# Patient Record
Sex: MALE | Race: WHITE | NOT HISPANIC OR LATINO | Employment: OTHER | URBAN - METROPOLITAN AREA
[De-identification: names, ages, dates, MRNs, and addresses within clinical notes are randomized per-mention and may not be internally consistent; named-entity substitution may affect disease eponyms.]

---

## 2017-03-24 ENCOUNTER — OFFICE VISIT (OUTPATIENT)
Dept: CARDIOLOGY | Facility: CLINIC | Age: 67
End: 2017-03-24

## 2017-03-24 VITALS
SYSTOLIC BLOOD PRESSURE: 120 MMHG | DIASTOLIC BLOOD PRESSURE: 64 MMHG | HEIGHT: 68 IN | HEART RATE: 63 BPM | WEIGHT: 160 LBS | BODY MASS INDEX: 24.25 KG/M2

## 2017-03-24 DIAGNOSIS — I48.0 PAROXYSMAL ATRIAL FIBRILLATION (HCC): Primary | ICD-10-CM

## 2017-03-24 PROCEDURE — 93000 ELECTROCARDIOGRAM COMPLETE: CPT | Performed by: NURSE PRACTITIONER

## 2017-03-24 PROCEDURE — 99213 OFFICE O/P EST LOW 20 MIN: CPT | Performed by: NURSE PRACTITIONER

## 2017-03-24 RX ORDER — SILDENAFIL 25 MG/1
25 TABLET, FILM COATED ORAL DAILY PRN
COMMUNITY
Start: 2022-09-08

## 2017-03-24 NOTE — PROGRESS NOTES
Date of Office Visit: 2017  Encounter Provider: KAITY Gonsalez  Place of Service: [facility] CARDIOLOGY  Patient Name: Hieu Mann  :1950    Chief Complaint   Patient presents with   • Atrial Fibrillation     Follow-up   :     HPI: Hieu Mann is a 66 y.o. male comes in today for follow-up for atrial fibrillation.  In 2015, the patient had atrial fibrillation in the setting of a severe infection.  During his hospitalization for infection, he had an echo card gram performed showing an EF of 45%.  This was repeated 6 months later showing an EF of 62% with mild MR and mild TR.  His aortic valve has moderate to severe thickening with mild AR.  No stenosis.    Today, he follows up and has been feeling well in the past year.  He's had no episodes of atrial fibrillation.  He denies palpitations or tachycardia, shortness of breath, edema, lightheadedness, chest pain, fatigue, orthopnea or PND.  He exercises on a regular basis and watches his nutrition.    Past Medical History:   Diagnosis Date   • Atrial fibrillation    • Hyperlipidemia    • [place] spotted fever        Past Surgical History:   Procedure Laterality Date   • REPLACEMENT TOTAL KNEE BILATERAL     • TOTAL HIP ARTHROPLASTY Bilateral            Review of Systems   Constitution: Negative for fever and malaise/fatigue.   HENT: Negative for ear pain, hearing loss, nosebleeds and sore throat.    Eyes: Negative for double vision, pain, vision loss in left eye, vision loss in right eye and visual disturbance.   Cardiovascular: Negative for claudication and leg swelling.   Respiratory: Negative for cough, snoring and wheezing.    Endocrine: Negative for cold intolerance, heat intolerance and polyuria.   Skin: Negative for color change, itching and rash.   Musculoskeletal: Negative for joint pain, joint swelling and muscle cramps.   Gastrointestinal: Negative for abdominal pain, diarrhea,  "melena, nausea and vomiting.   Genitourinary: Negative for bladder incontinence and hematuria.   Neurological: Negative for excessive daytime sleepiness, dizziness, light-headedness, paresthesias and seizures.   Psychiatric/Behavioral: Negative for depression. The patient is not nervous/anxious.    All other systems reviewed and are negative.    All other systems reviewed and are negative    No Known Allergies    All aspects of family and social history reviewed.          Objective:     Vitals:    03/24/17 0902   BP: 120/64   BP Location: Left arm   Pulse: 63   Weight: 160 lb (72.6 kg)   Height: 68\" (172.7 cm)     Body mass index is 24.33 kg/(m^2).    PHYSICAL EXAM:  Physical Exam   Constitutional: He is oriented to person, place, and time. He appears well-developed and well-nourished.   HENT:   Head: Normocephalic and atraumatic.   Neck: Neck supple. No JVD present.   Cardiovascular: Normal rate, regular rhythm and intact distal pulses.    Murmur heard.   Holosystolic murmur is present with a grade of 1/6  at the upper left sternal border  Pulses:       Carotid pulses are 2+ on the right side, and 2+ on the left side.       Radial pulses are 2+ on the right side, and 2+ on the left side.        Dorsalis pedis pulses are 2+ on the right side, and 2+ on the left side.   Pulmonary/Chest: Effort normal and breath sounds normal. No accessory muscle usage. No respiratory distress. He has no rales.   Abdominal: Soft. Normal appearance and bowel sounds are normal. There is no tenderness.   Musculoskeletal: Normal range of motion. He exhibits no edema.   Neurological: He is alert and oriented to person, place, and time.   Skin: Skin is warm, dry and intact. He is not diaphoretic.   Psychiatric: He has a normal mood and affect. His speech is normal and behavior is normal. Judgment and thought content normal. Cognition and memory are normal.         ECG 12 Lead  Date/Time: 3/24/2017 9:28 AM  Performed by: FARIHA " ISRA  Authorized by: ISRA FRIED   Comparison: compared with previous ECG from 2/29/2016  Rhythm: sinus rhythm  Rate: normal  BPM: 63  Conduction: conduction normal  T Waves: T waves normal  QRS axis: normal  Other: no other findings  Clinical impression: normal ECG  Comments: Indication atrial fibrillation                Assessment:       Diagnosis Plan   1. Paroxysmal atrial fibrillation          Orders Placed This Encounter   Procedures   • ECG 12 Lead     This order was created via procedure documentation       Current Outpatient Prescriptions   Medication Sig Dispense Refill   • aspirin 81 MG tablet Take 81 mg by mouth daily.     • atorvastatin (LIPITOR) 20 MG tablet Take 20 mg by mouth daily.     • calcium-vitamin D (OSCAL-500) 500-200 MG-UNIT per tablet Take 1 tablet by mouth daily.     • Multiple Vitamins-Minerals (CENTRUM SILVER ADULT 50+ PO) Take  by mouth.     • sildenafil (VIAGRA) 25 MG tablet Take 25 mg by mouth Daily As Needed for erectile dysfunction.       No current facility-administered medications for this visit.             Plan:       History of paroxysmal atrial fibrillation in the setting of hospitalization for [place] spotted fever.  He's had no return of his atrial fibrillation since his hospitalization.  He has not taking metoprolol.  He does have a history of hyperlipidemia and is managed by his PCP.  No changes at this time.  I recommend follow-up with Dr. Armenta in 1 year.        Follow up in office in one year.     As always, it has been a pleasure to participate in this patient's care.      Sincerely,      KAITY Gonsalez

## 2018-02-15 ENCOUNTER — HOSPITAL ENCOUNTER (EMERGENCY)
Facility: HOSPITAL | Age: 68
Discharge: HOME | End: 2018-02-16
Attending: EMERGENCY MEDICINE | Admitting: EMERGENCY MEDICINE

## 2018-02-15 DIAGNOSIS — K43.9 VENTRAL HERNIA WITHOUT OBSTRUCTION OR GANGRENE: Primary | ICD-10-CM

## 2018-02-15 LAB
ALBUMIN SERPL-MCNC: 4.4 G/DL (ref 3.5–5.2)
ALBUMIN/GLOB SERPL: 1.3 G/DL
ALP SERPL-CCNC: 105 U/L (ref 39–117)
ALT SERPL W P-5'-P-CCNC: 19 U/L (ref 1–41)
ANION GAP SERPL CALCULATED.3IONS-SCNC: 13.1 MMOL/L
AST SERPL-CCNC: 25 U/L (ref 1–40)
BACTERIA UR QL AUTO: ABNORMAL /HPF
BASOPHILS # BLD AUTO: 0.01 10*3/MM3 (ref 0–0.2)
BASOPHILS NFR BLD AUTO: 0.1 % (ref 0–1.5)
BILIRUB SERPL-MCNC: 0.4 MG/DL (ref 0.1–1.2)
BILIRUB UR QL STRIP: NEGATIVE
BUN BLD-MCNC: 13 MG/DL (ref 8–23)
BUN/CREAT SERPL: 16.9 (ref 7–25)
CALCIUM SPEC-SCNC: 9.5 MG/DL (ref 8.6–10.5)
CHLORIDE SERPL-SCNC: 95 MMOL/L (ref 98–107)
CLARITY UR: ABNORMAL
CO2 SERPL-SCNC: 25.9 MMOL/L (ref 22–29)
COLOR UR: YELLOW
CREAT BLD-MCNC: 0.77 MG/DL (ref 0.76–1.27)
DEPRECATED RDW RBC AUTO: 45.1 FL (ref 37–54)
EOSINOPHIL # BLD AUTO: 0.05 10*3/MM3 (ref 0–0.7)
EOSINOPHIL NFR BLD AUTO: 0.4 % (ref 0.3–6.2)
ERYTHROCYTE [DISTWIDTH] IN BLOOD BY AUTOMATED COUNT: 14.5 % (ref 11.5–14.5)
GFR SERPL CREATININE-BSD FRML MDRD: 100 ML/MIN/1.73
GLOBULIN UR ELPH-MCNC: 3.4 GM/DL
GLUCOSE BLD-MCNC: 141 MG/DL (ref 65–99)
GLUCOSE UR STRIP-MCNC: NEGATIVE MG/DL
HCT VFR BLD AUTO: 42.6 % (ref 40.4–52.2)
HGB BLD-MCNC: 14.1 G/DL (ref 13.7–17.6)
HGB UR QL STRIP.AUTO: NEGATIVE
HOLD SPECIMEN: NORMAL
HOLD SPECIMEN: NORMAL
HYALINE CASTS UR QL AUTO: ABNORMAL /LPF
IMM GRANULOCYTES # BLD: 0.02 10*3/MM3 (ref 0–0.03)
IMM GRANULOCYTES NFR BLD: 0.2 % (ref 0–0.5)
KETONES UR QL STRIP: NEGATIVE
LEUKOCYTE ESTERASE UR QL STRIP.AUTO: NEGATIVE
LIPASE SERPL-CCNC: 29 U/L (ref 13–60)
LYMPHOCYTES # BLD AUTO: 0.81 10*3/MM3 (ref 0.9–4.8)
LYMPHOCYTES NFR BLD AUTO: 7.1 % (ref 19.6–45.3)
MCH RBC QN AUTO: 28 PG (ref 27–32.7)
MCHC RBC AUTO-ENTMCNC: 33.1 G/DL (ref 32.6–36.4)
MCV RBC AUTO: 84.5 FL (ref 79.8–96.2)
MONOCYTES # BLD AUTO: 0.54 10*3/MM3 (ref 0.2–1.2)
MONOCYTES NFR BLD AUTO: 4.7 % (ref 5–12)
NEUTROPHILS # BLD AUTO: 10.02 10*3/MM3 (ref 1.9–8.1)
NEUTROPHILS NFR BLD AUTO: 87.5 % (ref 42.7–76)
NITRITE UR QL STRIP: NEGATIVE
PH UR STRIP.AUTO: 8 [PH] (ref 5–8)
PLATELET # BLD AUTO: 204 10*3/MM3 (ref 140–500)
PMV BLD AUTO: 11.2 FL (ref 6–12)
POTASSIUM BLD-SCNC: 4 MMOL/L (ref 3.5–5.2)
PROT SERPL-MCNC: 7.8 G/DL (ref 6–8.5)
PROT UR QL STRIP: ABNORMAL
RBC # BLD AUTO: 5.04 10*6/MM3 (ref 4.6–6)
RBC # UR: ABNORMAL /HPF
REF LAB TEST METHOD: ABNORMAL
SODIUM BLD-SCNC: 134 MMOL/L (ref 136–145)
SP GR UR STRIP: 1.02 (ref 1–1.03)
SQUAMOUS #/AREA URNS HPF: ABNORMAL /HPF
UROBILINOGEN UR QL STRIP: ABNORMAL
WBC NRBC COR # BLD: 11.45 10*3/MM3 (ref 4.5–10.7)
WBC UR QL AUTO: ABNORMAL /HPF
WHOLE BLOOD HOLD SPECIMEN: NORMAL
WHOLE BLOOD HOLD SPECIMEN: NORMAL

## 2018-02-15 PROCEDURE — 36415 COLL VENOUS BLD VENIPUNCTURE: CPT | Performed by: EMERGENCY MEDICINE

## 2018-02-15 PROCEDURE — 85025 COMPLETE CBC W/AUTO DIFF WBC: CPT | Performed by: EMERGENCY MEDICINE

## 2018-02-15 PROCEDURE — 81001 URINALYSIS AUTO W/SCOPE: CPT | Performed by: EMERGENCY MEDICINE

## 2018-02-15 PROCEDURE — 83690 ASSAY OF LIPASE: CPT | Performed by: EMERGENCY MEDICINE

## 2018-02-15 PROCEDURE — 80053 COMPREHEN METABOLIC PANEL: CPT | Performed by: EMERGENCY MEDICINE

## 2018-02-15 PROCEDURE — 99284 EMERGENCY DEPT VISIT MOD MDM: CPT

## 2018-02-15 RX ORDER — SODIUM CHLORIDE 0.9 % (FLUSH) 0.9 %
10 SYRINGE (ML) INJECTION AS NEEDED
Status: DISCONTINUED | OUTPATIENT
Start: 2018-02-15 | End: 2018-02-16 | Stop reason: HOSPADM

## 2018-02-16 VITALS
OXYGEN SATURATION: 95 % | BODY MASS INDEX: 25.01 KG/M2 | HEART RATE: 71 BPM | RESPIRATION RATE: 15 BRPM | SYSTOLIC BLOOD PRESSURE: 136 MMHG | DIASTOLIC BLOOD PRESSURE: 71 MMHG | WEIGHT: 165 LBS | HEIGHT: 68 IN | TEMPERATURE: 99.4 F

## 2018-02-16 NOTE — DISCHARGE INSTRUCTIONS
Return to the ED if you develop any intractable pain, vomiting or redness around your abdomen. Do not lift anything greater than 10-15 pounds.

## 2018-02-16 NOTE — ED NOTES
Pt resting comfortably on stretcher, reports that around 5pm tonight started having severe periumbilical pain rated 10/10 sudden onset, pain lasting approx 4 hours. Pain has now resolved. Pt's abd soft and nontender except around umbilicus, where patient states he has a hernia.     Kathryn Ponce RN  02/15/18 1460

## 2018-02-16 NOTE — ED PROVIDER NOTES
EMERGENCY DEPARTMENT ENCOUNTER    CHIEF COMPLAINT  Chief Complaint: abd pain  History given by: patient, son  History limited by: nothing  Room Number: 39/39  PMD: Clara Ann Pallares, MD    HPI:  Pt is a 67 y.o. male who presents complaining of intermittent, sharp, severe periumbilical abd pain that began around 1700 today. Pt states that the pain resolved after 4-5 hours. Pt states that he has a known umbilical hernia and that his hernia was bulging and hard to the touch while he was in pain. Pt states that belching and passing gas improved the pain but sitting down made his pain worse. Pt also complains of nausea while he had the pain but denies fever, chills, diarrhea, vomiting. Pt denies having similar symptoms previously.    Duration:  4-5 hours  Onset: gradual  Timing: constant  Location: periumbilical  Radiation: none  Quality: sharp  Intensity/Severity: severe  Progression: resolved  Associated Symptoms: nausea  Aggravating Factors: sitting down  Alleviating Factors: belching, passing gas  Previous Episodes: Pt denies having similar symptoms previously.  Treatment before arrival: none    PAST MEDICAL HISTORY  Active Ambulatory Problems     Diagnosis Date Noted   • No Active Ambulatory Problems     Resolved Ambulatory Problems     Diagnosis Date Noted   • Atrial fibrillation      Past Medical History:   Diagnosis Date   • Atrial fibrillation    • Hyperlipidemia    • [place] spotted fever 2015       PAST SURGICAL HISTORY  Past Surgical History:   Procedure Laterality Date   • INGUINAL HERNIA REPAIR     • REPLACEMENT TOTAL KNEE BILATERAL     • TOTAL HIP ARTHROPLASTY Bilateral        FAMILY HISTORY  History reviewed. No pertinent family history.    SOCIAL HISTORY  Social History     Social History   • Marital status:      Spouse name: N/A   • Number of children: N/A   • Years of education: N/A     Occupational History   • Not on file.     Social History Main Topics   • Smoking status: Never Smoker    • Smokeless tobacco: Not on file   • Alcohol use No   • Drug use: No   • Sexual activity: Not on file     Other Topics Concern   • Not on file     Social History Narrative   • No narrative on file       ALLERGIES  Review of patient's allergies indicates no known allergies.    REVIEW OF SYSTEMS  Review of Systems   Constitutional: Negative for activity change, appetite change and fever.   HENT: Negative for congestion and sore throat.    Eyes: Negative.    Respiratory: Negative for cough and shortness of breath.    Cardiovascular: Negative for chest pain and leg swelling.   Gastrointestinal: Positive for abdominal pain (periumbilical) and nausea. Negative for diarrhea and vomiting.   Endocrine: Negative.    Genitourinary: Negative for decreased urine volume and dysuria.   Musculoskeletal: Negative for neck pain.   Skin: Negative for rash and wound.   Allergic/Immunologic: Negative.    Neurological: Negative for weakness, numbness and headaches.   Hematological: Negative.    Psychiatric/Behavioral: Negative.    All other systems reviewed and are negative.      PHYSICAL EXAM  ED Triage Vitals   Temp Heart Rate Resp BP SpO2   02/15/18 2100 02/15/18 2100 02/15/18 2126 02/15/18 2126 02/15/18 2100   99.4 °F (37.4 °C) 80 15 127/81 97 %      Temp src Heart Rate Source Patient Position BP Location FiO2 (%)   02/15/18 2100 02/15/18 2100 -- -- --   Tympanic Monitor          Physical Exam   Constitutional: He is oriented to person, place, and time. He appears distressed (mild).   HENT:   Head: Normocephalic and atraumatic.   Eyes: EOM are normal. Pupils are equal, round, and reactive to light.   Neck: Normal range of motion. Neck supple.   Cardiovascular: Normal rate and regular rhythm.    Murmur heard.   Systolic (ejection murmur best heard at the left sternal border) murmur is present with a grade of 3/6   Pulmonary/Chest: Effort normal and breath sounds normal. No respiratory distress.   Abdominal: Soft. Bowel sounds are  normal. There is tenderness (superior to umbilicus). There is no rebound and no guarding. A hernia (ventral, reducible) is present.   Musculoskeletal: Normal range of motion. He exhibits no edema.   Neurological: He is alert and oriented to person, place, and time. He has normal sensation and normal strength.   Skin: Skin is warm and dry.   Psychiatric: Mood and affect normal.   Nursing note and vitals reviewed.      LAB RESULTS  Lab Results (last 24 hours)     Procedure Component Value Units Date/Time    CBC & Differential [[id number]] Collected:  02/15/18 2134    Specimen:  Blood Updated:  02/15/18 2143    Narrative:       The following orders were created for panel order CBC & Differential.  Procedure                               Abnormality         Status                     ---------                               -----------         ------                     CBC Auto Differential[[id number]]        Abnormal            Final result                 Please view results for these tests on the individual orders.    Comprehensive Metabolic Panel [[id number]]  (Abnormal) Collected:  02/15/18 2134    Specimen:  Blood Updated:  02/15/18 2204     Glucose 141 (H) mg/dL      BUN 13 mg/dL      Creatinine 0.77 mg/dL      Sodium 134 (L) mmol/L      Potassium 4.0 mmol/L      Chloride 95 (L) mmol/L      CO2 25.9 mmol/L      Calcium 9.5 mg/dL      Total Protein 7.8 g/dL      Albumin 4.40 g/dL      ALT (SGPT) 19 U/L      AST (SGOT) 25 U/L      Alkaline Phosphatase 105 U/L      Total Bilirubin 0.4 mg/dL      eGFR Non African Amer 100 mL/min/1.73      Globulin 3.4 gm/dL      A/G Ratio 1.3 g/dL      BUN/Creatinine Ratio 16.9     Anion Gap 13.1 mmol/L     Lipase [[id number]]  (Normal) Collected:  02/15/18 2134    Specimen:  Blood Updated:  02/15/18 2204     Lipase 29 U/L     CBC Auto Differential [[id number]]  (Abnormal) Collected:  02/15/18 2134    Specimen:  Blood Updated:  02/15/18 2143     WBC 11.45 (H) 10*3/mm3      RBC 5.04  10*6/mm3      Hemoglobin 14.1 g/dL      Hematocrit 42.6 %      MCV 84.5 fL      MCH 28.0 pg      MCHC 33.1 g/dL      RDW 14.5 %      RDW-SD 45.1 fl      MPV 11.2 fL      Platelets 204 10*3/mm3      Neutrophil % 87.5 (H) %      Lymphocyte % 7.1 (L) %      Monocyte % 4.7 (L) %      Eosinophil % 0.4 %      Basophil % 0.1 %      Immature Grans % 0.2 %      Neutrophils, Absolute 10.02 (H) 10*3/mm3      Lymphocytes, Absolute 0.81 (L) 10*3/mm3      Monocytes, Absolute 0.54 10*3/mm3      Eosinophils, Absolute 0.05 10*3/mm3      Basophils, Absolute 0.01 10*3/mm3      Immature Grans, Absolute 0.02 10*3/mm3     Urinalysis With / Culture If Indicated - Urine, Clean Catch [[id number]]  (Abnormal) Collected:  02/15/18 2135    Specimen:  Urine from Urine, Clean Catch Updated:  02/15/18 2151     Color, UA Yellow     Appearance, UA Cloudy (A)     pH, UA 8.0     Specific Gravity, UA 1.021     Glucose, UA Negative     Ketones, UA Negative     Bilirubin, UA Negative     Blood, UA Negative     Protein, UA 30 mg/dL (1+) (A)     Leuk Esterase, UA Negative     Nitrite, UA Negative     Urobilinogen, UA 1.0 E.U./dL    Urinalysis, Microscopic Only - Urine, Clean Catch [[id number]]  (Abnormal) Collected:  02/15/18 2135    Specimen:  Urine from Urine, Clean Catch Updated:  02/15/18 2151     RBC, UA 3-5 (A) /HPF      WBC, UA 0-2 /HPF      Bacteria, UA None Seen /HPF      Squamous Epithelial Cells, UA 0-2 /HPF      Hyaline Casts, UA 0-2 /LPF      Methodology Automated Microscopy          I ordered the above labs and reviewed the results    PROCEDURES  Procedures      PROGRESS AND CONSULTS  ED Course     2319- Notified pt and family of the pt's lab results, including his WBC count. D/w pt and family that the pt's pain is likely due to his known hernia. D/w pt and family that since his pain is resolved and his hernia is reducible, there is no indication for a CT Abd/Pelvis at this time. Discussed the plan to discharge the pt home with instructions to  follow up with general surgery. Pt and family request that I refer them to Dr. Finn (general surgery). Pt understands and agrees with the plan, all questions answered.    MEDICAL DECISION MAKING  Results were reviewed/discussed with the patient and they were also made aware of online access. Pt also made aware that some labs, such as cultures, will not be resulted during ER visit and follow up with PMD is necessary.     MDM  Number of Diagnoses or Management Options  Ventral hernia without obstruction or gangrene:      Amount and/or Complexity of Data Reviewed  Clinical lab tests: ordered and reviewed (WBC=11.45)  Obtain history from someone other than the patient: yes (son)    Patient Progress  Patient progress: stable         DIAGNOSIS  Final diagnoses:   Ventral hernia without obstruction or gangrene       DISPOSITION  DISCHARGE    Patient discharged in stable condition.    Reviewed implications of results, diagnosis, meds, responsibility to follow up, warning signs and symptoms of possible worsening, potential complications and reasons to return to ER, including fever, worsening pain or any concerns.    Patient/Family voiced understanding of above instructions.    Discussed plan for discharge, as there is no emergent indication for admission.  Pt/family is agreeable and understands need for follow up and repeat testing.  Pt is aware that discharge does not mean that nothing is wrong but it indicates no emergency is present that requires admission and they must continue care with follow-up as given below or physician of their choice.     FOLLOW-UP  David Finn MD  [facility] [address] [address] [phone]    Schedule an appointment as soon as possible for a visit           Medication List      Notice     No changes were made to your prescriptions during this visit.            Latest Documented Vital Signs:  As of 2:38 AM  BP- 136/71 HR- 71 Temp- 99.4 °F (37.4 °C) (Tympanic) O2 sat-  95%    --  Documentation assistance provided by dang Batista for Dr. Wilkes.  Information recorded by the dang was done at my direction and has been verified and validated by me.     Kathy Batista  02/15/18 0499       Dariel Wilkes MD  02/16/18 2672

## 2018-03-01 ENCOUNTER — OFFICE VISIT (OUTPATIENT)
Dept: SURGERY | Facility: CLINIC | Age: 68
End: 2018-03-01

## 2018-03-01 VITALS — HEIGHT: 68 IN | HEART RATE: 78 BPM | BODY MASS INDEX: 25.46 KG/M2 | OXYGEN SATURATION: 99 % | WEIGHT: 168 LBS

## 2018-03-01 DIAGNOSIS — K43.9 HERNIA, EPIGASTRIC: Primary | ICD-10-CM

## 2018-03-01 PROCEDURE — 99203 OFFICE O/P NEW LOW 30 MIN: CPT | Performed by: SURGERY

## 2018-03-01 RX ORDER — CEFAZOLIN SODIUM 2 G/100ML
2 INJECTION, SOLUTION INTRAVENOUS ONCE
Status: CANCELLED | OUTPATIENT
Start: 2018-03-09 | End: 2018-03-09

## 2018-03-01 NOTE — PROGRESS NOTES
SUMMARY (A/P):    67-year-old gentleman with symptomatic epigastric hernia, wishes to proceed with repair.  I described to him the nature of the procedure, the rationale for the procedure, and the risks including but not limited to bleeding, infection, use of mesh, and recurrence.      CC:  Hernia    HPI:  67-year-old gentleman who has had what he believes to be a hernia in the supraumbilical region for approximately 2 years.  He had an episode of severe pain associated with increased size of the bulge approximately 2 weeks ago leading to an emergency room visit.    PHYSICAL EXAM:   Constitutional: Well-developed well-nourished, no acute distress  Vital signs: Heart rate 78, weight 160 pounds, height 68 inches, BMI 25.6  Eyes: Conjunctiva normal, sclera nonicteric  ENMT: Hearing grossly normal, oral mucosa moist  Neck: Supple, no palpable mass, normal thyroid, trachea midline  Respiratory: Clear to auscultation, normal inspiratory effort  Cardiovascular: Regular rate, no murmur, no carotid bruit, no peripheral edema, no jugular venous distention  Gastrointestinal: Soft, nontender, no palpable mass, no hepatosplenomegaly, 3 cm incarcerated epigastric hernia immediately superior to the umbilicus, minimally tender, bowel sounds normal  Lymphatics (palpable nodes):  cervical-negative, axillary-negative  Skin:  Warm, dry, no rash on visualized skin surfaces  Musculoskeletal: Symmetric strength, normal gait  Psychiatric: Alert and oriented ×3, normal affect     ALLERGIES: reviewed, in Epic    MEDICATIONS: reviewed, in Epic    PMH:    Atrial fibrillation (resolved, sees Dr. Montenegro up periodically)  Hyperlipidemia  [place] spotted fever 2015    PSH:    Right total hip arthroplasty 2009  Left total knee arthroplasty 2012  Left total hip arthroplasty 2011  Right total knee arthroplasty 2003    FAMILY HISTORY:    Negative for colorectal cancer    SOCIAL HISTORY:   Denies tobacco use  Denies alcohol use    ROS:  No  chest pain or shortness of air.  Negative for constipation or dysuria.  All other systems reviewed and negative other than presenting complaints.    ANA SMITH M.D.

## 2018-03-06 ENCOUNTER — APPOINTMENT (OUTPATIENT)
Dept: PREADMISSION TESTING | Facility: HOSPITAL | Age: 68
End: 2018-03-06

## 2018-03-06 VITALS
HEART RATE: 70 BPM | RESPIRATION RATE: 16 BRPM | DIASTOLIC BLOOD PRESSURE: 63 MMHG | WEIGHT: 173.44 LBS | TEMPERATURE: 98.3 F | HEIGHT: 68 IN | SYSTOLIC BLOOD PRESSURE: 117 MMHG | OXYGEN SATURATION: 98 % | BODY MASS INDEX: 26.29 KG/M2

## 2018-03-06 LAB
ANION GAP SERPL CALCULATED.3IONS-SCNC: 11.6 MMOL/L
BUN BLD-MCNC: 18 MG/DL (ref 8–23)
BUN/CREAT SERPL: 26.9 (ref 7–25)
CALCIUM SPEC-SCNC: 9.1 MG/DL (ref 8.6–10.5)
CHLORIDE SERPL-SCNC: 105 MMOL/L (ref 98–107)
CO2 SERPL-SCNC: 23.4 MMOL/L (ref 22–29)
CREAT BLD-MCNC: 0.67 MG/DL (ref 0.76–1.27)
DEPRECATED RDW RBC AUTO: 46.1 FL (ref 37–54)
ERYTHROCYTE [DISTWIDTH] IN BLOOD BY AUTOMATED COUNT: 14.6 % (ref 11.5–14.5)
GFR SERPL CREATININE-BSD FRML MDRD: 118 ML/MIN/1.73
GLUCOSE BLD-MCNC: 159 MG/DL (ref 65–99)
HCT VFR BLD AUTO: 41.6 % (ref 40.4–52.2)
HGB BLD-MCNC: 13 G/DL (ref 13.7–17.6)
MCH RBC QN AUTO: 27 PG (ref 27–32.7)
MCHC RBC AUTO-ENTMCNC: 31.3 G/DL (ref 32.6–36.4)
MCV RBC AUTO: 86.5 FL (ref 79.8–96.2)
PLATELET # BLD AUTO: 215 10*3/MM3 (ref 140–500)
PMV BLD AUTO: 10.6 FL (ref 6–12)
POTASSIUM BLD-SCNC: 4.1 MMOL/L (ref 3.5–5.2)
RBC # BLD AUTO: 4.81 10*6/MM3 (ref 4.6–6)
SODIUM BLD-SCNC: 140 MMOL/L (ref 136–145)
WBC NRBC COR # BLD: 5.85 10*3/MM3 (ref 4.5–10.7)

## 2018-03-06 PROCEDURE — 85027 COMPLETE CBC AUTOMATED: CPT | Performed by: SURGERY

## 2018-03-06 PROCEDURE — 80048 BASIC METABOLIC PNL TOTAL CA: CPT | Performed by: SURGERY

## 2018-03-06 PROCEDURE — 93005 ELECTROCARDIOGRAM TRACING: CPT

## 2018-03-06 PROCEDURE — 36415 COLL VENOUS BLD VENIPUNCTURE: CPT

## 2018-03-06 PROCEDURE — 93010 ELECTROCARDIOGRAM REPORT: CPT | Performed by: INTERNAL MEDICINE

## 2018-03-06 NOTE — DISCHARGE INSTRUCTIONS
Take the following medications the morning of surgery with a small sip of water:    NONE    General Instructions:  • Do not eat solid food after midnight the night before surgery.  • You may drink clear liquids day of surgery but must stop at least one hour before your hospital arrival time.  ( 0700 AM )  • It is beneficial for you to have a clear drink that contains carbohydrates the day of surgery.  We suggest a 12 to 20 ounce bottle of Gatorade or Powerade for non-diabetic patients     Clear liquids are liquids you can see through.  Nothing red in color.     Plain water                               Sports drinks  Sodas                                   Gelatin (Jell-O)  Fruit juices without pulp such as white grape juice and apple juice  Popsicles that contain no fruit or yogurt  Tea or coffee (no cream or milk added)  Gatorade / Powerade  G2 / Powerade Zero     • Bring any papers given to you in the doctor’s office.  • Wear clean comfortable clothes and socks.  • Do not wear contact lenses or make-up.  Bring a case for your glasses.   • Remove all piercings.  Leave jewelry and any other valuables at home.  • The Pre-Admission Testing nurse will instruct you to bring medications if unable to obtain an accurate list in Pre-Admission Testing.  • REPORT TO OUTPATIENT SURGERY ON 3-9-2018 AT 0800 AM            Preventing a Surgical Site Infection:  • For 2 to 3 days before surgery, avoid shaving with a razor because the razor can irritate skin and make it easier to develop an infection.  • The night prior to surgery sleep in a clean bed with clean clothing.  Do not allow pets to sleep with you.  • Shower on the morning of surgery using a fresh bar of anti-bacterial soap (such as Dial) and clean washcloth.  Dry with a clean towel and dress in clean clothing.  • Ask your surgeon if you will be receiving antibiotics prior to surgery.  • Make sure you, your family, and all healthcare providers clean their hands with soap  and water or an alcohol based hand  before caring for you or your wound.    Day of surgery:  Upon arrival, a Pre-op nurse and Anesthesiologist will review your health history, obtain vital signs, and answer questions you may have.  The only belongings needed at this time will be your home medications and if applicable your C-PAP/BI-PAP machine.  If you are staying overnight your family can leave the rest of your belongings in the car and bring them to your room later.  A Pre-op nurse will start an IV and you may receive medication in preparation for surgery, including something to help you relax.  Your family will be able to see you in the Pre-op area.  While you are in surgery your family should notify the waiting room  if they leave the waiting room area and provide a contact phone number.    Please be aware that surgery does come with discomfort.  We want to make every effort to control your discomfort so please discuss any uncontrolled symptoms with your nurse.   Your doctor will most likely have prescribed pain medications.      If you are going home after surgery you will receive individualized written care instructions before being discharged.  A responsible adult must drive you to and from the hospital on the day of your surgery and stay with you for 24 hours.    If you have any questions please call Pre-Admission Testing at [phone].  Deductibles and co-payments are collected on the day of service. Please be prepared to pay the required co-pay, deductible or deposit on the day of service as defined by your plan.

## 2018-03-09 ENCOUNTER — HOSPITAL ENCOUNTER (OUTPATIENT)
Facility: HOSPITAL | Age: 68
Setting detail: HOSPITAL OUTPATIENT SURGERY
Discharge: HOME | End: 2018-03-09
Attending: SURGERY | Admitting: SURGERY

## 2018-03-09 ENCOUNTER — ANESTHESIA (OUTPATIENT)
Dept: PERIOP | Facility: HOSPITAL | Age: 68
End: 2018-03-09

## 2018-03-09 ENCOUNTER — ANESTHESIA EVENT (OUTPATIENT)
Dept: PERIOP | Facility: HOSPITAL | Age: 68
End: 2018-03-09

## 2018-03-09 VITALS
RESPIRATION RATE: 18 BRPM | HEART RATE: 60 BPM | SYSTOLIC BLOOD PRESSURE: 110 MMHG | OXYGEN SATURATION: 100 % | DIASTOLIC BLOOD PRESSURE: 65 MMHG | TEMPERATURE: 97.7 F

## 2018-03-09 DIAGNOSIS — K43.9 HERNIA, EPIGASTRIC: ICD-10-CM

## 2018-03-09 PROCEDURE — 25010000002 MIDAZOLAM PER 1 MG: Performed by: NURSE ANESTHETIST, CERTIFIED REGISTERED

## 2018-03-09 PROCEDURE — 88302 TISSUE EXAM BY PATHOLOGIST: CPT | Performed by: SURGERY

## 2018-03-09 PROCEDURE — 49572 PR REPAIR EPIGASTRIC HERNIA,STRANG: CPT | Performed by: PHYSICIAN ASSISTANT

## 2018-03-09 PROCEDURE — 25010000002 ONDANSETRON PER 1 MG: Performed by: NURSE ANESTHETIST, CERTIFIED REGISTERED

## 2018-03-09 PROCEDURE — 25010000002 MIDAZOLAM PER 1 MG: Performed by: ANESTHESIOLOGY

## 2018-03-09 PROCEDURE — 25010000002 FENTANYL CITRATE (PF) 100 MCG/2ML SOLUTION: Performed by: NURSE ANESTHETIST, CERTIFIED REGISTERED

## 2018-03-09 PROCEDURE — 49572 PR REPAIR EPIGASTRIC HERNIA,STRANG: CPT | Performed by: SURGERY

## 2018-03-09 PROCEDURE — 25010000003 CEFAZOLIN IN DEXTROSE 2-4 GM/100ML-% SOLUTION: Performed by: SURGERY

## 2018-03-09 PROCEDURE — 25010000002 PROPOFOL 10 MG/ML EMULSION: Performed by: NURSE ANESTHETIST, CERTIFIED REGISTERED

## 2018-03-09 RX ORDER — MIDAZOLAM HYDROCHLORIDE 1 MG/ML
2 INJECTION INTRAMUSCULAR; INTRAVENOUS
Status: DISCONTINUED | OUTPATIENT
Start: 2018-03-09 | End: 2018-03-09 | Stop reason: HOSPADM

## 2018-03-09 RX ORDER — ONDANSETRON 2 MG/ML
4 INJECTION INTRAMUSCULAR; INTRAVENOUS ONCE AS NEEDED
Status: DISCONTINUED | OUTPATIENT
Start: 2018-03-09 | End: 2018-03-09 | Stop reason: HOSPADM

## 2018-03-09 RX ORDER — EPHEDRINE SULFATE 50 MG/ML
5 INJECTION, SOLUTION INTRAVENOUS ONCE AS NEEDED
Status: DISCONTINUED | OUTPATIENT
Start: 2018-03-09 | End: 2018-03-09 | Stop reason: HOSPADM

## 2018-03-09 RX ORDER — SODIUM CHLORIDE, SODIUM LACTATE, POTASSIUM CHLORIDE, CALCIUM CHLORIDE 600; 310; 30; 20 MG/100ML; MG/100ML; MG/100ML; MG/100ML
9 INJECTION, SOLUTION INTRAVENOUS CONTINUOUS
Status: DISCONTINUED | OUTPATIENT
Start: 2018-03-09 | End: 2018-03-09 | Stop reason: HOSPADM

## 2018-03-09 RX ORDER — PROMETHAZINE HYDROCHLORIDE 25 MG/1
12.5 TABLET ORAL ONCE AS NEEDED
Status: DISCONTINUED | OUTPATIENT
Start: 2018-03-09 | End: 2018-03-09 | Stop reason: HOSPADM

## 2018-03-09 RX ORDER — CEFAZOLIN SODIUM 2 G/100ML
2 INJECTION, SOLUTION INTRAVENOUS ONCE
Status: COMPLETED | OUTPATIENT
Start: 2018-03-09 | End: 2018-03-09

## 2018-03-09 RX ORDER — FENTANYL CITRATE 50 UG/ML
50 INJECTION, SOLUTION INTRAMUSCULAR; INTRAVENOUS
Status: DISCONTINUED | OUTPATIENT
Start: 2018-03-09 | End: 2018-03-09 | Stop reason: HOSPADM

## 2018-03-09 RX ORDER — SODIUM CHLORIDE 0.9 % (FLUSH) 0.9 %
1-10 SYRINGE (ML) INJECTION AS NEEDED
Status: DISCONTINUED | OUTPATIENT
Start: 2018-03-09 | End: 2018-03-09 | Stop reason: HOSPADM

## 2018-03-09 RX ORDER — LIDOCAINE HYDROCHLORIDE 10 MG/ML
0.5 INJECTION, SOLUTION EPIDURAL; INFILTRATION; INTRACAUDAL; PERINEURAL ONCE AS NEEDED
Status: DISCONTINUED | OUTPATIENT
Start: 2018-03-09 | End: 2018-03-09 | Stop reason: HOSPADM

## 2018-03-09 RX ORDER — MIDAZOLAM HYDROCHLORIDE 1 MG/ML
INJECTION INTRAMUSCULAR; INTRAVENOUS AS NEEDED
Status: DISCONTINUED | OUTPATIENT
Start: 2018-03-09 | End: 2018-03-09 | Stop reason: SURG

## 2018-03-09 RX ORDER — ONDANSETRON 2 MG/ML
INJECTION INTRAMUSCULAR; INTRAVENOUS AS NEEDED
Status: DISCONTINUED | OUTPATIENT
Start: 2018-03-09 | End: 2018-03-09 | Stop reason: SURG

## 2018-03-09 RX ORDER — ONDANSETRON 4 MG/1
4 TABLET, FILM COATED ORAL EVERY 6 HOURS PRN
Qty: 10 TABLET | Refills: 1 | Status: SHIPPED | OUTPATIENT
Start: 2018-03-09 | End: 2018-03-15

## 2018-03-09 RX ORDER — NALOXONE HCL 0.4 MG/ML
0.2 VIAL (ML) INJECTION AS NEEDED
Status: DISCONTINUED | OUTPATIENT
Start: 2018-03-09 | End: 2018-03-09 | Stop reason: HOSPADM

## 2018-03-09 RX ORDER — MAGNESIUM HYDROXIDE 1200 MG/15ML
LIQUID ORAL AS NEEDED
Status: DISCONTINUED | OUTPATIENT
Start: 2018-03-09 | End: 2018-03-09 | Stop reason: HOSPADM

## 2018-03-09 RX ORDER — PROMETHAZINE HYDROCHLORIDE 25 MG/1
25 SUPPOSITORY RECTAL ONCE AS NEEDED
Status: DISCONTINUED | OUTPATIENT
Start: 2018-03-09 | End: 2018-03-09 | Stop reason: HOSPADM

## 2018-03-09 RX ORDER — FAMOTIDINE 10 MG/ML
20 INJECTION, SOLUTION INTRAVENOUS ONCE
Status: COMPLETED | OUTPATIENT
Start: 2018-03-09 | End: 2018-03-09

## 2018-03-09 RX ORDER — BUPIVACAINE HYDROCHLORIDE AND EPINEPHRINE 5; 5 MG/ML; UG/ML
INJECTION, SOLUTION PERINEURAL AS NEEDED
Status: DISCONTINUED | OUTPATIENT
Start: 2018-03-09 | End: 2018-03-09 | Stop reason: HOSPADM

## 2018-03-09 RX ORDER — PROMETHAZINE HYDROCHLORIDE 25 MG/ML
12.5 INJECTION, SOLUTION INTRAMUSCULAR; INTRAVENOUS ONCE AS NEEDED
Status: DISCONTINUED | OUTPATIENT
Start: 2018-03-09 | End: 2018-03-09 | Stop reason: HOSPADM

## 2018-03-09 RX ORDER — FENTANYL CITRATE 50 UG/ML
INJECTION, SOLUTION INTRAMUSCULAR; INTRAVENOUS AS NEEDED
Status: DISCONTINUED | OUTPATIENT
Start: 2018-03-09 | End: 2018-03-09 | Stop reason: SURG

## 2018-03-09 RX ORDER — PROPOFOL 10 MG/ML
VIAL (ML) INTRAVENOUS CONTINUOUS PRN
Status: DISCONTINUED | OUTPATIENT
Start: 2018-03-09 | End: 2018-03-09 | Stop reason: SURG

## 2018-03-09 RX ORDER — FLUMAZENIL 0.1 MG/ML
0.2 INJECTION INTRAVENOUS AS NEEDED
Status: DISCONTINUED | OUTPATIENT
Start: 2018-03-09 | End: 2018-03-09 | Stop reason: HOSPADM

## 2018-03-09 RX ORDER — LABETALOL HYDROCHLORIDE 5 MG/ML
5 INJECTION, SOLUTION INTRAVENOUS
Status: DISCONTINUED | OUTPATIENT
Start: 2018-03-09 | End: 2018-03-09 | Stop reason: HOSPADM

## 2018-03-09 RX ORDER — HYDROCODONE BITARTRATE AND ACETAMINOPHEN 5; 325 MG/1; MG/1
TABLET ORAL
Qty: 30 TABLET | Refills: 0 | Status: SHIPPED | OUTPATIENT
Start: 2018-03-09 | End: 2018-03-15

## 2018-03-09 RX ORDER — HYDROCODONE BITARTRATE AND ACETAMINOPHEN 7.5; 325 MG/1; MG/1
1 TABLET ORAL ONCE AS NEEDED
Status: DISCONTINUED | OUTPATIENT
Start: 2018-03-09 | End: 2018-03-09 | Stop reason: HOSPADM

## 2018-03-09 RX ORDER — MIDAZOLAM HYDROCHLORIDE 1 MG/ML
1 INJECTION INTRAMUSCULAR; INTRAVENOUS
Status: DISCONTINUED | OUTPATIENT
Start: 2018-03-09 | End: 2018-03-09 | Stop reason: HOSPADM

## 2018-03-09 RX ORDER — HYDRALAZINE HYDROCHLORIDE 20 MG/ML
5 INJECTION INTRAMUSCULAR; INTRAVENOUS
Status: DISCONTINUED | OUTPATIENT
Start: 2018-03-09 | End: 2018-03-09 | Stop reason: HOSPADM

## 2018-03-09 RX ORDER — PROMETHAZINE HYDROCHLORIDE 25 MG/1
25 TABLET ORAL ONCE AS NEEDED
Status: DISCONTINUED | OUTPATIENT
Start: 2018-03-09 | End: 2018-03-09 | Stop reason: HOSPADM

## 2018-03-09 RX ORDER — OXYCODONE AND ACETAMINOPHEN 7.5; 325 MG/1; MG/1
1 TABLET ORAL ONCE AS NEEDED
Status: DISCONTINUED | OUTPATIENT
Start: 2018-03-09 | End: 2018-03-09 | Stop reason: HOSPADM

## 2018-03-09 RX ORDER — DIPHENHYDRAMINE HYDROCHLORIDE 50 MG/ML
12.5 INJECTION INTRAMUSCULAR; INTRAVENOUS
Status: DISCONTINUED | OUTPATIENT
Start: 2018-03-09 | End: 2018-03-09 | Stop reason: HOSPADM

## 2018-03-09 RX ADMIN — SODIUM CHLORIDE, POTASSIUM CHLORIDE, SODIUM LACTATE AND CALCIUM CHLORIDE 9 ML/HR: 600; 310; 30; 20 INJECTION, SOLUTION INTRAVENOUS at 08:26

## 2018-03-09 RX ADMIN — FENTANYL CITRATE 100 MCG: 50 INJECTION INTRAMUSCULAR; INTRAVENOUS at 10:30

## 2018-03-09 RX ADMIN — PROPOFOL 100 MCG/KG/MIN: 10 INJECTION, EMULSION INTRAVENOUS at 10:30

## 2018-03-09 RX ADMIN — MIDAZOLAM 1 MG: 1 INJECTION INTRAMUSCULAR; INTRAVENOUS at 08:35

## 2018-03-09 RX ADMIN — CEFAZOLIN SODIUM 2 G: 2 INJECTION, SOLUTION INTRAVENOUS at 10:23

## 2018-03-09 RX ADMIN — MIDAZOLAM 1 MG: 1 INJECTION INTRAMUSCULAR; INTRAVENOUS at 10:31

## 2018-03-09 RX ADMIN — FAMOTIDINE 20 MG: 10 INJECTION INTRAVENOUS at 08:33

## 2018-03-09 RX ADMIN — MIDAZOLAM 1 MG: 1 INJECTION INTRAMUSCULAR; INTRAVENOUS at 10:27

## 2018-03-09 RX ADMIN — ONDANSETRON 4 MG: 2 INJECTION INTRAMUSCULAR; INTRAVENOUS at 10:34

## 2018-03-09 NOTE — ANESTHESIA POSTPROCEDURE EVALUATION
Patient: Hieu Mann    Procedure Summary     Date Anesthesia Start Anesthesia Stop Room / Location    03/09/18 1023 1053  [facility]  /  [facility]       Procedure Diagnosis Surgeon Provider    EPIGASTRIC HERNIA REPAIR (N/A Abdomen) Hernia, epigastric  (Hernia, epigastric [K43.9]) MD Kwame Casey MD          Anesthesia Type: MAC  Last vitals  BP   98/57 (03/09/18 1055)   Temp   36.5 °C (97.7 °F) (03/09/18 1050)   Pulse   58 (03/09/18 1055)   Resp   16 (03/09/18 1055)     SpO2   96 % (03/09/18 1055)     Post Anesthesia Care and Evaluation    Patient location during evaluation: bedside  Patient participation: complete - patient participated  Level of consciousness: awake  Pain score: 2  Pain management: adequate  Airway patency: patent  Anesthetic complications: No anesthetic complications  PONV Status: none  Cardiovascular status: acceptable  Respiratory status: acceptable  Hydration status: acceptable    Comments: BP 98/57  Pulse 58  Temp 36.5 °C (97.7 °F) (Oral)   Resp 16  SpO2 96%

## 2018-03-09 NOTE — ANESTHESIA PREPROCEDURE EVALUATION
Anesthesia Evaluation     Patient summary reviewed and Nursing notes reviewed   no history of anesthetic complications:  NPO Solid Status: > 8 hours  NPO Liquid Status: > 2 hours           Airway   Mallampati: II  TM distance: >3 FB  Neck ROM: full  Dental - normal exam     Pulmonary - negative pulmonary ROS and normal exam   Cardiovascular - normal exam    (+) valvular problems/murmurs, dysrhythmias Atrial Fib, hyperlipidemia,       Neuro/Psych- negative ROS  GI/Hepatic/Renal/Endo - negative ROS     Musculoskeletal     Abdominal    Substance History      OB/GYN          Other   (+) arthritis                     Anesthesia Plan    ASA 3     MAC     Anesthetic plan and risks discussed with patient.

## 2018-03-09 NOTE — DISCHARGE INSTRUCTIONS
Dr. David Finn  [address] [facility] [address]  [place] [id number] [phone]    Discharge Instructions for Hernia Surgery      1. Go home, rest and take it easy today; however, you should get up and move about several times today to reduce the risk of developing a clot in your legs.      2. You may experience some dizziness or memory loss from the anesthesia.  This may last for the next 24 hours.  Someone should plan on staying with you for the first 24 hours for your safety.    3. Do not make any important legal decisions or sign any legal papers for the next 24 hours.      4. Eat and drink lightly today.  Start off with liquids, jello, soup, crackers or other bland foods at first. You may advance your diet tomorrow as tolerated as long as you do not experience any nausea or vomiting.     5. If skin glue (Dermabond) was used, your incisions are protected and covered.  The invisible glue will dissolve on its own as your incision heals. If dressings were used, you may remove your outer dressings in 3 days.  The white tapes called steri-strips should stay in place.  They will fall off on their own in 1-2 weeks.  Do not worry if they come off sooner.      6. If dressings were used, you may notice some bleeding/drainage on your outer dressings. A little bloody drainage is normal. If the bleeding/drainage is such that the bandage cannot absorb it, remove the dressing, apply clean gauze and apply firm pressure for a full 15 minutes.  If the bleeding continues, please call me.    7. You may shower tomorrow allowing water to run over the incisions; however, do not scrub the incisions.  No tub baths until your incisions are completely healed.      8. No lifting > 20 lbs. until you are seen at your follow-up visit.         9. You have received a prescription for a narcotic pain medicine, as you will have some pain following surgery.   You will not be totally pain free, but your pain medicine should make the pain  tolerable.  Please take your pain medicine as prescribed and always take your pills with food to prevent nausea. If you are having severe pain that cannot be controlled by the pain medicine, please contact me.      10. You have also received a prescription for an anti-nausea medicine.  Please take this as prescribed for any nausea or vomiting.  Nausea could be a result of the anesthesia or a result of the narcotic pain medicine.  If you experience severe nausea and vomiting that cannot be controlled by the nausea medicine, please call me.      11. If you had a laparoscopic surgery, it is not unusual to experience pain/discomfort in your shoulders or under your ribs after surgery.  It is from the gas used during the laparoscopic procedure and usually lasts 1-3 days.  The prescription pain medicine is used to treat the surgical pain and does not typically alleviate this “gassy” pain.     12. No driving for 24 hours and for as long as you are taking your prescription pain medicine.              13. You will need to call the office at [phone] to schedule a follow-up appointment in 6-10 days.     14. Remember to contact me for any of the following:    • Fever > 101 degrees  • Severe pain that cannot be controlled by taking your pain pills  • Severe nausea or vomiting that cannot be controlled by taking your nausea pills  • Significant bleeding of your incisions  • Drainage that has a bad smell or is yellow or green in appearance  • Any other questions or concerns        Additional Instruction for Inguinal Hernia Patients Only    1. If you did not urinate at the hospital after your surgery or if you feel the need to urinate and cannot, this will necessitate a return to the Emergency Room for placement of a urinary catheter.  You should also notify me as well.  As a rule, you should be able to empty your bladder within 4-6 hours after discharge from the hospital.      2. You may notice some scrotal bruising and/or  swelling. A scrotal support or briefs as well as ice packs may be used to alleviate discomfort.

## 2018-03-09 NOTE — OP NOTE
PREOPERATIVE DIAGNOSIS:  Epigastric hernia    POSTOPERATIVE DIAGNOSIS (FINDINGS):  Incarcerated epigastric hernia    PROCEDURE:  Open incarcerated epigastric hernia repair    SURGEON:  David Finn MD    ASSISTANT:  Brett Reynolds    ANESTHESIA:  Monitored sedation    EBL:  Minimal    SPECIMEN(S):  Incarcerated hernia contents    DESCRIPTION:  In supine position under sedation prepped and draped usual sterile manner.  Vertical incision made above the umbilicus and carried down to a relatively large incarcerated epigastric hernia sac.  The hernia consisted of herniated preperitoneal fat which could not be reduced.  I amputated at the level of the fascia with clamp and 0 Ethibond suture ligature.  The defect measured 1.5 cm and was closed with interrupted 0 Ethibond sutures.  Good hemostasis was noted and skin was closed with 3-0 Vicryl deep dermal, 5-0 Vicryl subcuticular, and Dermabond.    David Finn M.D.

## 2018-03-10 LAB
CYTO UR: NORMAL
LAB AP CASE REPORT: NORMAL
Lab: NORMAL
PATH REPORT.FINAL DX SPEC: NORMAL
PATH REPORT.GROSS SPEC: NORMAL

## 2018-03-15 ENCOUNTER — OFFICE VISIT (OUTPATIENT)
Dept: SURGERY | Facility: CLINIC | Age: 68
End: 2018-03-15

## 2018-03-15 DIAGNOSIS — Z09 FOLLOW UP: Primary | ICD-10-CM

## 2018-03-15 PROCEDURE — 99024 POSTOP FOLLOW-UP VISIT: CPT | Performed by: SURGERY

## 2018-03-23 ENCOUNTER — OFFICE VISIT (OUTPATIENT)
Dept: CARDIOLOGY | Facility: CLINIC | Age: 68
End: 2018-03-23

## 2018-03-23 VITALS
SYSTOLIC BLOOD PRESSURE: 130 MMHG | HEIGHT: 68 IN | WEIGHT: 167 LBS | DIASTOLIC BLOOD PRESSURE: 78 MMHG | HEART RATE: 68 BPM | BODY MASS INDEX: 25.31 KG/M2

## 2018-03-23 DIAGNOSIS — Z86.79 HISTORY OF ATRIAL FIBRILLATION: Primary | ICD-10-CM

## 2018-03-23 DIAGNOSIS — I35.8 AORTIC VALVE SCLEROSIS: ICD-10-CM

## 2018-03-23 PROCEDURE — 99213 OFFICE O/P EST LOW 20 MIN: CPT | Performed by: INTERNAL MEDICINE

## 2018-03-23 PROCEDURE — 93000 ELECTROCARDIOGRAM COMPLETE: CPT | Performed by: INTERNAL MEDICINE

## 2018-03-23 NOTE — PROGRESS NOTES
Date of Office Visit: 2018  Encounter Provider: Petr Armenta MD  Place of Service: [facility] CARDIOLOG[facility]  Patient Name: Hieu Mann  : 1950    Subjective:     Encounter Date:2018      Patient ID: Hieu Mann is a 67 y.o. male who has a cc of AF once during an infection w [place] Spotted Fever. He was sick as heck.   No AF since.     The patient had a good year.   No anginal chest pain,   No sig ivan,   No soa,   No fainting,  No orthostasis.   No edema.   Exercise tolerance: walk, works part-time delivering flowering.     There have been no hospital admission since the last visit.     There have been no bleeding events.       Past Medical History:   Diagnosis Date   • Arthritis    • Atrial fibrillation     resolved, periodic follup with Dr. Armenta   • Epigastric hernia    • Heart murmur     NO TREATMENT   • Hyperlipidemia    • [place] spotted fever        Social History     Social History   • Marital status:      Spouse name: N/A   • Number of children: N/A   • Years of education: N/A     Occupational History   • Not on file.     Social History Main Topics   • Smoking status: Never Smoker   • Smokeless tobacco: Never Used   • Alcohol use No   • Drug use: No   • Sexual activity: Defer     Other Topics Concern   • Not on file     Social History Narrative   • No narrative on file       Review of Systems   Constitution: Negative for fever and night sweats.   HENT: Negative for ear pain and stridor.    Eyes: Negative for discharge and visual halos.   Cardiovascular: Negative for cyanosis.   Respiratory: Negative for hemoptysis and sputum production.    Hematologic/Lymphatic: Negative for adenopathy.   Skin: Negative for nail changes and unusual hair distribution.   Musculoskeletal: Negative for gout and joint swelling.   Gastrointestinal: Negative for bowel incontinence and flatus.   Genitourinary: Negative for dysuria and flank pain.  "  Neurological: Negative for seizures and tremors.   Psychiatric/Behavioral: Negative for altered mental status. The patient is not nervous/anxious.             Objective:     Vitals:    03/23/18 0905   BP: 130/78   Pulse: 68   Weight: 75.8 kg (167 lb)   Height: 172.7 cm (68\")         Physical Exam   Constitutional: He is oriented to person, place, and time.   HENT:   Head: Normocephalic and atraumatic.   Eyes: Right eye exhibits no discharge. Left eye exhibits no discharge.   Neck: No JVD present. No thyromegaly present.   Cardiovascular: Normal rate and regular rhythm.  Exam reveals no gallop and no friction rub.    Murmur heard.   Systolic murmur is present with a grade of 1/6   Pulmonary/Chest: Effort normal and breath sounds normal. He has no rales.   Abdominal: Soft. Bowel sounds are normal. There is no tenderness.   Musculoskeletal: Normal range of motion. He exhibits no edema or deformity.   Neurological: He is alert and oriented to person, place, and time. He exhibits normal muscle tone.   Skin: Skin is warm and dry. No erythema.   Psychiatric: He has a normal mood and affect. His behavior is normal. Thought content normal.         ECG 12 Lead  Date/Time: 3/23/2018 9:37 AM  Performed by: BARRON ANDREW  Authorized by: BARRON ANDREW   Comparison: compared with previous ECG   Similar to previous ECG  Rhythm: sinus rhythm  Rate: normal  Conduction: conduction normal  ST Segments: ST segments normal  T Waves: T waves normal  QRS axis: normal  Clinical impression: normal ECG            Lab Review:       Assessment:          Diagnosis Plan   1. History of atrial fibrillation     2. Aortic valve sclerosis            Plan:         I don't think I would give him the diagnosis of AF -- this was clearly secondary to the severe infection.  No recurrences No RF   Normal Exam and ecg  He has some calcium on his valve but nary a murmur and no symptoms -- I would simply rec fu w a query of symptoms and basic exam.       "

## 2019-03-27 ENCOUNTER — OFFICE VISIT (OUTPATIENT)
Dept: CARDIOLOGY | Facility: CLINIC | Age: 69
End: 2019-03-27

## 2019-03-27 VITALS
BODY MASS INDEX: 25.46 KG/M2 | WEIGHT: 168 LBS | DIASTOLIC BLOOD PRESSURE: 66 MMHG | SYSTOLIC BLOOD PRESSURE: 112 MMHG | HEIGHT: 68 IN | HEART RATE: 68 BPM

## 2019-03-27 DIAGNOSIS — Z86.79 HISTORY OF ATRIAL FIBRILLATION: ICD-10-CM

## 2019-03-27 DIAGNOSIS — I35.8 AORTIC VALVE SCLEROSIS: Primary | ICD-10-CM

## 2019-03-27 PROCEDURE — 93000 ELECTROCARDIOGRAM COMPLETE: CPT | Performed by: NURSE PRACTITIONER

## 2019-03-27 PROCEDURE — 99213 OFFICE O/P EST LOW 20 MIN: CPT | Performed by: NURSE PRACTITIONER

## 2019-03-27 NOTE — PROGRESS NOTES
Date of Office Visit: 2019  Encounter Provider: KAITY hCacko  Place of Service: [facility] CARDIOLOG[facility]  Patient Name: Hieu Mann  :1950    Chief Complaint   Patient presents with   • Paroxysmal atrial fibrillation   :     HPI: Hieu Mann is a 68 y.o. male who is a patient of Dr. Armenta's and has history of only one episode paroxysmal Afib during illness of [place] Spotted Fever (very ill), hyperlipidemia, and aortic valve sclerosis. He is here for yearly follow-up.    He has had no episodes of Afib. He denies chest pain, dyspnea, palpitations, edema, or lightheadedness.           Past Medical History:   Diagnosis Date   • Aortic valve sclerosis    • Arthritis    • Atrial fibrillation (CMS/HCC)     resolved, periodic follup with Dr. Armenta   • Epigastric hernia    • Heart murmur     NO TREATMENT   • Hyperlipidemia    • [place] spotted fever        Past Surgical History:   Procedure Laterality Date   • COLONOSCOPY N/A     DR. HONEYCUTT   • EPIGASTRIC HERNIA REPAIR N/A 3/9/2018    Procedure: EPIGASTRIC HERNIA REPAIR;  Surgeon: David Finn MD;  Location: [facility] OR [facility];  Service: General   • LAPAROSCOPIC INGUINAL HERNIA REPAIR Left 2015    Dr. Hieu Khan   • REPLACEMENT TOTAL KNEE Left 2012    Dr. David Stein   • REPLACEMENT TOTAL KNEE Right 2003    Right total knee replacement, cementless Hannah Profix-Dr. David Stein   • TOTAL HIP ARTHROPLASTY Right 2009    Dr. David Stein   • TOTAL HIP ARTHROPLASTY Left 2011    Dr. David Stein       Social History     Socioeconomic History   • Marital status:      Spouse name: Not on file   • Number of children: Not on file   • Years of education: Not on file   • Highest education level: Not on file   Tobacco Use   • Smoking status: Never Smoker   • Smokeless tobacco: Never Used   Substance and Sexual Activity   • Alcohol use: No   • Drug use: No   •  "Sexual activity: Defer       Family History   Problem Relation Age of Onset   • Lung cancer Father    • Malig Hyperthermia Neg Hx        Review of Systems   Constitution: Negative for chills, fever and malaise/fatigue.   Cardiovascular: Negative for chest pain, dyspnea on exertion, leg swelling, near-syncope, orthopnea, palpitations, paroxysmal nocturnal dyspnea and syncope.   Respiratory: Negative for cough and shortness of breath.    Musculoskeletal: Negative for joint pain, joint swelling and myalgias.   Gastrointestinal: Negative for abdominal pain, diarrhea, melena, nausea and vomiting.   Genitourinary: Negative for frequency and hematuria.   Neurological: Negative for light-headedness, numbness, paresthesias and seizures.   Allergic/Immunologic: Negative.    All other systems reviewed and are negative.      No Known Allergies      Current Outpatient Medications:   •  aspirin 81 MG tablet, Take 81 mg by mouth daily., Disp: , Rfl:   •  atorvastatin (LIPITOR) 20 MG tablet, Take 20 mg by mouth Every Night., Disp: , Rfl:   •  calcium-vitamin D (OSCAL-500) 500-200 MG-UNIT per tablet, Take 1 tablet by mouth daily., Disp: , Rfl:   •  Multiple Vitamins-Minerals (CENTRUM SILVER ADULT 50+ PO), Take 1 tablet by mouth Daily., Disp: , Rfl:   •  sildenafil (VIAGRA) 25 MG tablet, Take 25 mg by mouth Daily As Needed for erectile dysfunction., Disp: , Rfl:       Objective:     Vitals:    03/27/19 0841   BP: 112/66   Pulse: 68   Weight: 76.2 kg (168 lb)   Height: 172.7 cm (67.99\")     Body mass index is 25.55 kg/m².    PHYSICAL EXAM:    Vitals Reviewed.   General Appearance: No acute distress, well developed and well nourished.   Eyes: Conjunctiva and lids: No erythema, swelling, or discharge. Sclera non-icteric.   HENT: Atraumatic, normocephalic. External eyes, ears, and nose normal.   Respiratory: No signs of respiratory distress. Respiration rhythm and depth normal.   Clear to auscultation. No rales, crackles, rhonchi, or " wheezing auscultated.   Cardiovascular:  Jugular Venous Pressure: Normal  Heart Rate and Rhythm: Normal, Heart Sounds: Normal S1 and S2. No S3 or S4 noted.  Murmur heard I/VI.  Arterial Pulses:  Posterior tibialis and dorsalis pedis pulses normal.   Lower Extremities: No edema noted.  Gastrointestinal:  Abdomen soft, non-distended, non-tender.   Musculoskeletal: Normal movement of extremities  Skin and Nails: General appearance normal. No pallor, cyanosis, diaphoresis. Skin temperature normal. No clubbing of fingernails.   Psychiatric: Patient alert and oriented to person, place, and time. Speech and behavior appropriate. Normal mood and affect.       ECG 12 Lead  Date/Time: 3/27/2019 9:11 AM  Performed by: Adia Broussard APRN  Authorized by: Adia Broussard APRN   Comparison: compared with previous ECG from 3/23/2018  Similar to previous ECG  Rhythm: sinus rhythm  BPM: 68                Assessment:       Diagnosis Plan   1. Aortic valve sclerosis     2. History of atrial fibrillation            Plan:       1. Aortic valve sclerosis per Echo in 2016. Stable; no signs and symptoms of progression.    2. History of one episode of AF in setting of illness; no recurrence.     He is taking 81mg aspirin for primary prevention. Given recent ACC recommendations, we discussed stopping this and encouraged him to discuss with PCP as well.    He will follow up with Dr. Armenta in one year    As always, it has been a pleasure to participate in your patient's care.      Sincerely,         KAITY Antony

## 2020-11-09 ENCOUNTER — OFFICE VISIT (OUTPATIENT)
Dept: CARDIOLOGY | Facility: CLINIC | Age: 70
End: 2020-11-09

## 2020-11-09 VITALS
WEIGHT: 152 LBS | HEART RATE: 58 BPM | BODY MASS INDEX: 23.04 KG/M2 | SYSTOLIC BLOOD PRESSURE: 128 MMHG | DIASTOLIC BLOOD PRESSURE: 74 MMHG | HEIGHT: 68 IN

## 2020-11-09 DIAGNOSIS — Z86.79 HISTORY OF ATRIAL FIBRILLATION: ICD-10-CM

## 2020-11-09 DIAGNOSIS — I35.8 AORTIC VALVE SCLEROSIS: ICD-10-CM

## 2020-11-09 PROCEDURE — 93000 ELECTROCARDIOGRAM COMPLETE: CPT | Performed by: INTERNAL MEDICINE

## 2020-11-09 PROCEDURE — 99213 OFFICE O/P EST LOW 20 MIN: CPT | Performed by: INTERNAL MEDICINE

## 2020-11-09 NOTE — PROGRESS NOTES
Date of Office Visit: 2020  Encounter Provider: Petr Armenta MD  Place of Service: [facility] CARDIOLOGY  Patient Name: Hieu Mann  : 1950    Subjective:     Encounter Date:2020      Patient ID: Hieu Mann is a 70 y.o. male who has a cc of  AF that occurred with an episode of [place] spotted fever. AF has never recurred    He has AV sclerosis.     The patient had a good year.   No anginal chest pain,   No sig ivan,   No soa,   No fainting,  No orthostasis.   No edema.   Exercise tolerance: he walks --     There have been no hospital admission since the last visit.     There have been no bleeding events.       Past Medical History:   Diagnosis Date   • Aortic valve sclerosis    • Arthritis    • Atrial fibrillation (CMS/HCC)     resolved, periodic follup with Dr. Armenta   • Epigastric hernia    • Heart murmur     NO TREATMENT   • Hyperlipidemia    • [place] spotted fever        Social History     Socioeconomic History   • Marital status:      Spouse name: Not on file   • Number of children: Not on file   • Years of education: Not on file   • Highest education level: Not on file   Tobacco Use   • Smoking status: Never Smoker   • Smokeless tobacco: Never Used   Substance and Sexual Activity   • Alcohol use: No   • Drug use: No   • Sexual activity: Defer       Family History   Problem Relation Age of Onset   • Lung cancer Father    • Malig Hyperthermia Neg Hx        Review of Systems   Constitution: Negative for fever and night sweats.   HENT: Negative for ear pain and stridor.    Eyes: Negative for discharge and visual halos.   Cardiovascular: Negative for cyanosis.   Respiratory: Negative for hemoptysis and sputum production.    Hematologic/Lymphatic: Negative for adenopathy.   Skin: Negative for nail changes and unusual hair distribution.   Musculoskeletal: Negative for gout and joint swelling.   Gastrointestinal: Negative for  "bowel incontinence and flatus.   Genitourinary: Negative for dysuria and flank pain.   Neurological: Negative for seizures and tremors.   Psychiatric/Behavioral: Negative for altered mental status. The patient is not nervous/anxious.             Objective:     Vitals:    11/09/20 1026   BP: 128/74   BP Location: Right arm   Patient Position: Sitting   Cuff Size: Adult   Pulse: 58   Weight: 68.9 kg (152 lb)   Height: 172.7 cm (68\")         Eyes:      General:         Right eye: No discharge.         Left eye: No discharge.   HENT:      Head: Normocephalic and atraumatic.   Neck:      Thyroid: No thyromegaly.      Vascular: No JVD.   Pulmonary:      Effort: Pulmonary effort is normal.      Breath sounds: Normal breath sounds. No rales.   Cardiovascular:      Normal rate. Regular rhythm.      Murmurs: There is a grade 2/6 high frequency systolic murmur at the LLSB.      No gallop.   Edema:     Peripheral edema absent.   Abdominal:      General: Bowel sounds are normal.      Palpations: Abdomen is soft.      Tenderness: There is no abdominal tenderness.   Musculoskeletal: Normal range of motion.         General: No deformity.   Skin:     General: Skin is warm and dry.      Findings: No erythema.   Neurological:      Mental Status: Alert and oriented to person, place, and time.      Motor: Normal muscle tone.   Psychiatric:         Behavior: Behavior normal.         Thought Content: Thought content normal.           ECG 12 Lead    Date/Time: 11/9/2020 10:49 AM  Performed by: Petr Armenta MD  Authorized by: Petr Armenta MD   Comparison: compared with previous ECG   Similar to previous ECG  Rhythm: sinus rhythm  Rate: normal  Conduction: conduction normal  ST Segments: ST segments normal  T Waves: T waves normal  QRS axis: normal    Clinical impression: normal ECG            Lab Review:       Assessment:          Diagnosis Plan   1. Aortic valve sclerosis     2. History of atrial fibrillation            Plan: "       He is doing great with no more AF. Since he has no documented af I would not use oral AC     He does have a murmur due to AV sclerosis and it has been 4 years. So I think should check another one to assess the AV.    I also think he should stop taking asa. Based on the 2018 RCTs.

## 2020-12-01 ENCOUNTER — TRANSCRIBE ORDERS (OUTPATIENT)
Dept: ADMINISTRATIVE | Facility: HOSPITAL | Age: 70
End: 2020-12-01

## 2020-12-01 DIAGNOSIS — M25.851 MASS OF JOINT OF RIGHT HIP: Primary | ICD-10-CM

## 2020-12-02 ENCOUNTER — TRANSCRIBE ORDERS (OUTPATIENT)
Dept: LAB | Facility: HOSPITAL | Age: 70
End: 2020-12-02

## 2020-12-02 ENCOUNTER — HOSPITAL ENCOUNTER (OUTPATIENT)
Dept: ULTRASOUND IMAGING | Facility: HOSPITAL | Age: 70
Discharge: HOME | End: 2020-12-02

## 2020-12-02 ENCOUNTER — HOSPITAL ENCOUNTER (OUTPATIENT)
Dept: MRI IMAGING | Facility: HOSPITAL | Age: 70
Discharge: HOME | End: 2020-12-02

## 2020-12-02 ENCOUNTER — LAB (OUTPATIENT)
Dept: LAB | Facility: HOSPITAL | Age: 70
End: 2020-12-02

## 2020-12-02 DIAGNOSIS — H44.399: ICD-10-CM

## 2020-12-02 DIAGNOSIS — M25.851 MASS OF JOINT OF RIGHT HIP: ICD-10-CM

## 2020-12-02 DIAGNOSIS — H44.399: Primary | ICD-10-CM

## 2020-12-02 PROCEDURE — 76882 US LMTD JT/FCL EVL NVASC XTR: CPT

## 2020-12-02 PROCEDURE — 82495 ASSAY OF CHROMIUM: CPT

## 2020-12-02 PROCEDURE — 73721 MRI JNT OF LWR EXTRE W/O DYE: CPT

## 2020-12-02 PROCEDURE — 83018 HEAVY METAL QUAN EACH NES: CPT

## 2020-12-02 PROCEDURE — 36415 COLL VENOUS BLD VENIPUNCTURE: CPT

## 2020-12-04 LAB — CR SERPL-MCNC: 1.6 UG/L (ref 0.1–2.1)

## 2020-12-07 ENCOUNTER — HOSPITAL ENCOUNTER (OUTPATIENT)
Dept: CARDIOLOGY | Facility: HOSPITAL | Age: 70
Discharge: HOME | End: 2020-12-07
Admitting: INTERNAL MEDICINE

## 2020-12-07 VITALS
HEART RATE: 68 BPM | BODY MASS INDEX: 22.43 KG/M2 | OXYGEN SATURATION: 96 % | HEIGHT: 68 IN | WEIGHT: 148 LBS | SYSTOLIC BLOOD PRESSURE: 110 MMHG | DIASTOLIC BLOOD PRESSURE: 60 MMHG

## 2020-12-07 DIAGNOSIS — I35.8 AORTIC VALVE SCLEROSIS: ICD-10-CM

## 2020-12-07 DIAGNOSIS — Z86.79 HISTORY OF ATRIAL FIBRILLATION: ICD-10-CM

## 2020-12-07 LAB — COBALT SERPL-MCNC: 1.2 UG/L (ref 0–0.9)

## 2020-12-07 PROCEDURE — 93306 TTE W/DOPPLER COMPLETE: CPT | Performed by: INTERNAL MEDICINE

## 2020-12-07 PROCEDURE — 93306 TTE W/DOPPLER COMPLETE: CPT

## 2020-12-08 ENCOUNTER — TELEPHONE (OUTPATIENT)
Dept: CARDIOLOGY | Facility: CLINIC | Age: 70
End: 2020-12-08

## 2020-12-08 LAB
AORTIC ARCH: 2.6 CM
AORTIC DIMENSIONLESS INDEX: 0.2 (DI)
ASCENDING AORTA: 3 CM
BH CV ECHO MEAS - ACS: 0.92 CM
BH CV ECHO MEAS - AO MAX PG (FULL): 63 MMHG
BH CV ECHO MEAS - AO MAX PG: 66.2 MMHG
BH CV ECHO MEAS - AO MEAN PG (FULL): 35.6 MMHG
BH CV ECHO MEAS - AO MEAN PG: 37.5 MMHG
BH CV ECHO MEAS - AO ROOT AREA (BSA CORRECTED): 1.6
BH CV ECHO MEAS - AO ROOT AREA: 6.4 CM^2
BH CV ECHO MEAS - AO ROOT DIAM: 2.9 CM
BH CV ECHO MEAS - AO V2 MAX: 406.7 CM/SEC
BH CV ECHO MEAS - AO V2 MEAN: 288.5 CM/SEC
BH CV ECHO MEAS - AO V2 VTI: 98.3 CM
BH CV ECHO MEAS - ASC AORTA: 3 CM
BH CV ECHO MEAS - AVA(I,A): 0.71 CM^2
BH CV ECHO MEAS - AVA(I,D): 0.71 CM^2
BH CV ECHO MEAS - AVA(V,A): 0.73 CM^2
BH CV ECHO MEAS - AVA(V,D): 0.73 CM^2
BH CV ECHO MEAS - BSA(HAYCOCK): 1.8 M^2
BH CV ECHO MEAS - BSA: 1.8 M^2
BH CV ECHO MEAS - BZI_BMI: 22.5 KILOGRAMS/M^2
BH CV ECHO MEAS - BZI_METRIC_HEIGHT: 172.7 CM
BH CV ECHO MEAS - BZI_METRIC_WEIGHT: 67.1 KG
BH CV ECHO MEAS - EDV(MOD-SP2): 90 ML
BH CV ECHO MEAS - EDV(MOD-SP4): 86 ML
BH CV ECHO MEAS - EDV(TEICH): 106.8 ML
BH CV ECHO MEAS - EF(CUBED): 80.5 %
BH CV ECHO MEAS - EF(MOD-BP): 58 %
BH CV ECHO MEAS - EF(MOD-SP2): 62.2 %
BH CV ECHO MEAS - EF(MOD-SP4): 57 %
BH CV ECHO MEAS - EF(TEICH): 72.9 %
BH CV ECHO MEAS - ESV(MOD-SP2): 34 ML
BH CV ECHO MEAS - ESV(MOD-SP4): 37 ML
BH CV ECHO MEAS - ESV(TEICH): 29 ML
BH CV ECHO MEAS - FS: 42 %
BH CV ECHO MEAS - IVS/LVPW: 1
BH CV ECHO MEAS - IVSD: 1 CM
BH CV ECHO MEAS - LAT PEAK E' VEL: 9.8 CM/SEC
BH CV ECHO MEAS - LV DIASTOLIC VOL/BSA (35-75): 47.8 ML/M^2
BH CV ECHO MEAS - LV MASS(C)D: 170.9 GRAMS
BH CV ECHO MEAS - LV MASS(C)DI: 95 GRAMS/M^2
BH CV ECHO MEAS - LV MAX PG: 3.2 MMHG
BH CV ECHO MEAS - LV MEAN PG: 1.8 MMHG
BH CV ECHO MEAS - LV SYSTOLIC VOL/BSA (12-30): 20.6 ML/M^2
BH CV ECHO MEAS - LV V1 MAX: 89.2 CM/SEC
BH CV ECHO MEAS - LV V1 MEAN: 63.5 CM/SEC
BH CV ECHO MEAS - LV V1 VTI: 21 CM
BH CV ECHO MEAS - LVIDD: 4.8 CM
BH CV ECHO MEAS - LVIDS: 2.8 CM
BH CV ECHO MEAS - LVLD AP2: 8.1 CM
BH CV ECHO MEAS - LVLD AP4: 8.3 CM
BH CV ECHO MEAS - LVLS AP2: 6.2 CM
BH CV ECHO MEAS - LVLS AP4: 6.9 CM
BH CV ECHO MEAS - LVOT AREA (M): 3.5 CM^2
BH CV ECHO MEAS - LVOT AREA: 3.3 CM^2
BH CV ECHO MEAS - LVOT DIAM: 2.1 CM
BH CV ECHO MEAS - LVPWD: 0.99 CM
BH CV ECHO MEAS - MED PEAK E' VEL: 7.8 CM/SEC
BH CV ECHO MEAS - MR MAX PG: 79.2 MMHG
BH CV ECHO MEAS - MR MAX VEL: 444.9 CM/SEC
BH CV ECHO MEAS - MV A DUR: 0.1 SEC
BH CV ECHO MEAS - MV A MAX VEL: 81 CM/SEC
BH CV ECHO MEAS - MV DEC SLOPE: 347.7 CM/SEC^2
BH CV ECHO MEAS - MV DEC TIME: 0.22 SEC
BH CV ECHO MEAS - MV E MAX VEL: 84.2 CM/SEC
BH CV ECHO MEAS - MV E/A: 1
BH CV ECHO MEAS - MV MAX PG: 3 MMHG
BH CV ECHO MEAS - MV MEAN PG: 1.4 MMHG
BH CV ECHO MEAS - MV P1/2T MAX VEL: 83.8 CM/SEC
BH CV ECHO MEAS - MV P1/2T: 70.6 MSEC
BH CV ECHO MEAS - MV V2 MAX: 86.9 CM/SEC
BH CV ECHO MEAS - MV V2 MEAN: 56.6 CM/SEC
BH CV ECHO MEAS - MV V2 VTI: 32.1 CM
BH CV ECHO MEAS - MVA P1/2T LCG: 2.6 CM^2
BH CV ECHO MEAS - MVA(P1/2T): 3.1 CM^2
BH CV ECHO MEAS - MVA(VTI): 2.2 CM^2
BH CV ECHO MEAS - PA ACC TIME: 0.11 SEC
BH CV ECHO MEAS - PA MAX PG (FULL): 0.7 MMHG
BH CV ECHO MEAS - PA MAX PG: 2.6 MMHG
BH CV ECHO MEAS - PA PR(ACCEL): 30.3 MMHG
BH CV ECHO MEAS - PA V2 MAX: 79.9 CM/SEC
BH CV ECHO MEAS - PULM A REVS DUR: 0.08 SEC
BH CV ECHO MEAS - PULM A REVS VEL: 42.8 CM/SEC
BH CV ECHO MEAS - PULM DIAS VEL: 45 CM/SEC
BH CV ECHO MEAS - PULM S/D: 1.5
BH CV ECHO MEAS - PULM SYS VEL: 69 CM/SEC
BH CV ECHO MEAS - PVA(V,A): 2.5 CM^2
BH CV ECHO MEAS - PVA(V,D): 2.5 CM^2
BH CV ECHO MEAS - QP/QS: 0.61
BH CV ECHO MEAS - RAP SYSTOLE: 8 MMHG
BH CV ECHO MEAS - RV MAX PG: 1.9 MMHG
BH CV ECHO MEAS - RV MEAN PG: 1 MMHG
BH CV ECHO MEAS - RV V1 MAX: 68.1 CM/SEC
BH CV ECHO MEAS - RV V1 MEAN: 47.3 CM/SEC
BH CV ECHO MEAS - RV V1 VTI: 14.5 CM
BH CV ECHO MEAS - RVOT AREA: 3 CM^2
BH CV ECHO MEAS - RVOT DIAM: 1.9 CM
BH CV ECHO MEAS - RVSP: 32.7 MMHG
BH CV ECHO MEAS - SI(AO): 350.5 ML/M^2
BH CV ECHO MEAS - SI(CUBED): 49.1 ML/M^2
BH CV ECHO MEAS - SI(LVOT): 38.9 ML/M^2
BH CV ECHO MEAS - SI(MOD-SP2): 31.1 ML/M^2
BH CV ECHO MEAS - SI(MOD-SP4): 27.2 ML/M^2
BH CV ECHO MEAS - SI(TEICH): 43.3 ML/M^2
BH CV ECHO MEAS - SUP REN AO DIAM: 2 CM
BH CV ECHO MEAS - SV(AO): 630.4 ML
BH CV ECHO MEAS - SV(CUBED): 88.3 ML
BH CV ECHO MEAS - SV(LVOT): 69.9 ML
BH CV ECHO MEAS - SV(MOD-SP2): 56 ML
BH CV ECHO MEAS - SV(MOD-SP4): 49 ML
BH CV ECHO MEAS - SV(RVOT): 43 ML
BH CV ECHO MEAS - SV(TEICH): 77.9 ML
BH CV ECHO MEAS - TAPSE (>1.6): 2.2 CM
BH CV ECHO MEAS - TR MAX VEL: 248.4 CM/SEC
BH CV ECHO MEASUREMENTS AVERAGE E/E' RATIO: 9.57
BH CV VAS BP RIGHT ARM: NORMAL MMHG
BH CV XLRA - RV BASE: 3.1 CM
BH CV XLRA - RV LENGTH: 8.1 CM
BH CV XLRA - RV MID: 3 CM
BH CV XLRA - TDI S': 11.9 CM/SEC
LEFT ATRIUM VOLUME INDEX: 24 ML/M2
MAXIMAL PREDICTED HEART RATE: 149 BPM
SINUS: 2.9 CM
STJ: 2.8 CM
STRESS TARGET HR: 127 BPM

## 2020-12-08 NOTE — TELEPHONE ENCOUNTER
Pt is scheduled for a hip surgery with Dr. Stein 12-15. They are asking if pt is ok from a cardiac standpoint to move forward with procedure. Pt has NO history of stroke or valve replacement...Desi

## 2020-12-08 NOTE — PROGRESS NOTES
Desi, can you get him an appointment with Dr. Naranjo, or Mariano or Tatiana for his aortic valve     ALso -- no ortho surgery right now.

## 2020-12-09 ENCOUNTER — TRANSCRIBE ORDERS (OUTPATIENT)
Dept: PREADMISSION TESTING | Facility: HOSPITAL | Age: 70
End: 2020-12-09

## 2020-12-09 DIAGNOSIS — Z01.818 OTHER SPECIFIED PRE-OPERATIVE EXAMINATION: Primary | ICD-10-CM

## 2020-12-09 NOTE — TELEPHONE ENCOUNTER
LANDONM for pt to call. JM does not want him to have any procedures before seeing the valve team...Desi

## 2020-12-10 NOTE — TELEPHONE ENCOUNTER
Patient came into lobby today very concerned that he hasn't heard back about s/c for hip surgery.  He is scheduled for Pre-op testing tomorrow morning, surgery is on 12/15 with Dr. Stein.  He would like to speak with you today.    He can be reached on his cell phone at any time.  His # is [phone].    Thank you!

## 2020-12-11 ENCOUNTER — HOSPITAL ENCOUNTER (OUTPATIENT)
Dept: GENERAL RADIOLOGY | Facility: HOSPITAL | Age: 70
Discharge: HOME | End: 2020-12-11

## 2020-12-11 ENCOUNTER — APPOINTMENT (OUTPATIENT)
Dept: PREADMISSION TESTING | Facility: HOSPITAL | Age: 70
End: 2020-12-11

## 2020-12-11 VITALS
SYSTOLIC BLOOD PRESSURE: 128 MMHG | TEMPERATURE: 97.5 F | BODY MASS INDEX: 23.04 KG/M2 | WEIGHT: 152 LBS | HEIGHT: 68 IN | DIASTOLIC BLOOD PRESSURE: 63 MMHG | HEART RATE: 63 BPM | OXYGEN SATURATION: 98 % | RESPIRATION RATE: 16 BRPM

## 2020-12-11 LAB
ABO GROUP BLD: NORMAL
ALBUMIN SERPL-MCNC: 4.1 G/DL (ref 3.5–5.2)
ALBUMIN/GLOB SERPL: 1.3 G/DL
ALP SERPL-CCNC: 77 U/L (ref 39–117)
ALT SERPL W P-5'-P-CCNC: 20 U/L (ref 1–41)
ANION GAP SERPL CALCULATED.3IONS-SCNC: 5.8 MMOL/L (ref 5–15)
APTT PPP: 30.1 SECONDS (ref 22.7–35.4)
AST SERPL-CCNC: 24 U/L (ref 1–40)
BASOPHILS # BLD AUTO: 0.03 10*3/MM3 (ref 0–0.2)
BASOPHILS NFR BLD AUTO: 0.5 % (ref 0–1.5)
BILIRUB SERPL-MCNC: 0.4 MG/DL (ref 0–1.2)
BILIRUB UR QL STRIP: NEGATIVE
BLD GP AB SCN SERPL QL: NEGATIVE
BUN SERPL-MCNC: 12 MG/DL (ref 8–23)
BUN/CREAT SERPL: 17.9 (ref 7–25)
CALCIUM SPEC-SCNC: 9.2 MG/DL (ref 8.6–10.5)
CHLORIDE SERPL-SCNC: 104 MMOL/L (ref 98–107)
CLARITY UR: ABNORMAL
CO2 SERPL-SCNC: 29.2 MMOL/L (ref 22–29)
COLOR UR: YELLOW
CREAT SERPL-MCNC: 0.67 MG/DL (ref 0.76–1.27)
CRP SERPL-MCNC: 0.13 MG/DL (ref 0–0.5)
DEPRECATED RDW RBC AUTO: 42 FL (ref 37–54)
EOSINOPHIL # BLD AUTO: 0.25 10*3/MM3 (ref 0–0.4)
EOSINOPHIL NFR BLD AUTO: 3.8 % (ref 0.3–6.2)
ERYTHROCYTE [DISTWIDTH] IN BLOOD BY AUTOMATED COUNT: 13.3 % (ref 12.3–15.4)
ERYTHROCYTE [SEDIMENTATION RATE] IN BLOOD: 7 MM/HR (ref 0–20)
GFR SERPL CREATININE-BSD FRML MDRD: 117 ML/MIN/1.73
GLOBULIN UR ELPH-MCNC: 3.2 GM/DL
GLUCOSE SERPL-MCNC: 103 MG/DL (ref 65–99)
GLUCOSE UR STRIP-MCNC: NEGATIVE MG/DL
HCT VFR BLD AUTO: 41.3 % (ref 37.5–51)
HGB BLD-MCNC: 14.1 G/DL (ref 13–17.7)
HGB UR QL STRIP.AUTO: NEGATIVE
IMM GRANULOCYTES # BLD AUTO: 0.01 10*3/MM3 (ref 0–0.05)
IMM GRANULOCYTES NFR BLD AUTO: 0.2 % (ref 0–0.5)
INR PPP: 0.93 (ref 0.9–1.1)
KETONES UR QL STRIP: NEGATIVE
LEUKOCYTE ESTERASE UR QL STRIP.AUTO: NEGATIVE
LYMPHOCYTES # BLD AUTO: 1.36 10*3/MM3 (ref 0.7–3.1)
LYMPHOCYTES NFR BLD AUTO: 20.8 % (ref 19.6–45.3)
MCH RBC QN AUTO: 29.1 PG (ref 26.6–33)
MCHC RBC AUTO-ENTMCNC: 34.1 G/DL (ref 31.5–35.7)
MCV RBC AUTO: 85.3 FL (ref 79–97)
MONOCYTES # BLD AUTO: 0.46 10*3/MM3 (ref 0.1–0.9)
MONOCYTES NFR BLD AUTO: 7 % (ref 5–12)
NEUTROPHILS NFR BLD AUTO: 4.43 10*3/MM3 (ref 1.7–7)
NEUTROPHILS NFR BLD AUTO: 67.7 % (ref 42.7–76)
NITRITE UR QL STRIP: NEGATIVE
NRBC BLD AUTO-RTO: 0 /100 WBC (ref 0–0.2)
PH UR STRIP.AUTO: 7.5 [PH] (ref 5–8)
PLATELET # BLD AUTO: 172 10*3/MM3 (ref 140–450)
PMV BLD AUTO: 11.1 FL (ref 6–12)
POTASSIUM SERPL-SCNC: 4.1 MMOL/L (ref 3.5–5.2)
PROT SERPL-MCNC: 7.3 G/DL (ref 6–8.5)
PROT UR QL STRIP: NEGATIVE
PROTHROMBIN TIME: 12.3 SECONDS (ref 11.7–14.2)
RBC # BLD AUTO: 4.84 10*6/MM3 (ref 4.14–5.8)
RH BLD: POSITIVE
SODIUM SERPL-SCNC: 139 MMOL/L (ref 136–145)
SP GR UR STRIP: 1.02 (ref 1–1.03)
T&S EXPIRATION DATE: NORMAL
UROBILINOGEN UR QL STRIP: ABNORMAL
WBC # BLD AUTO: 6.54 10*3/MM3 (ref 3.4–10.8)

## 2020-12-11 PROCEDURE — 81003 URINALYSIS AUTO W/O SCOPE: CPT

## 2020-12-11 PROCEDURE — 73502 X-RAY EXAM HIP UNI 2-3 VIEWS: CPT

## 2020-12-11 PROCEDURE — 86850 RBC ANTIBODY SCREEN: CPT

## 2020-12-11 PROCEDURE — 71046 X-RAY EXAM CHEST 2 VIEWS: CPT

## 2020-12-11 PROCEDURE — 85025 COMPLETE CBC W/AUTO DIFF WBC: CPT

## 2020-12-11 PROCEDURE — 85652 RBC SED RATE AUTOMATED: CPT

## 2020-12-11 PROCEDURE — 86140 C-REACTIVE PROTEIN: CPT

## 2020-12-11 PROCEDURE — 80053 COMPREHEN METABOLIC PANEL: CPT

## 2020-12-11 PROCEDURE — 36415 COLL VENOUS BLD VENIPUNCTURE: CPT

## 2020-12-11 PROCEDURE — 86901 BLOOD TYPING SEROLOGIC RH(D): CPT

## 2020-12-11 PROCEDURE — 86900 BLOOD TYPING SEROLOGIC ABO: CPT

## 2020-12-11 PROCEDURE — 87086 URINE CULTURE/COLONY COUNT: CPT

## 2020-12-11 PROCEDURE — 85610 PROTHROMBIN TIME: CPT

## 2020-12-11 PROCEDURE — 85730 THROMBOPLASTIN TIME PARTIAL: CPT

## 2020-12-11 ASSESSMENT — HOOS JR
HOOS JR SCORE: 0
HOOS JR SCORE: 100

## 2020-12-11 NOTE — DISCHARGE INSTRUCTIONS
Take the following medications the morning of surgery:  NONE    ARRIVE TO MAIN OR DESK 12- AT 8:00AM    If you are on prescription narcotic pain medication to control your pain you may also take that medication the morning of surgery.    General Instructions:  • Do not eat solid food after midnight the night before surgery.  • You may drink clear liquids day of surgery but must stop at least one hour before your hospital arrival time.(7:00AM)  • It is beneficial for you to have a clear drink that contains carbohydrates the day of surgery.  We suggest a 12 to 20 ounce bottle of Gatorade or Powerade for non-diabetic patients or a 12 to 20 ounce bottle of G2 or Powerade Zero for diabetic patients. (Pediatric patients, are not advised to drink a 12 to 20 ounce carbohydrate drink)    Clear liquids are liquids you can see through.  Nothing red in color.     Plain water                               Sports drinks  Sodas                                   Gelatin (Jell-O)  Fruit juices without pulp such as white grape juice and apple juice  Popsicles that contain no fruit or yogurt  Tea or coffee (no cream or milk added)  Gatorade / Powerade  G2 / Powerade Zero    • Infants may have breast milk up to four hours before surgery.  • Infants drinking formula may drink formula up to six hours before surgery.   • Patients who avoid smoking, chewing tobacco and alcohol for 4 weeks prior to surgery have a reduced risk of post-operative complications.  Quit smoking as many days before surgery as you can.  • Do not smoke, use chewing tobacco or drink alcohol the day of surgery.   • If applicable bring your C-PAP/ BI-PAP machine.  • Bring any papers given to you in the doctor’s office.  • Wear clean comfortable clothes.  • Do not wear contact lenses, false eyelashes or make-up.  Bring a case for your glasses.   • Bring crutches or walker if applicable.  • Remove all piercings.  Leave jewelry and any other valuables at  home.  • Hair extensions with metal clips must be removed prior to surgery.  • The Pre-Admission Testing nurse will instruct you to bring medications if unable to obtain an accurate list in Pre-Admission Testing.        If you were given a blood bank ID arm band remember to bring it with you the day of surgery.    Preventing a Surgical Site Infection:  • For 2 to 3 days before surgery, avoid shaving with a razor because the razor can irritate skin and make it easier to develop an infection.    • Any areas of open skin can increase the risk of a post-operative wound infection by allowing bacteria to enter and travel throughout the body.  Notify your surgeon if you have any skin wounds / rashes even if it is not near the expected surgical site.  The area will need assessed to determine if surgery should be delayed until it is healed.  • The night prior to surgery shower using a fresh bar of anti-bacterial soap (such as Dial) and clean washcloth.  Sleep in a clean bed with clean clothing.  Do not allow pets to sleep with you.  • Shower on the morning of surgery using a fresh bar of anti-bacterial soap (such as Dial) and clean washcloth.  Dry with a clean towel and dress in clean clothing.  • Ask your surgeon if you will be receiving antibiotics prior to surgery.  • Make sure you, your family, and all healthcare providers clean their hands with soap and water or an alcohol based hand  before caring for you or your wound.    Day of surgery:  Your arrival time is approximately two hours before your scheduled surgery time.  Upon arrival, a Pre-op nurse and Anesthesiologist will review your health history, obtain vital signs, and answer questions you may have.  The only belongings needed at this time will be a list of your home medications and if applicable your C-PAP/BI-PAP machine.  A Pre-op nurse will start an IV and you may receive medication in preparation for surgery, including something to help you relax.      Please be aware that surgery does come with discomfort.  We want to make every effort to control your discomfort so please discuss any uncontrolled symptoms with your nurse.   Your doctor will most likely have prescribed pain medications.      If you are going home after surgery you will receive individualized written care instructions before being discharged.  A responsible adult must drive you to and from the hospital on the day of your surgery and stay with you for 24 hours.    If you are staying overnight following surgery, you will be transported to your hospital room following the recovery period.  [facility] has all private rooms.    If you have any questions please call Pre-Admission Testing at [phone].  Deductibles and co-payments are collected on the day of service. Please be prepared to pay the required co-pay, deductible or deposit on the day of service as defined by your plan.    Patient Education for Self-Quarantine Process    Following your COVID testing, we strongly recommend that you do not leave your home after you have been tested for COVID except to get medical care. This includes not going to work, school or to public areas.  If this is not possible for you to do please limit your activities to only required outings.  Be sure to wear a mask when you are with other people, practice social distancing and wash your hands frequently.      The following items provide additional details to keep you safe.  • Wash your hands with soap and water frequently for at least 20 seconds.   • Avoid touching your eyes, nose and mouth with unwashed hands.  • Do not share anything - utensils, towels, food from the same bowl.   • Have your own utensils, drinking glass, dishes, towels and bedding.   • Do not have visitors.   • Do use FaceTime to stay in touch with family and friends.  • You should stay in a specific room away from others if possible.   • Stay at least 6 feet away from others  in the home if you cannot have a dedicated room to yourself.   • Do not snuggle with your pet. While the CDC says there is no evidence that pets can spread COVID-19 or be infected from humans, it is probably best to avoid “petting, snuggling, being kissed or licked and sharing food (during self-quarantine)”, according to the CDC.   • Sanitize household surfaces daily. Include all high touch areas (door handles, light switches, phones, countertops, etc.)  • Do not share a bathroom with others, if possible.   • Wear a mask around others in your home if you are unable to stay in a separate room or 6 feet apart. If  you are unable to wear a mask, have your family member wear a mask if they must be within 6 feet of you.   Call your surgeon immediately if you experience any of the following symptoms:  • Sore Throat  • Shortness of Breath or difficulty breathing  • Cough  • Chills  • Body soreness or muscle pain  • Headache  • Fever  • New loss of taste or smell  • Do not arrive for your surgery ill.  Your procedure will need to be rescheduled to another time.  You will need to call your physician before the day of surgery to avoid any unnecessary exposure to hospital staff as well as other patients.    CHLORHEXIDINE CLOTH INSTRUCTIONS  The morning of surgery follow these instructions using the Chlorhexidine cloths you've been given.  These steps reduce bacteria on the body.  Do not use the cloths near your eyes, ears mouth, genitalia or on open wounds.  Throw the cloths away after use but do not try to flush them down a toilet.      • Open and remove one cloth at a time from the package.    • Leave the cloth unfolded and begin the bathing.  • Massage the skin with the cloths using gentle pressure to remove bacteria.  Do not scrub harshly.   • Follow the steps below with one 2% CHG cloth per area (6 total cloths).  • One cloth for neck, shoulders and chest.  • One cloth for both arms, hands, fingers and underarms (do  underarms last).  • One cloth for the abdomen followed by groin.  • One cloth for right leg and foot including between the toes.  • One cloth for left leg and foot including between the toes.  • The last cloth is to be used for the back of the neck, back and buttocks.    Allow the CHG to air dry 3 minutes on the skin which will give it time to work and decrease the chance of irritation.  The skin may feel sticky until it is dry.  Do not rinse with water or any other liquid or you will lose the beneficial effects of the CHG.  If mild skin irritation occurs, do rinse the skin to remove the CHG.  Report this to the nurse at time of admission.  Do not apply lotions, creams, ointments, deodorants or perfumes after using the clothes. Dress in clean clothes before coming to the hospital.    BACTROBAN NASAL OINTMENT  There are many germs normally in your nose. Bactroban is an ointment that will help reduce these germs. Please follow these instructions for Bactroban use:      ____The day before surgery in the morning  Date________    ____The day before surgery in the evening              Date________    ____The day of surgery in the morning    Date________    **Squirt ½ package of Bactroban Ointment onto a cotton applicator and apply to inside of 1st nostril.  Squirt the remaining Bactroban and apply to the inside of the other nostril.

## 2020-12-12 ENCOUNTER — LAB (OUTPATIENT)
Dept: LAB | Facility: HOSPITAL | Age: 70
End: 2020-12-12

## 2020-12-12 DIAGNOSIS — Z01.818 OTHER SPECIFIED PRE-OPERATIVE EXAMINATION: ICD-10-CM

## 2020-12-12 LAB — BACTERIA SPEC AEROBE CULT: NO GROWTH

## 2020-12-12 PROCEDURE — C9803 HOPD COVID-19 SPEC COLLECT: HCPCS

## 2020-12-12 PROCEDURE — U0004 COV-19 TEST NON-CDC HGH THRU: HCPCS

## 2020-12-14 ENCOUNTER — OFFICE VISIT (OUTPATIENT)
Dept: CARDIOLOGY | Facility: CLINIC | Age: 70
End: 2020-12-14

## 2020-12-14 ENCOUNTER — TELEPHONE (OUTPATIENT)
Dept: CARDIOLOGY | Facility: CLINIC | Age: 70
End: 2020-12-14

## 2020-12-14 VITALS
BODY MASS INDEX: 22.73 KG/M2 | HEIGHT: 68 IN | WEIGHT: 150 LBS | HEART RATE: 54 BPM | SYSTOLIC BLOOD PRESSURE: 148 MMHG | DIASTOLIC BLOOD PRESSURE: 72 MMHG

## 2020-12-14 DIAGNOSIS — I35.0 NONRHEUMATIC AORTIC VALVE STENOSIS: Primary | ICD-10-CM

## 2020-12-14 DIAGNOSIS — E78.5 HYPERLIPIDEMIA LDL GOAL <100: ICD-10-CM

## 2020-12-14 LAB — SARS-COV-2 RNA RESP QL NAA+PROBE: NOT DETECTED

## 2020-12-14 PROCEDURE — 99204 OFFICE O/P NEW MOD 45 MIN: CPT | Performed by: INTERNAL MEDICINE

## 2020-12-14 NOTE — TELEPHONE ENCOUNTER
I have spoken to a number of colleagues about his aortic stenosis.  It is severe but not critical.  He is not at the point of requiring aortic valve surgery.  \  I have spoken to Dr. Stein and he apparently is going to need to operate on this hip.    The strategy will be to try and minimize hypotension given his aortic stenosis.  He does have a normal LV function.    Obviously the valve disease makes him higher risk for surgery but given this large problem with his hip, it is reasonable to proceed with surgery.

## 2020-12-14 NOTE — PROGRESS NOTES
Hieu Mann  1950  Date of Office Visit: 12/14/20  Encounter Provider: Master Quintana MD  Place of Service: [facility] CARDIOLOGY      CHIEF COMPLAINT:  Severe degenerative aortic valve stenosis    HISTORY OF PRESENT ILLNESS: Dear Petr and Dr. Pallares,    I had the pleasure of seeing your patient in consultation.  The patient is a very pleasant and active 70-year-old male with a medical history of atrial fibrillation previously documented in the setting of [place] Spotted Fever.  This has not recurred.  He was evaluated by Dr. Armenta and subsequently was set up for a repeat echocardiogram secondary to previously documented aortic valve sclerosis in 2016.   He was noted to have to have severe degenerative aortic valve stenosis with a mean gradient across his aortic valve of 38 mmHg and a peak velocity of 4.1 m/s along with a DOI is 0.2.  His ejection fraction was normal.  His left ventricular size was normal.  He states he has been doing well and is very active.  He denies any dyspnea on exertion or chest pain.  He has been walking a mile or 2 a day without any limitation.  He has no symptoms that I can ascertain.      Review of Systems   Constitution: Negative for fever and malaise/fatigue.   HENT: Negative for nosebleeds and sore throat.    Eyes: Negative for blurred vision and double vision.   Cardiovascular: Negative for chest pain, claudication, palpitations and syncope.   Respiratory: Negative for cough, shortness of breath and snoring.    Endocrine: Negative for cold intolerance, heat intolerance and polydipsia.   Skin: Negative for itching, poor wound healing and rash.   Musculoskeletal: Negative for joint pain, joint swelling, muscle weakness and myalgias.   Gastrointestinal: Negative for abdominal pain, melena, nausea and vomiting.   Neurological: Negative for light-headedness, loss of balance, seizures, vertigo and weakness.  "  Psychiatric/Behavioral: Negative for altered mental status and depression.          Past Medical History:   Diagnosis Date   • Aortic valve stenosis    • Arthritis    • Elevated cholesterol    • Epigastric hernia    • Heart murmur     NO TREATMENT   • History of irregular heartbeat     A-FIB   • History of [place] spotted fever    • Hyperlipidemia    • Swelling     RIGHT HIP AREA       The following portions of the patient's history were reviewed and updated as appropriate: Social history , Family history and Surgical history     Current Outpatient Medications on File Prior to Visit   Medication Sig Dispense Refill   • atorvastatin (LIPITOR) 20 MG tablet Take 20 mg by mouth Every Night.     • Chlorhexidine Gluconate 2 % pads Apply  topically. AS DIRECTED PAT     • Cholecalciferol (VITAMIN D3 PO) Take 3 tablets by mouth Daily. HOLD FOR SURGERY     • Multiple Vitamins-Minerals (CENTRUM SILVER ADULT 50+ PO) Take 1 tablet by mouth Daily. HOLD FOR SURGERY     • mupirocin (BACTROBAN) 2 % ointment Apply  topically to the appropriate area as directed 3 (Three) Times a Day. AS DIRECTED PAT     • sildenafil (VIAGRA) 25 MG tablet Take 25 mg by mouth Daily As Needed for Erectile Dysfunction. HOLD FOR SURGERY       No current facility-administered medications on file prior to visit.        No Known Allergies    Vitals:    12/14/20 1241   BP: 148/72   Pulse: 54   Weight: 68 kg (150 lb)   Height: 172.7 cm (68\")     Constitutional:       Appearance: Well-developed.   Eyes:      General: No scleral icterus.     Conjunctiva/sclera: Conjunctivae normal.   HENT:      Head: Normocephalic and atraumatic.   Neck:      Musculoskeletal: Normal range of motion and neck supple.      Thyroid: No thyromegaly.      Vascular: Normal carotid pulses. No carotid bruit, hepatojugular reflux or JVD.      Trachea: No tracheal deviation.   Pulmonary:      Effort: No respiratory distress.      Breath sounds: Normal breath sounds. No decreased " breath sounds. No wheezing. No rhonchi. No rales.   Chest:      Chest wall: Not tender to palpatation.   Cardiovascular:      Normal rate. Regular rhythm.      Murmurs: There is a grade 3 to 6/6 mid to late systolic murmur.      No gallop.   Pulses:     Carotid: 2+ bilaterally.     Radial: 2+ bilaterally.     Femoral: 2+ bilaterally.     Dorsalis pedis: 2+ bilaterally.     Posterior tibial: 2+ bilaterally.  Edema:     Peripheral edema absent.   Abdominal:      General: Bowel sounds are normal. There is no distension.      Palpations: Abdomen is soft.      Tenderness: There is no abdominal tenderness. There is no rebound.   Musculoskeletal:         General: No deformity.   Skin:     Findings: No erythema or rash.   Neurological:      Mental Status: Alert and oriented to person, place, and time.      Sensory: No sensory deficit.   Psychiatric:         Behavior: Behavior normal.         Lab Results   Component Value Date    WBC 6.54 12/11/2020    HGB 14.1 12/11/2020    HCT 41.3 12/11/2020    MCV 85.3 12/11/2020     12/11/2020       Lab Results   Component Value Date    GLUCOSE 103 (H) 12/11/2020    BUN 12 12/11/2020    CREATININE 0.67 (L) 12/11/2020    EGFRIFNONA 117 12/11/2020    BCR 17.9 12/11/2020    K 4.1 12/11/2020    CO2 29.2 (H) 12/11/2020    CALCIUM 9.2 12/11/2020    ALBUMIN 4.10 12/11/2020    AST 24 12/11/2020    ALT 20 12/11/2020       Lab Results   Component Value Date    GLUCOSE 103 (H) 12/11/2020    CALCIUM 9.2 12/11/2020     12/11/2020    K 4.1 12/11/2020    CO2 29.2 (H) 12/11/2020     12/11/2020    BUN 12 12/11/2020    CREATININE 0.67 (L) 12/11/2020    EGFRIFNONA 117 12/11/2020    BCR 17.9 12/11/2020    ANIONGAP 5.8 12/11/2020       No results found for: CHOL, CHLPL, TRIG, HDL, LDL, LDLDIRECT    Procedures     Results for orders placed during the hospital encounter of 12/07/20   Adult Transthoracic Echo Complete W/ Cont if Necessary Per Protocol    Narrative · Calculated left  ventricular EF = 58% Estimated left ventricular EF was   in agreement with the calculated left ventricular EF. Left ventricular   systolic function is normal. Normal left ventricular cavity size noted.   Left ventricular wall thickness is consistent with mild concentric   hypertrophy. All left ventricular wall segments contract normally. Left   ventricular diastolic function was indeterminate.  · The aortic valve is abnormal in structure. There is severe thickening of   the aortic valve. Trace to mild aortic valve regurgitation is present.   Severe aortic valve stenosis is present.          DISCUSSION/SUMMARYA very pleasant 70-year-old male with a medical history of severe degenerative aortic valve stenosis and atrial fibrillation with no recurrence, who presents to me for preoperative risk stratification for his hip surgery but also secondary to his severe AS.  He does have severe aortic valve stenosis with a mean gradient that is borderline.  His DOI is 0.2.  He has a preserved ejection fraction along with left ventricular cavity size.  He is asymptomatic.    1.  Severe degenerative aortic valve stenosis:  Asymptomatic.       I do think that this is asymptomatic as he has been active and has been doing well.       His left ventricular size and systolic function are normal.         He has no surgical indication at this time.       I do think that getting an ALIGN prior to his procedure and maintaining his blood pressure at the time of induction are going to be important and I would discuss this with Blayne Nicole MD who will be doing his anesthesia tomorrow.       I would not recommend any cardiac intervention prior to.       I will see him back in 3 months and we will reassess his echocardiogram in 6 months.  I think it is likely that he is going to need a valve replacement in the next 1-2 years but he does not right now.    2.  Hyperlipidemia:  Continue atorvastatin therapy.       Transaminases have been reviewed  recently and look to be within normal limits.

## 2020-12-15 ENCOUNTER — APPOINTMENT (OUTPATIENT)
Dept: GENERAL RADIOLOGY | Facility: HOSPITAL | Age: 70
End: 2020-12-15

## 2020-12-15 ENCOUNTER — HOSPITAL ENCOUNTER (INPATIENT)
Facility: HOSPITAL | Age: 70
LOS: 1 days | Discharge: HOME HEALTH | End: 2020-12-16
Attending: ORTHOPAEDIC SURGERY | Admitting: ORTHOPAEDIC SURGERY

## 2020-12-15 ENCOUNTER — ANESTHESIA (OUTPATIENT)
Dept: PERIOP | Facility: HOSPITAL | Age: 70
End: 2020-12-15

## 2020-12-15 ENCOUNTER — ANESTHESIA EVENT (OUTPATIENT)
Dept: PERIOP | Facility: HOSPITAL | Age: 70
End: 2020-12-15

## 2020-12-15 DIAGNOSIS — Z96.649 S/P REVISION OF TOTAL HIP: Primary | ICD-10-CM

## 2020-12-15 DIAGNOSIS — T84.51XA INFECTION OF RIGHT PROSTHETIC HIP JOINT (HCC): ICD-10-CM

## 2020-12-15 LAB
HCT VFR BLD AUTO: 36.7 % (ref 37.5–51)
HGB BLD-MCNC: 12.7 G/DL (ref 13–17.7)

## 2020-12-15 PROCEDURE — C1776 JOINT DEVICE (IMPLANTABLE): HCPCS | Performed by: ORTHOPAEDIC SURGERY

## 2020-12-15 PROCEDURE — 88331 PATH CONSLTJ SURG 1 BLK 1SPC: CPT | Performed by: ORTHOPAEDIC SURGERY

## 2020-12-15 PROCEDURE — 0SR904A REPLACEMENT OF RIGHT HIP JOINT WITH CERAMIC ON POLYETHYLENE SYNTHETIC SUBSTITUTE, UNCEMENTED, OPEN APPROACH: ICD-10-PCS | Performed by: ORTHOPAEDIC SURGERY

## 2020-12-15 PROCEDURE — 87205 SMEAR GRAM STAIN: CPT | Performed by: ORTHOPAEDIC SURGERY

## 2020-12-15 PROCEDURE — 25010000002 PHENYLEPHRINE PER 1 ML: Performed by: NURSE ANESTHETIST, CERTIFIED REGISTERED

## 2020-12-15 PROCEDURE — 87075 CULTR BACTERIA EXCEPT BLOOD: CPT | Performed by: ORTHOPAEDIC SURGERY

## 2020-12-15 PROCEDURE — 25010000002 FENTANYL CITRATE (PF) 100 MCG/2ML SOLUTION: Performed by: NURSE ANESTHETIST, CERTIFIED REGISTERED

## 2020-12-15 PROCEDURE — 87015 SPECIMEN INFECT AGNT CONCNTJ: CPT | Performed by: ORTHOPAEDIC SURGERY

## 2020-12-15 PROCEDURE — 25010000002 PROPOFOL 10 MG/ML EMULSION: Performed by: NURSE ANESTHETIST, CERTIFIED REGISTERED

## 2020-12-15 PROCEDURE — 0SP90JZ REMOVAL OF SYNTHETIC SUBSTITUTE FROM RIGHT HIP JOINT, OPEN APPROACH: ICD-10-PCS | Performed by: ORTHOPAEDIC SURGERY

## 2020-12-15 PROCEDURE — 25010000003 CEFAZOLIN IN DEXTROSE 2-4 GM/100ML-% SOLUTION: Performed by: ORTHOPAEDIC SURGERY

## 2020-12-15 PROCEDURE — 25010000002 DEXAMETHASONE PER 1 MG: Performed by: NURSE ANESTHETIST, CERTIFIED REGISTERED

## 2020-12-15 PROCEDURE — 72170 X-RAY EXAM OF PELVIS: CPT

## 2020-12-15 PROCEDURE — 25010000002 SUCCINYLCHOLINE PER 20 MG: Performed by: NURSE ANESTHETIST, CERTIFIED REGISTERED

## 2020-12-15 PROCEDURE — 25010000002 FENTANYL CITRATE (PF) 100 MCG/2ML SOLUTION: Performed by: ANESTHESIOLOGY

## 2020-12-15 PROCEDURE — 25010000003 BUPIVACAINE LIPOSOME 1.3 % SUSPENSION 20 ML VIAL: Performed by: ORTHOPAEDIC SURGERY

## 2020-12-15 PROCEDURE — 88332 PATH CONSLTJ SURG EA ADD BLK: CPT | Performed by: ORTHOPAEDIC SURGERY

## 2020-12-15 PROCEDURE — 25010000002 MIDAZOLAM PER 1 MG: Performed by: ANESTHESIOLOGY

## 2020-12-15 PROCEDURE — C9290 INJ, BUPIVACAINE LIPOSOME: HCPCS | Performed by: ORTHOPAEDIC SURGERY

## 2020-12-15 PROCEDURE — 87070 CULTURE OTHR SPECIMN AEROBIC: CPT | Performed by: ORTHOPAEDIC SURGERY

## 2020-12-15 PROCEDURE — 85018 HEMOGLOBIN: CPT | Performed by: ORTHOPAEDIC SURGERY

## 2020-12-15 PROCEDURE — 88305 TISSUE EXAM BY PATHOLOGIST: CPT | Performed by: ORTHOPAEDIC SURGERY

## 2020-12-15 PROCEDURE — 85014 HEMATOCRIT: CPT | Performed by: ORTHOPAEDIC SURGERY

## 2020-12-15 DEVICE — IMPLANTABLE DEVICE
Type: IMPLANTABLE DEVICE | Site: HIP | Status: FUNCTIONAL
Brand: BIOLOX® DELTA OPTION

## 2020-12-15 DEVICE — KNOTLESS TISSUE CONTROL DEVICE, VIOLET UNIDIRECTIONAL (ANTIBACTERIAL) SYNTHETIC ABSORBABLE DEVICE
Type: IMPLANTABLE DEVICE | Site: HIP | Status: FUNCTIONAL
Brand: STRATAFIX

## 2020-12-15 DEVICE — IMPLANTABLE DEVICE
Type: IMPLANTABLE DEVICE | Site: HIP | Status: FUNCTIONAL
Brand: ACTIVE ARTICULATION HIP SYSTEM

## 2020-12-15 DEVICE — VIOLET ANTIBACTERIAL POLYDIOXANONE, KNOTLESS TISSUE CONTROL DEVICE
Type: IMPLANTABLE DEVICE | Site: HIP | Status: FUNCTIONAL
Brand: STRATAFIX

## 2020-12-15 DEVICE — IMPLANTABLE DEVICE
Type: IMPLANTABLE DEVICE | Site: HIP | Status: FUNCTIONAL
Brand: BIOLOX® OPTION HEAD SYSTEM

## 2020-12-15 RX ORDER — OXYCODONE HYDROCHLORIDE AND ACETAMINOPHEN 5; 325 MG/1; MG/1
2 TABLET ORAL EVERY 4 HOURS PRN
Status: DISCONTINUED | OUTPATIENT
Start: 2020-12-15 | End: 2020-12-16 | Stop reason: HOSPADM

## 2020-12-15 RX ORDER — ATORVASTATIN CALCIUM 20 MG/1
20 TABLET, FILM COATED ORAL NIGHTLY
Status: DISCONTINUED | OUTPATIENT
Start: 2020-12-15 | End: 2020-12-16 | Stop reason: HOSPADM

## 2020-12-15 RX ORDER — CEFAZOLIN SODIUM 2 G/100ML
2 INJECTION, SOLUTION INTRAVENOUS ONCE
Status: COMPLETED | OUTPATIENT
Start: 2020-12-15 | End: 2020-12-15

## 2020-12-15 RX ORDER — SODIUM CHLORIDE 9 MG/ML
100 INJECTION, SOLUTION INTRAVENOUS CONTINUOUS
Status: DISCONTINUED | OUTPATIENT
Start: 2020-12-15 | End: 2020-12-16 | Stop reason: HOSPADM

## 2020-12-15 RX ORDER — DIPHENHYDRAMINE HYDROCHLORIDE 50 MG/ML
12.5 INJECTION INTRAMUSCULAR; INTRAVENOUS
Status: DISCONTINUED | OUTPATIENT
Start: 2020-12-15 | End: 2020-12-15 | Stop reason: HOSPADM

## 2020-12-15 RX ORDER — LIDOCAINE HYDROCHLORIDE 20 MG/ML
INJECTION, SOLUTION INFILTRATION; PERINEURAL AS NEEDED
Status: DISCONTINUED | OUTPATIENT
Start: 2020-12-15 | End: 2020-12-15 | Stop reason: SURG

## 2020-12-15 RX ORDER — FENTANYL CITRATE 50 UG/ML
50 INJECTION, SOLUTION INTRAMUSCULAR; INTRAVENOUS
Status: DISCONTINUED | OUTPATIENT
Start: 2020-12-15 | End: 2020-12-15 | Stop reason: HOSPADM

## 2020-12-15 RX ORDER — CELECOXIB 200 MG/1
200 CAPSULE ORAL ONCE
Status: COMPLETED | OUTPATIENT
Start: 2020-12-15 | End: 2020-12-15

## 2020-12-15 RX ORDER — SODIUM CHLORIDE, SODIUM LACTATE, POTASSIUM CHLORIDE, CALCIUM CHLORIDE 600; 310; 30; 20 MG/100ML; MG/100ML; MG/100ML; MG/100ML
100 INJECTION, SOLUTION INTRAVENOUS CONTINUOUS
Status: DISCONTINUED | OUTPATIENT
Start: 2020-12-15 | End: 2020-12-16 | Stop reason: HOSPADM

## 2020-12-15 RX ORDER — OXYCODONE HYDROCHLORIDE 5 MG/1
5 TABLET ORAL ONCE
Status: COMPLETED | OUTPATIENT
Start: 2020-12-15 | End: 2020-12-15

## 2020-12-15 RX ORDER — FLUMAZENIL 0.1 MG/ML
0.2 INJECTION INTRAVENOUS AS NEEDED
Status: DISCONTINUED | OUTPATIENT
Start: 2020-12-15 | End: 2020-12-15 | Stop reason: HOSPADM

## 2020-12-15 RX ORDER — PROMETHAZINE HYDROCHLORIDE 25 MG/1
25 SUPPOSITORY RECTAL ONCE AS NEEDED
Status: DISCONTINUED | OUTPATIENT
Start: 2020-12-15 | End: 2020-12-15 | Stop reason: HOSPADM

## 2020-12-15 RX ORDER — LABETALOL HYDROCHLORIDE 5 MG/ML
5 INJECTION, SOLUTION INTRAVENOUS
Status: DISCONTINUED | OUTPATIENT
Start: 2020-12-15 | End: 2020-12-15 | Stop reason: HOSPADM

## 2020-12-15 RX ORDER — NALOXONE HCL 0.4 MG/ML
0.1 VIAL (ML) INJECTION
Status: DISCONTINUED | OUTPATIENT
Start: 2020-12-15 | End: 2020-12-16 | Stop reason: HOSPADM

## 2020-12-15 RX ORDER — TRANEXAMIC ACID 100 MG/ML
INJECTION, SOLUTION INTRAVENOUS AS NEEDED
Status: DISCONTINUED | OUTPATIENT
Start: 2020-12-15 | End: 2020-12-15 | Stop reason: SURG

## 2020-12-15 RX ORDER — SODIUM CHLORIDE 0.9 % (FLUSH) 0.9 %
3-10 SYRINGE (ML) INJECTION AS NEEDED
Status: DISCONTINUED | OUTPATIENT
Start: 2020-12-15 | End: 2020-12-15 | Stop reason: HOSPADM

## 2020-12-15 RX ORDER — SUCCINYLCHOLINE CHLORIDE 20 MG/ML
INJECTION INTRAMUSCULAR; INTRAVENOUS AS NEEDED
Status: DISCONTINUED | OUTPATIENT
Start: 2020-12-15 | End: 2020-12-15 | Stop reason: SURG

## 2020-12-15 RX ORDER — FENTANYL CITRATE 50 UG/ML
INJECTION, SOLUTION INTRAMUSCULAR; INTRAVENOUS AS NEEDED
Status: DISCONTINUED | OUTPATIENT
Start: 2020-12-15 | End: 2020-12-15 | Stop reason: SURG

## 2020-12-15 RX ORDER — FAMOTIDINE 10 MG/ML
20 INJECTION, SOLUTION INTRAVENOUS ONCE
Status: COMPLETED | OUTPATIENT
Start: 2020-12-15 | End: 2020-12-15

## 2020-12-15 RX ORDER — CEFAZOLIN SODIUM 2 G/100ML
2 INJECTION, SOLUTION INTRAVENOUS EVERY 8 HOURS
Status: COMPLETED | OUTPATIENT
Start: 2020-12-15 | End: 2020-12-16

## 2020-12-15 RX ORDER — PROPOFOL 10 MG/ML
VIAL (ML) INTRAVENOUS AS NEEDED
Status: DISCONTINUED | OUTPATIENT
Start: 2020-12-15 | End: 2020-12-15 | Stop reason: SURG

## 2020-12-15 RX ORDER — OXYCODONE AND ACETAMINOPHEN 7.5; 325 MG/1; MG/1
1 TABLET ORAL ONCE AS NEEDED
Status: DISCONTINUED | OUTPATIENT
Start: 2020-12-15 | End: 2020-12-15 | Stop reason: HOSPADM

## 2020-12-15 RX ORDER — ONDANSETRON 4 MG/1
4 TABLET, FILM COATED ORAL EVERY 6 HOURS PRN
Status: DISCONTINUED | OUTPATIENT
Start: 2020-12-15 | End: 2020-12-16 | Stop reason: HOSPADM

## 2020-12-15 RX ORDER — ONDANSETRON 2 MG/ML
4 INJECTION INTRAMUSCULAR; INTRAVENOUS EVERY 6 HOURS PRN
Status: DISCONTINUED | OUTPATIENT
Start: 2020-12-15 | End: 2020-12-16 | Stop reason: HOSPADM

## 2020-12-15 RX ORDER — IPRATROPIUM BROMIDE AND ALBUTEROL SULFATE 2.5; .5 MG/3ML; MG/3ML
3 SOLUTION RESPIRATORY (INHALATION) ONCE AS NEEDED
Status: DISCONTINUED | OUTPATIENT
Start: 2020-12-15 | End: 2020-12-15 | Stop reason: HOSPADM

## 2020-12-15 RX ORDER — MIDAZOLAM HYDROCHLORIDE 1 MG/ML
1 INJECTION INTRAMUSCULAR; INTRAVENOUS
Status: DISCONTINUED | OUTPATIENT
Start: 2020-12-15 | End: 2020-12-15 | Stop reason: HOSPADM

## 2020-12-15 RX ORDER — SODIUM CHLORIDE 0.9 % (FLUSH) 0.9 %
3 SYRINGE (ML) INJECTION EVERY 12 HOURS SCHEDULED
Status: DISCONTINUED | OUTPATIENT
Start: 2020-12-15 | End: 2020-12-15 | Stop reason: HOSPADM

## 2020-12-15 RX ORDER — FAMOTIDINE 20 MG/1
40 TABLET, FILM COATED ORAL DAILY
Status: DISCONTINUED | OUTPATIENT
Start: 2020-12-15 | End: 2020-12-16 | Stop reason: HOSPADM

## 2020-12-15 RX ORDER — SODIUM CHLORIDE, SODIUM LACTATE, POTASSIUM CHLORIDE, CALCIUM CHLORIDE 600; 310; 30; 20 MG/100ML; MG/100ML; MG/100ML; MG/100ML
9 INJECTION, SOLUTION INTRAVENOUS CONTINUOUS
Status: DISCONTINUED | OUTPATIENT
Start: 2020-12-15 | End: 2020-12-16 | Stop reason: HOSPADM

## 2020-12-15 RX ORDER — ACETAMINOPHEN 500 MG
1000 TABLET ORAL ONCE
Status: COMPLETED | OUTPATIENT
Start: 2020-12-15 | End: 2020-12-15

## 2020-12-15 RX ORDER — DEXAMETHASONE SODIUM PHOSPHATE 10 MG/ML
INJECTION INTRAMUSCULAR; INTRAVENOUS AS NEEDED
Status: DISCONTINUED | OUTPATIENT
Start: 2020-12-15 | End: 2020-12-15 | Stop reason: SURG

## 2020-12-15 RX ORDER — NALOXONE HCL 0.4 MG/ML
0.2 VIAL (ML) INJECTION AS NEEDED
Status: DISCONTINUED | OUTPATIENT
Start: 2020-12-15 | End: 2020-12-15 | Stop reason: HOSPADM

## 2020-12-15 RX ORDER — ONDANSETRON 2 MG/ML
4 INJECTION INTRAMUSCULAR; INTRAVENOUS ONCE AS NEEDED
Status: DISCONTINUED | OUTPATIENT
Start: 2020-12-15 | End: 2020-12-15 | Stop reason: HOSPADM

## 2020-12-15 RX ORDER — PROMETHAZINE HYDROCHLORIDE 25 MG/1
25 TABLET ORAL ONCE AS NEEDED
Status: DISCONTINUED | OUTPATIENT
Start: 2020-12-15 | End: 2020-12-15 | Stop reason: HOSPADM

## 2020-12-15 RX ORDER — DIPHENHYDRAMINE HCL 25 MG
25 CAPSULE ORAL
Status: DISCONTINUED | OUTPATIENT
Start: 2020-12-15 | End: 2020-12-15 | Stop reason: HOSPADM

## 2020-12-15 RX ORDER — HYDROCODONE BITARTRATE AND ACETAMINOPHEN 7.5; 325 MG/1; MG/1
1 TABLET ORAL ONCE AS NEEDED
Status: DISCONTINUED | OUTPATIENT
Start: 2020-12-15 | End: 2020-12-15 | Stop reason: HOSPADM

## 2020-12-15 RX ORDER — HYDROMORPHONE HYDROCHLORIDE 1 MG/ML
0.5 INJECTION, SOLUTION INTRAMUSCULAR; INTRAVENOUS; SUBCUTANEOUS
Status: DISCONTINUED | OUTPATIENT
Start: 2020-12-15 | End: 2020-12-15 | Stop reason: HOSPADM

## 2020-12-15 RX ORDER — ROCURONIUM BROMIDE 10 MG/ML
INJECTION, SOLUTION INTRAVENOUS AS NEEDED
Status: DISCONTINUED | OUTPATIENT
Start: 2020-12-15 | End: 2020-12-15 | Stop reason: SURG

## 2020-12-15 RX ORDER — LIDOCAINE HYDROCHLORIDE 10 MG/ML
0.5 INJECTION, SOLUTION EPIDURAL; INFILTRATION; INTRACAUDAL; PERINEURAL ONCE AS NEEDED
Status: DISCONTINUED | OUTPATIENT
Start: 2020-12-15 | End: 2020-12-15 | Stop reason: HOSPADM

## 2020-12-15 RX ORDER — OXYCODONE HYDROCHLORIDE AND ACETAMINOPHEN 5; 325 MG/1; MG/1
1 TABLET ORAL EVERY 4 HOURS PRN
Status: DISCONTINUED | OUTPATIENT
Start: 2020-12-15 | End: 2020-12-16 | Stop reason: HOSPADM

## 2020-12-15 RX ADMIN — FAMOTIDINE 40 MG: 20 TABLET, FILM COATED ORAL at 20:23

## 2020-12-15 RX ADMIN — FENTANYL CITRATE 25 MCG: 50 INJECTION INTRAMUSCULAR; INTRAVENOUS at 13:05

## 2020-12-15 RX ADMIN — CELECOXIB 200 MG: 200 CAPSULE ORAL at 10:46

## 2020-12-15 RX ADMIN — SUCCINYLCHOLINE CHLORIDE 100 MG: 20 INJECTION, SOLUTION INTRAMUSCULAR; INTRAVENOUS; PARENTERAL at 13:01

## 2020-12-15 RX ADMIN — FAMOTIDINE 20 MG: 10 INJECTION INTRAVENOUS at 10:46

## 2020-12-15 RX ADMIN — OXYCODONE HYDROCHLORIDE 5 MG: 5 TABLET ORAL at 10:45

## 2020-12-15 RX ADMIN — CEFAZOLIN SODIUM 2 G: 2 INJECTION, SOLUTION INTRAVENOUS at 20:23

## 2020-12-15 RX ADMIN — MIDAZOLAM 2 MG: 1 INJECTION INTRAMUSCULAR; INTRAVENOUS at 11:21

## 2020-12-15 RX ADMIN — SODIUM CHLORIDE 100 ML/HR: 9 INJECTION, SOLUTION INTRAVENOUS at 16:33

## 2020-12-15 RX ADMIN — PROPOFOL 80 MG: 10 INJECTION, EMULSION INTRAVENOUS at 13:00

## 2020-12-15 RX ADMIN — ROCURONIUM BROMIDE 10 MG: 10 INJECTION INTRAVENOUS at 13:00

## 2020-12-15 RX ADMIN — LIDOCAINE HYDROCHLORIDE 50 MG: 20 INJECTION, SOLUTION INFILTRATION; PERINEURAL at 13:00

## 2020-12-15 RX ADMIN — SODIUM CHLORIDE, POTASSIUM CHLORIDE, SODIUM LACTATE AND CALCIUM CHLORIDE 9 ML/HR: 600; 310; 30; 20 INJECTION, SOLUTION INTRAVENOUS at 10:48

## 2020-12-15 RX ADMIN — DEXAMETHASONE SODIUM PHOSPHATE 6 MG: 10 INJECTION INTRAMUSCULAR; INTRAVENOUS at 13:09

## 2020-12-15 RX ADMIN — PHENYLEPHRINE HYDROCHLORIDE 0.25 MCG/KG/MIN: 10 INJECTION, SOLUTION INTRAMUSCULAR; INTRAVENOUS; SUBCUTANEOUS at 12:59

## 2020-12-15 RX ADMIN — TRANEXAMIC ACID 1000 MG: 100 INJECTION, SOLUTION INTRAVENOUS at 13:05

## 2020-12-15 RX ADMIN — ATORVASTATIN CALCIUM 20 MG: 20 TABLET, FILM COATED ORAL at 20:23

## 2020-12-15 RX ADMIN — ACETAMINOPHEN 1000 MG: 500 TABLET ORAL at 10:45

## 2020-12-15 RX ADMIN — FENTANYL CITRATE 25 MCG: 50 INJECTION INTRAMUSCULAR; INTRAVENOUS at 13:57

## 2020-12-15 RX ADMIN — FENTANYL CITRATE 50 MCG: 50 INJECTION, SOLUTION INTRAMUSCULAR; INTRAVENOUS at 11:21

## 2020-12-15 RX ADMIN — CEFAZOLIN SODIUM 2 G: 2 INJECTION, SOLUTION INTRAVENOUS at 13:08

## 2020-12-15 RX ADMIN — FENTANYL CITRATE 50 MCG: 50 INJECTION INTRAMUSCULAR; INTRAVENOUS at 14:01

## 2020-12-15 NOTE — ANESTHESIA PROCEDURE NOTES
Arterial Line    Pre-sedation assessment completed: 12/15/2020 11:20 AM    Patient reassessed immediately prior to procedure    Patient location during procedure: holding area  Start time: 12/15/2020 11:21 AM  Stop Time:12/15/2020 11:24 AM       Line placed for hemodynamic monitoring and ABGs/Labs/ISTAT.  Performed By   Anesthesiologist: Julian Jiménez MD  Preanesthetic Checklist  Completed: patient identified, site marked, surgical consent, pre-op evaluation, timeout performed, IV checked, risks and benefits discussed and monitors and equipment checked  Arterial Line Prep   Sterile Tech: gloves, mask, cap and sterile barriers  Prep: ChloraPrep  Patient monitoring: blood pressure monitoring, continuous pulse oximetry and EKG  Arterial Line Procedure   Laterality:right  Location:  radial artery  Catheter size: 20 G   Guidance: ultrasound guided  PROCEDURE NOTE/ULTRASOUND INTERPRETATION.  Using ultrasound guidance the potential vascular sites for insertion of the catheter were visualized to determine the patency of the vessel to be used for vascular access.  After selecting the appropriate site for insertion, the needle was visualized under ultrasound being inserted into the radial artery, followed by ultrasound confirmation of wire and catheter placement. There were no abnormalities seen on ultrasound; an image was taken; and the patient tolerated the procedure with no complications.   Number of attempts: 1  Successful placement: yes  Post Assessment   Dressing Type: occlusive dressing applied, secured with tape and wrist guard applied.   Complications no  Circ/Move/Sens Assessment: normal and unchanged.   Patient Tolerance: patient tolerated the procedure well with no apparent complications

## 2020-12-15 NOTE — ANESTHESIA POSTPROCEDURE EVALUATION
"Patient: Hieu Mann    Procedure Summary     Date: 12/15/20 Room / Location: [facility] OR [address] [place] [address] [facility] OR    Anesthesia Start: 1245 Anesthesia Stop: 1411    Procedure: RT. TOTAL HIP ARTHROPLASTY (Right Hip) Diagnosis:     Surgeon: David Stein II, MD Provider: Julian Jiménez MD    Anesthesia Type: general ASA Status: 4          Anesthesia Type: general    Vitals  Vitals Value Taken Time   /88 12/15/20 1620   Temp 36.4 °C (97.5 °F) 12/15/20 1405   Pulse 52 12/15/20 1550   Resp 16 12/15/20 1550   SpO2 98 % 12/15/20 1637   Vitals shown include unvalidated device data.        Post Anesthesia Care and Evaluation    Patient location during evaluation: PACU  Patient participation: complete - patient participated  Level of consciousness: awake and alert  Pain management: adequate  Airway patency: patent  Anesthetic complications: No anesthetic complications    Cardiovascular status: acceptable  Respiratory status: acceptable  Hydration status: acceptable    Comments: /64   Pulse 52   Temp 36.4 °C (97.5 °F) (Oral)   Resp 16   Ht 172.7 cm (68\")   Wt 66.7 kg (147 lb)   SpO2 99%   BMI 22.35 kg/m²         "

## 2020-12-15 NOTE — ANESTHESIA PREPROCEDURE EVALUATION
Anesthesia Evaluation     Patient summary reviewed and Nursing notes reviewed   no history of anesthetic complications:               Airway   Mallampati: II  TM distance: >3 FB  Neck ROM: full  no difficulty expected  Dental - normal exam     Pulmonary - negative pulmonary ROS    breath sounds clear to auscultation  (-) shortness of breath, sleep apnea, decreased breath sounds, wheezes  Cardiovascular   Exercise tolerance: good (4-7 METS)    Rhythm: regular  Rate: normal    (+) valvular problems/murmurs (severe - no interventions at this time. Clear per Dr Quintana) AS, murmur, hyperlipidemia,   (-) past MI, angina, CHF, orthopnea, PND, ROSS, PVD      Neuro/Psych- negative ROS  (-) seizures, neuromuscular disease, TIA, CVA, dizziness/light headedness, weakness, numbness  GI/Hepatic/Renal/Endo - negative ROS   (-) liver disease, diabetes    Musculoskeletal     Abdominal  - normal exam   Substance History - negative use  (-) alcohol use, drug use     OB/GYN negative ob/gyn ROS         Other   arthritis,                      Anesthesia Plan    ASA 4     general   (I discussed with the patient the relevant risks of general anesthesia including, but not limited to, nausea, vomiting, disorientation, post-op delirium, nerve injury, oral/dental injury, awareness, stroke, and death.  I also reviewed any clinically relevant lab and imaging results.    A-line)  intravenous induction     Anesthetic plan, all risks, benefits, and alternatives have been provided, discussed and informed consent has been obtained with: patient.    Plan discussed with CRNA.

## 2020-12-15 NOTE — OP NOTE
Total Hip Revision Operative Note  Dr. KAITLIN Stein II  [phone]    PATIENT NAME: Hieu Mann  MRN: [id number]  : 1950 AGE: 70 y.o. GENDER: male  DATE OF OPERATION: 12/15/2020  PREOPERATIVE DIAGNOSIS: Right hip gross metallosis  POSTOPERATIVE DIAGNOSIS: Same  OPERATION PERFORMED: Right Total Hip Revision  SURGEON: David Stein MD  Circulator: Ana Posada RN  Scrub Person: Trinity Weir  Vendor Representative: Jos Marquez  Assistant: Nahomy Austin PA  Orientee: Daniel Dwyer RN  ANESTHESIA: General  ASSISTANT: Nahomy Austin. This case would not have been possible without another set of skilled surgical hands for retraction, use of instrumentation, and general assistance.  This assistance was vital to the success of the case.   ESTIMATED BLOOD LOSS: 100cc  SPONGE AND NEEDLE COUNT: Correct  INDICATIONS: This patient was noted to have a large pseudotumor with gross metallosis A discussion of operative versus nonoperative treatment was had with the patient. They elected to undergo revision hip arthroplasty. The risks of surgery were discussed and included the risk of anesthesia, infection, damage to neurovascular structures, implant loosening/failure, fracture, hardware prominence, dislocation, the need for further procedures, medical complications, and others. No guarantees were made. The patient wished to proceed with surgery and a surgical consent was signed.  COMPONENTS:   · Biomet active articulating hip system dual mobility bearing 50 mm outer diameter, 28 mm inner diameter polyethylene head ball with a Biolox delta 28 mm +3 ball and a 16/18 taper adapter    PERTINENT FINDINGS: Gross metallosis    DETAILS OF PROCEDURE:   The patient was met in the preoperative area. The site was marked. The consent and H&P were reviewed. The patient was then wheeled back to the operative suite and underwent anesthesia. The patient was positioned laterally using the pegboard.  An axillary  roll was placed under the down arm. Excess hair about the operative hip was removed using surgical clippers. Surgical alcohol was used to thoroughly clean the entire operative extremity.     The hip and leg were then prepped in the normal sterile fashion, which included ChloraPrep, multiple layers of sterile drapes, and surgical space suits for the entire operative team. New outer gloves were used by all sterile surgical team members after final draping. The surgical incision was marked. A surgical timeout was performed in which administration of preoperative antibiotics and tranexamic acid, if not contraindicated, was confirmed.    A standard posterior approach to the hip was then utilized. Retractors were then placed around the acetabulum and excess scar tissue was excised. The hip was then dislocated and the femoral head was removed using a bone tamp and mallet.     Gross metallosis was encountered with a yellow-brown liquid.  This was not consistent with pus.  Deep cultures were taken and tissues were sent for frozen, however there was a delay in getting the tissue sent because no one was available to take him to the lab.  By the time the tissues were sent, I was ready to close the wound and decided that given this patient's severe cardiac condition it would not be prudent to wait under anesthesia for the pathology slides especially given his preoperative normal CRP and ESR.    I was able to dislocate the hip and was easily able to remove the femoral head ball.  There was some osteolysis around the proximal femur but I did screw in the extractor and the femoral stem was noted to be quite stable and did not come out with malleting.  The acetabulum was also inspected and the cup was noted to be well fixated and there was not too much wear of the metallic cup itself.  A real head ball with an additional 3 mm was open.  This is a dual mobility system.  The prior hip had been quite stable and was actually difficult  "to dislocate and I thought that adding 3 more millimeters given the revision surgery would be sufficient.  This was assembled on the back table while the hip was thoroughly irrigated using a pulse lavage and 3 L of saline and Betadine.  The head ball was then secured onto the trunnion which was noted to be in good condition.  The hip was relocated and closed in layers.  An analgesic cocktail was injected around the soft tissues.  A sterile dressing was applied.    The patient was moved from the operative table to the bed. The patient was taken to the recovery room in stable condition. There were no complications and the patient tolerated the procedure well.    R \"Junaid\" Emil ESCOTO MD  Orthopaedic Surgery  [facility]  [phone]                    "

## 2020-12-15 NOTE — NURSING NOTE
Spoke with Valentin with [facility], Dr. Azul requesting patient go to telemetry bed due to his severe aortic valve stenosis. Patient is stable; heart rate sinus rhythm/sinus katheryn.

## 2020-12-15 NOTE — H&P
Orthopaedic Surgery  History & Physical  Dr. KAITLIN Stein II  [phone]    HPI:  Patient is a 70 y.o. Not  or  male who presents with a right thigh mass.  The patient underwent full evaluation and was found to have a large distended capsule of his right hip that is presumed to be a result of metallosis from his metal-on-metal articulation.  He was indicated for revision surgery and is here today to have that performed.  Preoperative work-up included a CRP and ESR that was not concerning for infection and the patient was not indicated for an aspiration.  This patient has a very serious cardiac history and it has been made quite aware to us that he will be at extreme risk of complication with any intraoperative hypotension.    MEDICAL HISTORY  Past Medical History:   Diagnosis Date   • Aortic valve stenosis    • Arthritis    • Elevated cholesterol    • Epigastric hernia    • Heart murmur     NO TREATMENT   • History of irregular heartbeat     A-FIB   • History of [place] spotted fever    • Hyperlipidemia    • Swelling     RIGHT HIP AREA   ·   Past Surgical History:   Procedure Laterality Date   • CATARACT EXTRACTION WITH INTRAOCULAR LENS IMPLANT Bilateral    • COLONOSCOPY N/A 2014    DR. HONEYCUTT   • EPIGASTRIC HERNIA REPAIR N/A 3/9/2018    Procedure: EPIGASTRIC HERNIA REPAIR;  Surgeon: David Finn MD;  Location: [facility];  Service: General   • LAPAROSCOPIC INGUINAL HERNIA REPAIR Left 03/27/2015    Dr. Hieu Khan   • REPLACEMENT TOTAL KNEE Left 08/06/2012    Dr. David Stein   • REPLACEMENT TOTAL KNEE Right 11/12/2003    Right total knee replacement, cementless Hannah Profix-Dr. David Stein   • TOTAL HIP ARTHROPLASTY Right 05/11/2009    Dr. David Stein   • TOTAL HIP ARTHROPLASTY Left 02/24/2011    Dr. David Stein   ·   Prior to Admission medications    Medication Sig Start Date End Date Taking? Authorizing Provider   atorvastatin (LIPITOR) 20 MG tablet Take 20 mg by mouth  Every Night.    Provider, MD Bekah   Chlorhexidine Gluconate 2 % pads Apply  topically. AS DIRECTED PAT    Bekah Daly MD   Cholecalciferol (VITAMIN D3 PO) Take 3 tablets by mouth Daily. HOLD FOR SURGERY    Bekah Daly MD   Multiple Vitamins-Minerals (CENTRUM SILVER ADULT 50+ PO) Take 1 tablet by mouth Daily. HOLD FOR SURGERY    Bekah Daly MD   mupirocin (BACTROBAN) 2 % ointment Apply  topically to the appropriate area as directed 3 (Three) Times a Day. AS DIRECTED PAT    Bekah Daly MD   sildenafil (VIAGRA) 25 MG tablet Take 25 mg by mouth Daily As Needed for Erectile Dysfunction. HOLD FOR SURGERY    Bekah Daly MD   ·   · No Known Allergies  ·   · There is no immunization history on file for this patient.  Social History     Tobacco Use   • Smoking status: Never Smoker   • Smokeless tobacco: Never Used   Substance Use Topics   • Alcohol use: Yes     Comment: ONE BEER ONCE MONTH   ·    Social History     Substance and Sexual Activity   Drug Use No   ·     REVIEW OF SYSTEMS:  · Head: negative for headache  · Respiratory: negative for shortness of breath.   · Cardiovascular: negative for chest pain.   · Gastrointestinal: negative abdominal pain.   · Neurological: negative for LOC  · Psychiatric/Behavioral: negative for memory loss.   · All other systems reviewed and are negative    VITALS: There were no vitals taken for this visit. There is no height or weight on file to calculate BMI.    PHYSICAL EXAM:   · CONSTITUTIONAL: A&Ox3, No acute distress  · LUNGS: Equal chest rise, no shortness of air  · CARDIOVASCULAR: palpable peripheral pulses  · SKIN: no skin lesions in the area examined  · LYMPH: no lymphadenopathy in the area examined  · EXTREMITY: Right Lower Extremity    · Gross Deformity: Thigh fullness with palpable fluctuance  · Pulses:  Brisk Capillary Refill  · Sensation: Intact to Saphenous, Sural, Deep Peroneal, Superficial Peroneal, and Tibial Nerves  "and grossly throughout extremity  · Motor: 5/5 EHL/FHL/TA/GS motor complexes    RADIOLOGY REVIEW:   Xr Chest Pa & Lateral    Result Date: 12/11/2020  Negative chest x-ray. Bilateral total hip arthroplasty. No periarticular osteolysis is appreciated around the right hip.  This report was finalized on 12/11/2020 5:10 PM by Dr. Petr Gupta M.D.      Xr Hip With Or Without Pelvis 2 - 3 View Right    Result Date: 12/11/2020  Negative chest x-ray. Bilateral total hip arthroplasty. No periarticular osteolysis is appreciated around the right hip.  This report was finalized on 12/11/2020 5:10 PM by Dr. Petr Gupta M.D.        LABS:   Results for the past 24 hours: No results found for this or any previous visit (from the past 24 hour(s)).    IMPRESSION:  Patient is a 70 y.o. 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  This patient has a very significant cardiac history and if he becomes hypotensive this could be severely problematic with anesthesia and keeping this patient safe.  The plan will be for minimal revision surgery if possible to remove the metal-on-metal articulation.  All efforts will be made to retain both the acetabular and femoral components in an effort to keep patient safe while under anesthesia.    R \"Junaid\" Emil ESCOTO MD  Orthopaedic Surgery  [facility]  [phone] - [place] Office  [phone] - [place] Office    "

## 2020-12-15 NOTE — ANESTHESIA PROCEDURE NOTES
Airway  Urgency: elective    Date/Time: 12/15/2020 1:02 PM  Airway not difficult    General Information and Staff    Patient location during procedure: OR  Anesthesiologist: Julian Jiménez MD  CRNA: Stefania Vo CRNA    Indications and Patient Condition  Indications for airway management: airway protection    Preoxygenated: yes  MILS maintained throughout  Mask difficulty assessment: 2 - vent by mask + OA or adjuvant +/- NMBA    Final Airway Details  Final airway type: endotracheal airway      Successful airway: ETT  Cuffed: yes   Successful intubation technique: direct laryngoscopy  Facilitating devices/methods: anterior pressure/BURP  Endotracheal tube insertion site: oral  Blade: Steven  Blade size: 4  ETT size (mm): 7.5  Cormack-Lehane Classification: grade IIa - partial view of glottis  Placement verified by: chest auscultation and capnometry   Cuff volume (mL): 7  Measured from: lips  ETT/EBT  to lips (cm): 21  Number of attempts at approach: 1  Assessment: lips, teeth, and gum same as pre-op and atraumatic intubation    Additional Comments  Atraumatic ET Tube placement.  Teeth as pre-op. BLEBS.  -ABD sounds.  +ET CO2.  Secured to face

## 2020-12-15 NOTE — PERIOPERATIVE NURSING NOTE
Notified Myrna, pt's wife - that pt has gone to holding for an arterial line and the nurse in holding will call her when pt goes to surgery.

## 2020-12-16 VITALS
HEART RATE: 78 BPM | OXYGEN SATURATION: 99 % | BODY MASS INDEX: 22.28 KG/M2 | RESPIRATION RATE: 16 BRPM | WEIGHT: 147 LBS | HEIGHT: 68 IN | TEMPERATURE: 98.3 F | SYSTOLIC BLOOD PRESSURE: 119 MMHG | DIASTOLIC BLOOD PRESSURE: 59 MMHG

## 2020-12-16 LAB
ANION GAP SERPL CALCULATED.3IONS-SCNC: 8.8 MMOL/L (ref 5–15)
BUN SERPL-MCNC: 10 MG/DL (ref 8–23)
BUN/CREAT SERPL: 17.5 (ref 7–25)
CALCIUM SPEC-SCNC: 8.4 MG/DL (ref 8.6–10.5)
CHLORIDE SERPL-SCNC: 106 MMOL/L (ref 98–107)
CO2 SERPL-SCNC: 23.2 MMOL/L (ref 22–29)
CREAT SERPL-MCNC: 0.57 MG/DL (ref 0.76–1.27)
GFR SERPL CREATININE-BSD FRML MDRD: 141 ML/MIN/1.73
GLUCOSE SERPL-MCNC: 128 MG/DL (ref 65–99)
HCT VFR BLD AUTO: 35.5 % (ref 37.5–51)
HGB BLD-MCNC: 12.1 G/DL (ref 13–17.7)
POTASSIUM SERPL-SCNC: 4.1 MMOL/L (ref 3.5–5.2)
SODIUM SERPL-SCNC: 138 MMOL/L (ref 136–145)

## 2020-12-16 PROCEDURE — 80048 BASIC METABOLIC PNL TOTAL CA: CPT | Performed by: ORTHOPAEDIC SURGERY

## 2020-12-16 PROCEDURE — 85018 HEMOGLOBIN: CPT | Performed by: ORTHOPAEDIC SURGERY

## 2020-12-16 PROCEDURE — 25010000003 CEFAZOLIN IN DEXTROSE 2-4 GM/100ML-% SOLUTION: Performed by: ORTHOPAEDIC SURGERY

## 2020-12-16 PROCEDURE — 97161 PT EVAL LOW COMPLEX 20 MIN: CPT

## 2020-12-16 PROCEDURE — 97110 THERAPEUTIC EXERCISES: CPT

## 2020-12-16 PROCEDURE — 85014 HEMATOCRIT: CPT | Performed by: ORTHOPAEDIC SURGERY

## 2020-12-16 RX ORDER — OXYCODONE HYDROCHLORIDE AND ACETAMINOPHEN 5; 325 MG/1; MG/1
1 TABLET ORAL EVERY 4 HOURS PRN
Qty: 30 TABLET | Refills: 0 | Status: SHIPPED | OUTPATIENT
Start: 2020-12-16 | End: 2020-12-22

## 2020-12-16 RX ORDER — ASPIRIN 325 MG
325 TABLET ORAL DAILY
Status: DISCONTINUED | OUTPATIENT
Start: 2020-12-16 | End: 2020-12-16 | Stop reason: HOSPADM

## 2020-12-16 RX ORDER — ASPIRIN 325 MG
325 TABLET, DELAYED RELEASE (ENTERIC COATED) ORAL DAILY
Qty: 30 TABLET | Refills: 0 | Status: SHIPPED | OUTPATIENT
Start: 2020-12-16 | End: 2021-03-15

## 2020-12-16 RX ADMIN — CEFAZOLIN SODIUM 2 G: 2 INJECTION, SOLUTION INTRAVENOUS at 05:25

## 2020-12-16 RX ADMIN — FAMOTIDINE 40 MG: 20 TABLET, FILM COATED ORAL at 09:05

## 2020-12-16 RX ADMIN — ASPIRIN 325 MG: 325 TABLET ORAL at 09:09

## 2020-12-16 NOTE — PROGRESS NOTES
Discharge Planning Assessment  [facility]     Patient Name: Hieu Mann  MRN: [id number]  Today's Date: 12/16/2020    Admit Date: 12/15/2020    Discharge Needs Assessment     Row Name 12/16/20 0842       Living Environment    Lives With  spouse    Name(s) of Who Lives With Patient  Spouse  ( Myrna Mann  ( [phone])    Current Living Arrangements  home/apartment/condo    Primary Care Provided by  self    Provides Primary Care For  no one    Family Caregiver if Needed  spouse    Family Caregiver Names  Spouse  ( Myrna Mann  ( [phone])    Quality of Family Relationships  involved;supportive    Able to Return to Prior Arrangements  yes    Living Arrangement Comments  Pt lives in single story house with spouse  ( Myrna Mann  ( [phone]).       Resource/Environmental Concerns    Resource/Environmental Concerns  none    Transportation Concerns  car, none       Transition Planning    Patient/Family Anticipates Transition to  home with family;home with help/services    Patient/Family Anticipated Services at Transition  home health care    Transportation Anticipated  family or friend will provide       Discharge Needs Assessment    Readmission Within the Last 30 Days  no previous admission in last 30 days    Equipment Currently Used at Home  grab bar    Concerns to be Addressed  denies needs/concerns at this time    Anticipated Changes Related to Illness  none    Equipment Needed After Discharge  grab bar, toilet;walker, rolling        Discharge Plan     Row Name 12/16/20 0845       Plan    Plan  Plan home with [facility] if accepted.  PAUL Haley RN    Provided Post Acute Provider List?  Yes    Post Acute Provider List  Home Health    Provided Post Acute Provider Quality & Resource List?  Refused    Patient/Family in Agreement with Plan  yes    Plan Comments  FACE SHEET VERIFIED/ IM LETTER SIGNED.  Spoke to pt at bedside. Pt's PCP is Dr. Clara Pallares.  Pt lives in single story house with  spouse  ( Myrna Mann  ( [phone]).    Pt is independent with ADLs.   Pt has a grab bar in  for home use.  Pt gets prescriptions at  [facility]  ([facility]).  Pt is not current with HH.   Pt provided a list of HH agencies and choose [facility].  [facility] ( [phone]) called to follow.  Pt states [facility] can provide DMEs that may be needed.  Pt states he has been in [facility] for skilled care.  Pt denies any other needs.  Plan home with [facility] if accepted.   PAUL Haley RN        Continued Care and Services - Admitted Since 12/15/2020     Home Medical Care     Service Provider Request Status Selected Services Address Phone Fax Patient Preferred    [facility]  Pending - Request Sent N/A [address] [address] [phone] --              Expected Discharge Date and Time     Expected Discharge Date Expected Discharge Time    Dec 16, 2020         Demographic Summary     Row Name 12/16/20 0841       General Information    Admission Type  inpatient    Arrived From  PACU/recovery room    Required Notices Provided  Important Message from Medicare    Referral Source  admission list    Reason for Consult  discharge planning    Preferred Language  English     Used During This Interaction  no        Functional Status     Row Name 12/16/20 0841       Functional Status    Usual Activity Tolerance  good    Current Activity Tolerance  good       Functional Status, IADL    Medications  independent    Meal Preparation  assistive person    Housekeeping  assistive person    Laundry  assistive person    Shopping  independent       Mental Status    General Appearance WDL  WDL       Mental Status Summary    Recent Changes in Mental Status/Cognitive Functioning  no changes        Psychosocial    No documentation.       Abuse/Neglect    No documentation.       Legal    No documentation.       Substance Abuse    No documentation.       Patient  Forms    No documentation.           Norma Haley RN

## 2020-12-16 NOTE — PROGRESS NOTES
Patient looks great.  No chest pain or dyspnea.  Appears euvolemic.  Appropriate for discharge.  I will see him back in 3 months.

## 2020-12-16 NOTE — PROGRESS NOTES
Case Management Discharge Note      Final Note: Pt discharged home with [facility] for Nursing and PT.   PAUL Haley RN    Provided Post Acute Provider List?: Yes  Post Acute Provider List: Home Health  Provided Post Acute Provider Quality & Resource List?: Refused    Selected Continued Care - Admitted Since 12/15/2020     Destination    No services have been selected for the patient.              Durable Medical Equipment     Service Provider Selected Services Address Phone Fax Patient Preferred    [facility]  Durable Medical Equipment [address] [address] [phone] --          Dialysis/Infusion    No services have been selected for the patient.              Home Medical Care Coordination complete    Service Provider Selected Services Address Phone Fax Patient Preferred    [facility] [place]  Home Health Services [address] [address] [phone] --          Therapy    No services have been selected for the patient.              Community Resources    No services have been selected for the patient.                  Transportation Services  Private: Car    Final Discharge Disposition Code: 06 - home with home health care

## 2020-12-16 NOTE — ADDENDUM NOTE
Addendum  created 12/16/20 0921 by Stefania Vo CRNA    Flowsheet accepted, Intraprocedure Flowsheets edited

## 2020-12-16 NOTE — PROGRESS NOTES
Continued Stay Note  [facility]     Patient Name: Hieu Mann  MRN: [id number]  Today's Date: 12/16/2020    Admit Date: 12/15/2020    Discharge Plan     Row Name 12/16/20 0935       Plan    Plan  Plan home with [facility] for Nursing and PT.   PAUL Haley RN    Patient/Family in Agreement with Plan  yes    Plan Comments  [facility]  ( [phone]) called to deliver pt's DMEs with [facility].  [facility]  ( [phone]) has accepted pt for [facility].   Plan home with [facility] for Nursing and PT.  PAUL Haley RN    Row Name 12/16/20 2057       Plan    Plan  Plan home with [facility] if accepted.  PAUL Haley RN    Provided Post Acute Provider List?  Yes    Post Acute Provider List  Home Health    Provided Post Acute Provider Quality & Resource List?  Refused    Patient/Family in Agreement with Plan  yes    Plan Comments  FACE SHEET VERIFIED/ IM LETTER SIGNED.  Spoke to pt at bedside. Pt's PCP is Dr. Clara Pallares.  Pt lives in single story house with spouse  ( Myrna Mann  ( [phone]).    Pt is independent with ADLs.   Pt has a grab bar in  for home use.  Pt gets prescriptions at  [facility]  ([facility]).  Pt is not current with HH.   Pt provided a list of HH agencies and choose [facility].  [facility] ( [phone]) called to follow.  Pt states [facility] can provide DMEs that may be needed.  Pt states he has been in [facility] for skilled care.  Pt denies any other needs.  Plan home with [facility] if accepted.   PAUL Haley RN        Discharge Codes    No documentation.       Expected Discharge Date and Time     Expected Discharge Date Expected Discharge Time    Dec 16, 2020             Norma Haley RN

## 2020-12-16 NOTE — PLAN OF CARE
Problem: Adult Inpatient Plan of Care  Goal: Plan of Care Review  Outcome: Met  Flowsheets (Taken 12/16/2020 1140)  Progress: improving  Plan of Care Reviewed With: patient  Outcome Summary: Pt tolerated PT this am. No c/o pain, no signs of distress  Goal: Patient-Specific Goal (Individualized)  Outcome: Met  Goal: Absence of Hospital-Acquired Illness or Injury  Outcome: Met  Intervention: Identify and Manage Fall Risk  Recent Flowsheet Documentation  Taken 12/16/2020 1005 by Amalia Parker RN  Safety Promotion/Fall Prevention:   activity supervised   assistive device/personal items within reach   clutter free environment maintained   fall prevention program maintained   nonskid shoes/slippers when out of bed   room organization consistent   safety round/check completed  Taken 12/16/2020 0820 by Amalia Parker RN  Safety Promotion/Fall Prevention:   activity supervised   assistive device/personal items within reach   clutter free environment maintained   fall prevention program maintained   nonskid shoes/slippers when out of bed   room organization consistent   safety round/check completed  Intervention: Prevent Skin Injury  Recent Flowsheet Documentation  Taken 12/16/2020 1005 by Amalia Parker RN  Body Position: position changed independently  Taken 12/16/2020 0820 by Amalia Parker RN  Body Position: position changed independently  Intervention: Prevent Infection  Recent Flowsheet Documentation  Taken 12/16/2020 1005 by Amalia Parker RN  Infection Prevention:   rest/sleep promoted   single patient room provided  Taken 12/16/2020 0820 by Amalia Parker RN  Infection Prevention:   single patient room provided   rest/sleep promoted  Goal: Optimal Comfort and Wellbeing  Outcome: Met  Intervention: Provide Person-Centered Care  Recent Flowsheet Documentation  Taken 12/16/2020 0820 by Amalia Parker RN  Trust Relationship/Rapport:   care explained   questions  answered   empathic listening provided  Goal: Readiness for Transition of Care  Outcome: Met   Goal Outcome Evaluation:  Plan of Care Reviewed With: patient  Progress: improving  Outcome Summary: Pt tolerated PT this am. No c/o pain, no signs of distress

## 2020-12-16 NOTE — THERAPY DISCHARGE NOTE
Patient Name: Hieu Mann  : 1950    MRN: [id number]                              Today's Date: 2020       Admit Date: 12/15/2020    Visit Dx:     ICD-10-CM ICD-9-CM   1. S/P revision of total hip  Z96.649 V43.64   2. Infection of right prosthetic hip joint (CMS/HCC)  T84.51XA 996.66     V43.64     Patient Active Problem List   Diagnosis   • Hernia, epigastric   • History of atrial fibrillation   • Aortic valve sclerosis   • Nonrheumatic aortic valve stenosis   • Hyperlipidemia LDL goal <100   • S/P revision of total hip     Past Medical History:   Diagnosis Date   • Aortic valve stenosis    • Arthritis    • Elevated cholesterol    • Epigastric hernia    • Heart murmur     NO TREATMENT   • History of irregular heartbeat     A-FIB   • History of [place] spotted fever    • Hyperlipidemia    • Swelling     RIGHT HIP AREA     Past Surgical History:   Procedure Laterality Date   • CATARACT EXTRACTION WITH INTRAOCULAR LENS IMPLANT Bilateral    • COLONOSCOPY N/A     DR. HONEYCUTT   • EPIGASTRIC HERNIA REPAIR N/A 3/9/2018    Procedure: EPIGASTRIC HERNIA REPAIR;  Surgeon: David Finn MD;  Location: [facility];  Service: General   • LAPAROSCOPIC INGUINAL HERNIA REPAIR Left 2015    Dr. Hieu Khan   • REPLACEMENT TOTAL KNEE Left 2012    Dr. David Stein   • REPLACEMENT TOTAL KNEE Right 2003    Right total knee replacement, cementless Hannah Profix-Dr. David Stein   • TOTAL HIP ARTHROPLASTY Right 2009    Dr. David Stein   • TOTAL HIP ARTHROPLASTY Left 2011    Dr. David Stein   • TOTAL HIP ARTHROPLASTY Right 12/15/2020    Procedure: RT. TOTAL HIP ARTHROPLASTY;  Surgeon: David Stein II, MD;  Location: [facility];  Service: Orthopedics;  Laterality: Right;     General Information     Row Name 20 0915          Physical Therapy Time and Intention    Document Type  evaluation;discharge evaluation/summary  -EJ     Mode of Treatment   physical therapy  -     Row Name 12/16/20 0915          General Information    Patient Profile Reviewed  yes  -EJ     Prior Level of Function  independent:;ADL's;all household mobility;community mobility  -EJ     Existing Precautions/Restrictions  hip, posterior;right  -EJ     Barriers to Rehab  none identified  -EJ     Row Name 12/16/20 0915          Living Environment    Lives With  spouse  -EJ     Row Name 12/16/20 0915          Home Main Entrance    Number of Stairs, Main Entrance  one  -EJ     Row Name 12/16/20 0915          Stairs Within Home, Primary    Number of Stairs, Within Home, Primary  none  -EJ     Row Name 12/16/20 0915          Cognition    Orientation Status (Cognition)  oriented x 4  -EJ     Row Name 12/16/20 0915          Safety Issues, Functional Mobility    Impairments Affecting Function (Mobility)  range of motion (ROM);strength  -EJ       User Key  (r) = Recorded By, (t) = Taken By, (c) = Cosigned By    Initials Name Provider Type    EJ Farida Landis, PT Physical Therapist        Mobility     Row Name 12/16/20 0915          Bed Mobility    Bed Mobility  supine-sit;sit-supine  -EJ     Supine-Sit [place] (Bed Mobility)  verbal cues;contact guard  -EJ     Sit-Supine [place] (Bed Mobility)  verbal cues;contact guard  -EJ     Assistive Device (Bed Mobility)  head of bed elevated;bed rails  -     Row Name 12/16/20 0915          Sit-Stand Transfer    Sit-Stand [place] (Transfers)  verbal cues;standby assist;contact guard  -EJ     Assistive Device (Sit-Stand Transfers)  walker, front-wheeled  -     Row Name 12/16/20 0915          Gait/Stairs (Locomotion)    [place] Level (Gait)  verbal cues;contact guard  -EJ     Assistive Device (Gait)  walker, front-wheeled  -EJ     Distance in Feet (Gait)  150  -EJ     Deviations/Abnormal Patterns (Gait)  juan decreased;stride length decreased  -EJ     Bilateral Gait Deviations  forward flexed posture  -EJ     Comment  (Gait/Stairs)  cues for sequencing and upright posture  -EJ       User Key  (r) = Recorded By, (t) = Taken By, (c) = Cosigned By    Initials Name Provider Type    Farida Abernathy, PT Physical Therapist        Obj/Interventions     Row Name 12/16/20 0916          Range of Motion Comprehensive    Comment, General Range of Motion  limited R hip  -     Row Name 12/16/20 0916          Strength Comprehensive (MMT)    Comment, General Manual Muscle Testing (MMT) Assessment  generalized post op weakness  -     Row Name 12/16/20 0916          Motor Skills    Therapeutic Exercise  -- R THR protocol x 10 reps  -EJ       User Key  (r) = Recorded By, (t) = Taken By, (c) = Cosigned By    Initials Name Provider Type    Farida Abernathy, PT Physical Therapist        Goals/Plan    No documentation.       Clinical Impression     [facility] Name 12/16/20 0917          Pain    Additional Documentation  Pain Scale: Numbers Pre/Post-Treatment (Group)  -[facility] [place] Name 12/16/20 0917          Pain Scale: Numbers Pre/Post-Treatment    Pretreatment Pain Rating  0/10 - no pain  -EJ     Posttreatment Pain Rating  0/10 - no pain  -[facility] [place] Name 12/16/20 0917          Plan of Care Review    Plan of Care Reviewed With  patient  -EJ     Progress  improving  -     Outcome Summary  Pt doing well and agreeable to PT this am. He is s/p R THR revision and presents with expected post op pain, weakness, and decreased mobility. Pt is independent at baseline and plans to DC home later today. Pt is moving quite well. He was able to tolerate all hip exercises and ambulate approx 150 ft with Rwx and CGA. No unsteadiness noted. RWx and BSC ordered for home. Pt with no further concerns at this time.  -     Row Name 12/16/20 0917          Therapy Assessment/Plan (PT)    Patient/Family Therapy Goals Statement (PT)  go home  -     Row Name 12/16/20 0917          Positioning and Restraints    Pre-Treatment Position  in bed  -EJ     Post Treatment  Position  bed  -EJ     In Bed  notified nsg;supine;call light within reach;encouraged to call for assist  -EJ       User Key  (r) = Recorded By, (t) = Taken By, (c) = Cosigned By    Initials Name Provider Type    Farida Abernathy, PT Physical Therapist        Outcome Measures     Row Name 12/16/20 0919          How much help from another person do you currently need...    Turning from your back to your side while in flat bed without using bedrails?  4  -EJ     Moving from lying on back to sitting on the side of a flat bed without bedrails?  3  -EJ     Moving to and from a bed to a chair (including a wheelchair)?  3  -EJ     Standing up from a chair using your arms (e.g., wheelchair, bedside chair)?  3  -EJ     Climbing 3-5 steps with a railing?  3  -EJ     To walk in hospital room?  3  -EJ     AM-PAC 6 Clicks Score (PT)  19  -EJ     Row Name 12/16/20 0919          Functional Assessment    Outcome Measure Options  AM-PAC 6 Clicks Basic Mobility (PT)  -EJ       User Key  (r) = Recorded By, (t) = Taken By, (c) = Cosigned By    Initials Name Provider Type    Farida Abernathy, PT Physical Therapist          PT Recommendation and Plan     Plan of Care Reviewed With: patient  Progress: improving  Outcome Summary: Pt doing well and agreeable to PT this am. He is s/p R THR revision and presents with expected post op pain, weakness, and decreased mobility. Pt is independent at baseline and plans to DC home later today. Pt is moving quite well. He was able to tolerate all hip exercises and ambulate approx 150 ft with Rwx and CGA. No unsteadiness noted. RWx and BSC ordered for home. Pt with no further concerns at this time.     Time Calculation:   PT Charges     Row Name 12/16/20 0922             Time Calculation    Start Time  0840  -EJ      Stop Time  0915  -EJ      Time Calculation (min)  35 min  -EJ      PT Received On  12/16/20  -EJ         Time Calculation- PT    Total Timed Code Minutes- PT  25 minute(s)  -EJ         User Key  (r) = Recorded By, (t) = Taken By, (c) = Cosigned By    Initials Name Provider Type    Farida Abernathy, PT Physical Therapist        Therapy Charges for Today     Code Description Service Date Service Provider Modifiers Qty    54601960964 HC PT EVAL LOW COMPLEXITY 2 12/16/2020 Farida Landis, PT GP 1    18247027683 HC PT THER PROC EA 15 MIN 12/16/2020 Farida Landis, PT GP 2          PT G-Codes  Outcome Measure Options: AM-PAC 6 Clicks Basic Mobility (PT)  AM-PAC 6 Clicks Score (PT): 19    PT Discharge Summary  Anticipated Discharge Disposition (PT): home with assist, home with home health  Reason for Discharge: Discharge from facility  Discharge Destination: Home with assist, Home with home health    Farida Landis, PT  12/16/2020

## 2020-12-16 NOTE — PROGRESS NOTES
Continued Stay Note  [facility]     Patient Name: Hieu Mann  MRN: [id number]  Today's Date: 12/16/2020    Admit Date: 12/15/2020    Discharge Plan     Row Name 12/16/20 1256       Plan    Plan  Plan home with [facility].   PAUL Haley RN    Plan Comments  Pt's walker has been delivered to room.  Pt's BSC will be delivered to pt's home.   Plan home with [facility].   PAUL Haley RN        Discharge Codes    No documentation.       Expected Discharge Date and Time     Expected Discharge Date Expected Discharge Time    Dec 16, 2020             Norma Haley, RN

## 2020-12-16 NOTE — DISCHARGE INSTRUCTIONS
Total Hip  Discharge Instructions  Dr. KAITLIN Ovalle” Candler II  [phone]    • INCISION CARE  o Wash your hands prior to dressing changes  o SONAM Wound VAC: Postoperatively you had a SONAM Wound Vac placed on the incision. This was placed under sterile conditions in the operating room. It remains in place for 7 days postoperatively. After 7 days, the entire dressing must be removed, including all of the sticky adhesive. The dressing and battery pack provide gentle suction to the incision and provide several benefits over a traditional dressing:  - It maintains the sterile environment of the OR and reduces the risk of infection  - The suction removes unwanted buildup of blood/hematoma under the skin to reduce swelling  - The suction also promotes fresh blood supply to the skin and soft tissue to speed up healing  - The postoperative scar is reduced in size  - Showering is permitted immediately after surgery, but the battery pack must be protected or removed during the shower.   o After 7 days the SONAM Wound Vac is removed. If there is no drainage, no dressing is required. If there is some scant drainage a dry bandage can be applied and changed daily until seen in the office or until the drainage stops.   o No creams or ointments to the incisions until 4 weeks post op.  o Do not touch or pick at the incision  o Check incision every day and notify surgeon immediately if any of the following signs or symptoms are seen:  - Increase in redness  - Increase in swelling around the incision and of the entire extremity  - Increase in pain  - NEW drainage or oozing from the incision  - Pulling apart of the edges of the incision  - Increase in overall body temperature (greater than 100.4 degrees)  o Zip-Line: your incision was closed with a state of the art device.   - Is a non-invasive and easy to use wound closure device that replaces sutures, staples and glue for surgical incisions  - It minimizes scarring and eliminating  “railroad” marks that come with staples or sutures  - It makes removal as atraumatic as peeling off a bandage  - Can be removed at home or by a physical therapist or nurse at 14 days postoperatively    • ACTIVITIES  o Exercises:  - Physical therapy will begin immediately while in the hospital. Patients going to a nursing home will get therapy as part of their care at the SNU/SNF facility. Patients going home may also have a therapist come to the house to help them mobilize until they can safely get to an outpatient therapy facility.  - Elevate the affected leg most of the day during the first week post operatively. Caution must be taken to avoid pillow placement directly under the heel of the leg, as this can cause pressure ulcers even with a soft pillow. All pillows and blankets should be placed underneath of the thigh and calf so that the heel is free-floating.  - Use cold packs for 20-30 minutes approximately 5 times per day.  - You should perform the daily stretching and strengthening exercises as taught by the therapist as often as possible. This can be done many times a day.  - Full weight bearing is allowed after surgery. It will be sore/painful to put weight on the leg, but this will help the bone to heal and prevent complications such as pneumonia, bed sores and blood clots. Mobilization is vital to the recovery process.  o Activities of Daily Living:  - No tub baths, hot tubs, or swimming pools for 4 weeks.  - May shower and let water run over the incision immediately after surgery. The battery pack of the SONAM Wound Vac must be protected or removed while in the shower. After the SONAM is removed 7 days after surgery showering is permitted as long as there is no drainage from the wound.     • Restrictions  o Weight: It is ok to allow full weight bearing after surgery. Weight on the leg actually quickens the recovery process. While it will be sore/painful to put weight on the leg, it is safe to do so. Hip  replacement after hip fracture has a much slower recover process. It can take months to heal fully from a hip fracture and patients even make some slow benefits up to a year afterwards.   o Driving: Many patients have questions about when it is safe to return to driving. The answer is that this is extremely variable. It depends on the extent of the surgery, as well as how quickly you heal. Certainly left leg surgeries make returning to driving easier while right leg surgeries require more extensive rehabilitation before driving can be safe. Until you can press down on the brake hard, and are off of all narcotics, driving is not permitted. Your surgeon cannot “clear” you to return to driving, only you can make the decision when you feel it is safe.    • Medications  o Anticoagulants: After upper extremity surgery most patients do not require an anticoagulant unless you have another injury that will be keeping you from mobilizing. Lower extremity surgery typically does require use of an anticoagulation medicine.   - IF YOU HAD LOWER EXTREMITY SURGERY AND ARE NOT DISCHARGED HOME WITH ANY ANTICOAGULANT MEDICINE YOU SHOULD TAKE ASPIRIN 325mg DAILY FOR 30 DAYS POSTOPERATIVELY.  - If you are discharged home with an anticoagulant such as Aspirin, Xarelto, Eliquis, Coumadin, or Lovenox, follow these simple instructions:   - Notify surgeon immediately if any alfreda bleeding is noted in the urine, stool, emesis, or from the nose or the incision. Blood in the stool will often appear as black rather than red. Blood in urine may appear as pink. Blood in emesis may appear as brown/black like coffee grounds.  - You will need to apply pressure for longer periods of time to any cuts or abrasions to stop bleeding  - Avoid alcohol while taking anticoagulants  - Most anticoagulants are to be taken for 30 days postoperatively. After this time, you may stop using them unless instructed otherwise.   - If you were already taking an  anticoagulant (commonly Aspirin, Coumadin, or Plavix) you will likely be resuming your normal dose postoperatively and will be continuing that medication at the discretion of the prescribing physician.  o Stool Softeners: You will be at greater risk of constipation after surgery due to being less mobile and the pain medications.  - Take stool softeners as needed. Over the counter Colace 100 mg 1-2 capsules twice daily can be taken.  - If stools become too loose or too frequent, please decreases the dosage or stop the stool softener.  - If constipation occurs despite use of stool softeners, you are to continue the stool softeners and add a laxative (Milk of Magnesia 1 ounce daily as needed)  - Drink plenty of fluids, and eat fruits and vegetables during your recovery time. Getting up and mobilizing will help the bowels to recover their regular function, as will weaning off of all narcotics when the pain becomes tolerable.  o Pain Medications: Utilized after surgery are narcotics. This is some general information about these medications.  - CLASSIFICATION: Pain medications are called Opioids and are narcotics  - LEGALITIES: It is illegal to share narcotics with others  - DRIVING: it is illegal to drive while under the influence of narcotics. Doing so is a DUI.  - POTENTIAL SIDE EFFECTS: nausea, vomiting, itching, dizziness, drowsiness, dry mouth, constipation, and difficulty urinating.  - POTENTIAL ADVERSE EFFECTS:  - Opioid tolerance can develop with use of pain medications and this simply means that it requires more and more of the medication to control pain. However, this is seen more in patients that use opioids for longer periods of time.  - Opioid dependence can develop with use of Opioids. People with opioid dependence will experience withdrawal symptoms upon cessation of the medication.  - Opioid addiction can develop with use of Opioids. The incidence of this is very unlikely in patients who take the  medications as ordered and stop the medications as instructed.  - Opioid overdose can be dangerous, but is unlikely when the medication is taken as ordered and stopped when ordered. It is important not to mix opioids with alcohol as this can lead to over sedation and respiratory difficulty.  - DOSAGE:  - After the initial surgical pain begins to resolve, you may begin to decrease the pain medication. By the end of a few weeks, you should be off of pain medications.  - Refills will not be given by the office during evening hours, on weekends, or after 6 weeks post-op. You are responsible for weaning off of pain medication. You can increase the time between narcotic pills, taking one every 4 then 6 then 8 hours and so on.  - To seek refills on pain medications during the initial 6-week post-operative period, you must call the office to request the refill. The office will then notify you when to  the prescription. DO NOT wait until you are out of the medication to request a refill. Prescriptions will not be filled over the weekend and depending on the schedule, it may take a couple days for the prescription to be available. Someone will have to pick the prescription in person at the office.    • FOLLOW-UP VISITS  o You will need to follow up in the office with your surgeon in 3 weeks, or as instructed elsewhere in your discharge paperwork. Please call this number [phone] to schedule this appointment. If you are going to an SNF/SNU facility, they will arrange for you to follow up in the office.  o If you have any concerns or suspected complications prior to your follow up visit, please call the office. Do not wait until your appointment time if you suspect complications. These will need to be addressed in the office promptly.      David Stein II, MD  Orthopaedic Surgery  [facility]

## 2020-12-16 NOTE — PLAN OF CARE
Goal Outcome Evaluation:  Plan of Care Reviewed With: patient  Progress: improving  Outcome Summary: Pt doing well and agreeable to PT this am. He is s/p R THR revision and presents with expected post op pain, weakness, and decreased mobility. Pt is independent at baseline and plans to DC home later today. Pt is moving quite well. He was able to tolerate all hip exercises and ambulate approx 150 ft with Rwx and CGA. No unsteadiness noted. RWx and BSC ordered for home. Pt with no further concerns at this time.  Patient was wearing a face mask during this therapy encounter. Therapist used appropriate personal protective equipment including eye protection, mask, and gloves.  Mask used was standard procedure mask. Appropriate PPE was worn during the entire therapy session. Hand hygiene was completed before and after therapy session. Patient is not in enhanced droplet precautions.

## 2020-12-16 NOTE — DISCHARGE SUMMARY
Orthopaedic Discharge Summary  Dr. KAITLIN Ovalle” Emil II  [phone]    NAME: Hieu Mann PCP: No primary care provider on file.   :  MRN: [id number]  [id number] LOS:  ADMIT: 1 days  12/15/2020   AGE/SEX: 70 y.o. male DC:  today             · Admitting Diagnosis: S/P revision of total hip [Z96.649]    · Surgery Performed: [place] TOTAL HIP ARTHROPLASTY [82014] (RIGHT  HIP  REVISION ANTERIOR)    · Discharge Medications:         Discharge Medications      New Medications      Instructions Start Date   aspirin 325 MG tablet  Commonly known as: Denice Aspirin   325 mg, Oral, Daily      oxyCODONE-acetaminophen 5-325 MG per tablet  Commonly known as: PERCOCET   1 tablet, Oral, Every 4 Hours PRN         Continue These Medications      Instructions Start Date   atorvastatin 20 MG tablet  Commonly known as: LIPITOR   20 mg, Oral, Nightly      Chlorhexidine Gluconate 2 % pads   Apply externally, AS DIRECTED PAT       multivitamin with minerals tablet tablet   1 tablet, Oral, Daily, HOLD FOR SURGERY      mupirocin 2 % ointment  Commonly known as: BACTROBAN   Topical, 3 Times Daily, AS DIRECTED PAT       sildenafil 25 MG tablet  Commonly known as: VIAGRA   25 mg, Oral, Daily PRN, HOLD FOR SURGERY      VITAMIN D3 PO   3 tablets, Oral, Daily, HOLD FOR SURGERY              · Vitals:     Vitals:    12/15/20 1938 12/15/20 2338 20 0358 20 0700   BP: 92/63 95/55 97/58 119/59   BP Location: Left arm Left arm Left arm Right arm   Patient Position: Lying Lying Lying Lying   Pulse:    78   Resp: 18 18 16 16   Temp: 98.3 °F (36.8 °C) 97.7 °F (36.5 °C) 98 °F (36.7 °C) 98.3 °F (36.8 °C)   TempSrc: Oral Oral Oral Oral   SpO2:       Weight:       Height:           · Labs:      Admission on 12/15/2020   Component Date Value Ref Range Status   • Wound Culture 12/15/2020 No growth   Preliminary   • Gram Stain 12/15/2020 No organisms seen   Preliminary   • Gram Stain 12/15/2020 Rare (1+) WBCs per low power field   Preliminary   •  Wound Culture 12/15/2020 No growth   Preliminary   • Gram Stain 12/15/2020 No organisms seen   Preliminary   • Gram Stain 12/15/2020 Moderate (3+) WBCs per low power field   Preliminary   • Wound Culture 12/15/2020 No growth   Preliminary   • Gram Stain 12/15/2020 No organisms seen   Preliminary   • Gram Stain 12/15/2020 Few (2+) WBCs per low power field   Preliminary   • Hemoglobin 12/15/2020 12.7* 13.0 - 17.7 g/dL Final   • Hematocrit 12/15/2020 36.7* 37.5 - 51.0 % Final   • Glucose 12/16/2020 128* 65 - 99 mg/dL Final   • BUN 12/16/2020 10  8 - 23 mg/dL Final   • Creatinine 12/16/2020 0.57* 0.76 - 1.27 mg/dL Final   • Sodium 12/16/2020 138  136 - 145 mmol/L Final   • Potassium 12/16/2020 4.1  3.5 - 5.2 mmol/L Final   • Chloride 12/16/2020 106  98 - 107 mmol/L Final   • CO2 12/16/2020 23.2  22.0 - 29.0 mmol/L Final   • Calcium 12/16/2020 8.4* 8.6 - 10.5 mg/dL Final   • eGFR Non African Amer 12/16/2020 141  >60 mL/min/1.73 Final   • BUN/Creatinine Ratio 12/16/2020 17.5  7.0 - 25.0 Final   • Anion Gap 12/16/2020 8.8  5.0 - 15.0 mmol/L Final   • Hemoglobin 12/16/2020 12.1* 13.0 - 17.7 g/dL Final   • Hematocrit 12/16/2020 35.5* 37.5 - 51.0 % Final        No results found.    · Hospital Course:   70 y.o. male was admitted to [facility] to services of David Stein II, MD with S/P revision of total hip [Z96.649] on 12/15/2020 and underwent [place] TOTAL HIP ARTHROPLASTY [90102] (RIGHT  HIP  REVISION ANTERIOR). Post-operatively the patient transferred to the floor where the patient underwent mobilization therapy. Opioids were titrated to achieve appropriate pain management to allow for participation in mobilization exercises. Vital signs and laboratory values are now within safe parameters for discharge. The dressings and/or incision is intact without signs or symptoms of active infection. Operative extremity neurovascular status remains intact as compared to the preoperative exam. Appropriate education re:  "incision care, activity levels, medications, and follow up visits was completed and all questions were answered. The patient is now deemed stable for discharge.    HOME: The patient progressed well with physical therapy. There were cleared for discharge to home. The patietn was sent home in good condition}.       R \"Junaid\" Emil ESCOTO MD  Orthopaedic Surgery  [facility]  [phone]                                               "

## 2020-12-16 NOTE — DISCHARGE PLACEMENT REQUEST
"Marino Mcdonoughhen TEE (70 y.o. Male)     Date of Birth Social Security Number Address Home Phone MRN    1950  [id number]  [facility] [address] [phone] [id number]    Jewish Marital Status          Adventism        Admission Date Admission Type Admitting Provider Attending Provider Department, Room/Bed    12/15/20 Elective David Stein II, MD Sweet, Richard Alexander II, MD [address], [address]1    Discharge Date Discharge Disposition Discharge Destination         Home or Self Care              Attending Provider: David Stein II, MD    Allergies: No Known Allergies    Isolation: None   Infection: None   Code Status: CPR    Ht: 172.7 cm (68\")   Wt: 66.7 kg (147 lb)    Admission Cmt: None   Principal Problem: None                Active Insurance as of 12/15/2020     Primary Coverage     Payor Plan Insurance Group Employer/Plan Group    MEDICARE RAILROAD MEDICARE      Payor Plan Address Payor Plan Phone Number Payor Plan Fax Number Effective Dates    [address] [phone]  5/1/2006 - None Entered    [facility] [id number]       Subscriber Name Subscriber Birth Date Member ID       HIEU MCDONOUGH 1950 [id number]           Secondary Coverage     Payor Plan Insurance Group Employer/Plan Group    [facility] SUPP [id number]     Payor Plan Address Payor Plan Phone Number Payor Plan Fax Number Effective Dates    [address]   1/1/2017 - None Entered    [facility] [id number]       Subscriber Name Subscriber Birth Date Member ID       HIEU MCDONOUGH 1950 [id number]                 Emergency Contacts      (Rel.) Home Phone Work Phone Mobile Phone    MackenzieMyrna (Spouse) [phone] -- --              "

## 2020-12-16 NOTE — PLAN OF CARE
Goal Outcome Evaluation:  Plan of Care Reviewed With: patient  Progress: no change  Outcome Summary: No c/o pain, post op day 1, SONAM dressing C/D/I, IV fluids and IV abx continued. No s/s of distress noted will continue to monitor

## 2020-12-16 NOTE — PROGRESS NOTES
Continued Stay Note  [facility]     Patient Name: Hieu Mann  MRN: [id number]  Today's Date: 12/16/2020    Admit Date: 12/15/2020    Discharge Plan     Row Name 12/16/20 0845       Plan    Plan  Plan home with [facility] if accepted.  PAUL Haley RN    Provided Post Acute Provider List?  Yes    Post Acute Provider List  Home Health    Provided Post Acute Provider Quality & Resource List?  Refused    Patient/Family in Agreement with Plan  yes    Plan Comments  FACE SHEET VERIFIED/ IM LETTER SIGNED.  Spoke to pt at bedside. Pt's PCP is Dr. Clara Pallares.  Pt lives in single story house with spouse  ( Myrna Mann  ( [phone]).    Pt is independent with ADLs.   Pt has a grab bar in  for home use.  Pt gets prescriptions at  [facility]  ([facility]).  Pt is not current with HH.   Pt provided a list of HH agencies and choose [facility].  [facility] ( [phone]) called to follow.  Pt states [facility] can provide DMEs that may be needed.  Pt states he has been in [facility] for skilled care.  Pt denies any other needs.  Plan home with [facility] if accepted.   PAUL Haley RN        Discharge Codes    No documentation.       Expected Discharge Date and Time     Expected Discharge Date Expected Discharge Time    Dec 16, 2020             Norma Haley, RN

## 2020-12-16 NOTE — PROGRESS NOTES
Orthopaedic Surgery   Daily Progress Note  Dr. KAITLIN Stein II  [phone]  DEMOGRAPHICS:   · Hieu Mann   · Age:70 y.o.   · MRN:[id number]  · Admitted: 12/15/2020    PROCEDURE: 1 Day Post-Op s/p Procedure(s):  RT. TOTAL HIP ARTHROPLASTY     HOSPITAL PROGRESS  · Patient Issues: Has been quite stable overnight.  No issues.  Patient complains of no pain  · Ambulation/Activity: Minimal activity since surgery     VITALS:  Vitals:    12/15/20 1938 12/15/20 2338 12/16/20 0358 12/16/20 0700   BP: 92/63 95/55 97/58 119/59   BP Location: Left arm Left arm Left arm Right arm   Patient Position: Lying Lying Lying Lying   Pulse:    78   Resp: 18 18 16 16   Temp: 98.3 °F (36.8 °C) 97.7 °F (36.5 °C) 98 °F (36.7 °C) 98.3 °F (36.8 °C)   TempSrc: Oral Oral Oral Oral   SpO2:       Weight:       Height:           PHYSICAL EXAM:  · LUNGS: Equal chest rise, no shortness of air  · CARDIOVASCULAR: brisk capillary refill intact  · WOUND: Dressings clean, dry, and intact  · EXTREMITY: Operative Leg  · Pulses: brisk capillary refill intact  · Sensation: Sensation intact to light touch to the saphenous/sural/tibial/deep peroneal/superficial peroneal nerves, and grossly throughout the extremity.  · Motor: 5/5 EHL/FHL/TA/GS motor complexes    LABS:   Lab Results   Component Value Date    HGB 12.1 (L) 12/16/2020     Lab Results   Component Value Date    WBC 6.54 12/11/2020     Lab Results   Component Value Date    GLUCOSE 128 (H) 12/16/2020    CALCIUM 8.4 (L) 12/16/2020     12/16/2020    K 4.1 12/16/2020    CO2 23.2 12/16/2020     12/16/2020    BUN 10 12/16/2020    CREATININE 0.57 (L) 12/16/2020    EGFRIFNONA 141 12/16/2020    BCR 17.5 12/16/2020    ANIONGAP 8.8 12/16/2020       ASSESSMENT: Patient is a 70 y.o. male who is 1 Day Post-Op s/p Procedure(s):  RT. TOTAL HIP ARTHROPLASTY     PLAN:   · Weight Bearing: Weight Bearing As Tolerated  · Labs: Above lab values review. Plan: No intervention necessary at this time.  "  · PT/OT: To Mobilize  · DVT PPX: ASA 325mg Daily for 30 days  · Post-Op Xray: KERRIE implants in good position.   · Follow-Up: In office at 3 weeks postop  · Dispo: Home today    R \"Junaid\" Emil ESCOTO MD  Orthopaedic Surgery  [facility]  [phone] - [place] Office  [phone] - [place] Office      "

## 2020-12-17 LAB
LAB AP CASE REPORT: NORMAL
LAB AP CLINICAL INFORMATION: NORMAL
Lab: NORMAL
PATH REPORT.FINAL DX SPEC: NORMAL
PATH REPORT.GROSS SPEC: NORMAL

## 2020-12-18 LAB
BACTERIA SPEC AEROBE CULT: NORMAL
GRAM STN SPEC: NORMAL

## 2020-12-20 LAB
BACTERIA SPEC ANAEROBE CULT: NORMAL

## 2021-03-15 ENCOUNTER — OFFICE VISIT (OUTPATIENT)
Dept: CARDIOLOGY | Facility: CLINIC | Age: 71
End: 2021-03-15

## 2021-03-15 VITALS
WEIGHT: 161 LBS | DIASTOLIC BLOOD PRESSURE: 56 MMHG | BODY MASS INDEX: 24.4 KG/M2 | SYSTOLIC BLOOD PRESSURE: 120 MMHG | OXYGEN SATURATION: 98 % | HEIGHT: 68 IN | RESPIRATION RATE: 18 BRPM | HEART RATE: 62 BPM

## 2021-03-15 DIAGNOSIS — Z86.79 HISTORY OF ATRIAL FIBRILLATION: ICD-10-CM

## 2021-03-15 DIAGNOSIS — I35.0 NONRHEUMATIC AORTIC VALVE STENOSIS: Primary | ICD-10-CM

## 2021-03-15 PROCEDURE — 99214 OFFICE O/P EST MOD 30 MIN: CPT | Performed by: NURSE PRACTITIONER

## 2021-03-15 PROCEDURE — 93000 ELECTROCARDIOGRAM COMPLETE: CPT | Performed by: NURSE PRACTITIONER

## 2021-03-15 NOTE — PROGRESS NOTES
Date of Office Visit: 03/15/2021  Encounter Provider: KAITY Farrell  Place of Service: [facility]  Patient Name: Hieu Mann  :1950    Chief Complaint   Patient presents with   • AORTIC SCLEROSIS     3 MTH FOLLOW UP   :     HPI: Hieu Mann is a 70 y.o. male who presents today for follow-up.  Old records have been obtained and reviewed by me.  He is a patient of Dr. Hidalgo with a past cardiac history significant for paroxysmal atrial fibrillation (in the presence of [place] spotted fever) and aortic valve stenosis.  There has been no recurrence of atrial fibrillation since his infection.     He was first seen in the office by Dr. Quintana in 2020 for evaluation of his aortic valve and preoperative risk stratification for upcoming hip surgery.  Echocardiogram from December revealed normal LV systolic function and severe aortic valve stenosis.  Due to the asymptomatic nature of his AS, no further cardiac testing was recommended.  He was advised to follow-up in 3 months.   He has been doing well.  He had his hip surgery on 15 December which went really well.  He denies any chest pain, shortness of breath, palpitations, edema, dizziness, or syncope.  He delivers flowers 3 days a week.  He also walks 2 miles about 3 days a week ago as well.  He is able to complete these activities with no symptoms whatsoever.  His son is a nephrologist here in town.    Past Medical History:   Diagnosis Date   • Aortic valve stenosis    • Arthritis    • Elevated cholesterol    • Epigastric hernia    • Heart murmur     NO TREATMENT   • History of irregular heartbeat     A-FIB   • History of [place] spotted fever    • Hyperlipidemia    • Swelling     RIGHT HIP AREA       Past Surgical History:   Procedure Laterality Date   • CATARACT EXTRACTION WITH INTRAOCULAR LENS IMPLANT Bilateral    • COLONOSCOPY N/A     DR. HONEYCUTT   • EPIGASTRIC HERNIA REPAIR N/A  3/9/2018    Procedure: EPIGASTRIC HERNIA REPAIR;  Surgeon: David Finn MD;  Location: [facility] OR [facility];  Service: General   • LAPAROSCOPIC INGUINAL HERNIA REPAIR Left 03/27/2015    Dr. Hieu Khan   • REPLACEMENT TOTAL KNEE Left 08/06/2012    Dr. David Stein   • REPLACEMENT TOTAL KNEE Right 11/12/2003    Right total knee replacement, cementless Young Profix-Dr. David Stein   • TOTAL HIP ARTHROPLASTY Right 05/11/2009    Dr. David Stein   • TOTAL HIP ARTHROPLASTY Left 02/24/2011    Dr. David Stein   • TOTAL HIP ARTHROPLASTY Right 12/15/2020    Procedure: RT. TOTAL HIP ARTHROPLASTY;  Surgeon: David Stein II, MD;  Location: [facility] MAIN OR;  Service: Orthopedics;  Laterality: Right;       Social History     Socioeconomic History   • Marital status:      Spouse name: Not on file   • Number of children: Not on file   • Years of education: Not on file   • Highest education level: Not on file   Tobacco Use   • Smoking status: Never Smoker   • Smokeless tobacco: Never Used   • Tobacco comment: CAFFEINE USE: 1 CUP COFFEE DAILY   Vaping Use   • Vaping Use: Never used   Substance and Sexual Activity   • Alcohol use: Yes     Comment: ONE BEER ONCE MONTH   • Drug use: No   • Sexual activity: Defer       Family History   Problem Relation Age of Onset   • Lung cancer Father    • Malig Hyperthermia Neg Hx        Review of Systems   Constitutional: Negative.   Cardiovascular: Negative.  Negative for chest pain, dyspnea on exertion, leg swelling, orthopnea, paroxysmal nocturnal dyspnea and syncope.   Respiratory: Negative.    Hematologic/Lymphatic: Negative for bleeding problem.   Musculoskeletal: Negative for falls.   Gastrointestinal: Negative for melena.   Neurological: Negative for dizziness and light-headedness.       No Known Allergies      Current Outpatient Medications:   •  atorvastatin (LIPITOR) 20 MG tablet, Take 20 mg by mouth Every Night., Disp: , Rfl:   •  Chlorhexidine Gluconate 2 %  "pads, Apply  topically. AS DIRECTED PAT, Disp: , Rfl:   •  Cholecalciferol (VITAMIN D3 PO), Take 3 tablets by mouth Daily. HOLD FOR SURGERY, Disp: , Rfl:   •  Multiple Vitamins-Minerals (CENTRUM SILVER ADULT 50+ PO), Take 1 tablet by mouth Daily. HOLD FOR SURGERY, Disp: , Rfl:   •  mupirocin (BACTROBAN) 2 % ointment, Apply  topically to the appropriate area as directed 3 (Three) Times a Day. AS DIRECTED PAT, Disp: , Rfl:   •  sildenafil (VIAGRA) 25 MG tablet, Take 25 mg by mouth Daily As Needed for Erectile Dysfunction. HOLD FOR SURGERY, Disp: , Rfl:       Objective:     Vitals:    03/15/21 0853 03/15/21 0900   BP: 126/62 120/56   BP Location: Right arm Left arm   Patient Position: Sitting Sitting   Pulse: 71 62   Resp: 18    SpO2: 98%    Weight: 73 kg (161 lb)    Height: 172.7 cm (68\")      Body mass index is 24.48 kg/m².    PHYSICAL EXAM:    Neck:      Vascular: No JVD.   Pulmonary:      Effort: Pulmonary effort is normal.      Breath sounds: Normal breath sounds.   Cardiovascular:      Normal rate. Regular rhythm.      Murmurs: There is a grade 1/6 harsh midsystolic murmur at the URSB, radiating to the neck.      No gallop. No click. No rub.   Pulses:     Intact distal pulses.           ECG 12 Lead    Date/Time: 3/15/2021 9:08 AM  Performed by: Raiza Solorzano APRN  Authorized by: Raiza Solorzano APRN   Comparison: compared with previous ECG from 11/9/2020  Similar to previous ECG  Rhythm: sinus rhythm  Rate: normal  BPM: 62    Clinical impression: normal ECG  Comments: Indication: Aortic stenosis              Assessment:       Diagnosis Plan   1. Nonrheumatic aortic valve stenosis  ECG 12 Lead    Adult Transthoracic Echo Complete W/ Cont if Necessary Per Protocol   2. History of atrial fibrillation       Orders Placed This Encounter   Procedures   • ECG 12 Lead     This order was created via procedure documentation   • Adult Transthoracic Echo Complete W/ Cont if Necessary Per Protocol     Standing " Status:   Future     Standing Expiration Date:   3/15/2022     Scheduling Instructions:      Same day as appt with Dr. Quintana     Order Specific Question:   Reason for exam?     Answer:   Valvular Function     Order Specific Question:   Valvular Function specification?     Answer:   Native Valvular Stenosis          Plan:       1.  Aortic valve stenosis.  Echocardiogram from December noted severe aortic stenosis with normal LV function.  Repeat echocardiogram in 3 months.      2.  History of atrial fibrillation.  No recurrence.  He is in sinus rhythm today.      I think he is stable and doing well.  He denies any symptoms of angina or heart failure.  I will schedule him for repeat echocardiogram in 3 months with a follow-up with Dr. Quintana that day.  He will call us if he has any problems before then.      As always, it has been a pleasure to participate in your patient's care.      Sincerely,         KAITY Escamilla

## 2021-07-01 ENCOUNTER — OFFICE VISIT (OUTPATIENT)
Dept: CARDIOLOGY | Facility: CLINIC | Age: 71
End: 2021-07-01

## 2021-07-01 ENCOUNTER — HOSPITAL ENCOUNTER (OUTPATIENT)
Dept: CARDIOLOGY | Facility: HOSPITAL | Age: 71
Discharge: HOME | End: 2021-07-01
Admitting: NURSE PRACTITIONER

## 2021-07-01 VITALS
DIASTOLIC BLOOD PRESSURE: 60 MMHG | SYSTOLIC BLOOD PRESSURE: 130 MMHG | HEIGHT: 67 IN | HEART RATE: 75 BPM | BODY MASS INDEX: 24.8 KG/M2 | WEIGHT: 158 LBS

## 2021-07-01 VITALS
BODY MASS INDEX: 25.9 KG/M2 | HEART RATE: 60 BPM | HEIGHT: 67 IN | DIASTOLIC BLOOD PRESSURE: 60 MMHG | SYSTOLIC BLOOD PRESSURE: 130 MMHG | WEIGHT: 165 LBS

## 2021-07-01 DIAGNOSIS — I35.0 NONRHEUMATIC AORTIC VALVE STENOSIS: ICD-10-CM

## 2021-07-01 DIAGNOSIS — E78.5 HYPERLIPIDEMIA LDL GOAL <100: ICD-10-CM

## 2021-07-01 DIAGNOSIS — I35.0 NONRHEUMATIC AORTIC VALVE STENOSIS: Primary | ICD-10-CM

## 2021-07-01 PROCEDURE — 93000 ELECTROCARDIOGRAM COMPLETE: CPT | Performed by: INTERNAL MEDICINE

## 2021-07-01 PROCEDURE — 93306 TTE W/DOPPLER COMPLETE: CPT | Performed by: INTERNAL MEDICINE

## 2021-07-01 PROCEDURE — 93306 TTE W/DOPPLER COMPLETE: CPT

## 2021-07-01 PROCEDURE — 99214 OFFICE O/P EST MOD 30 MIN: CPT | Performed by: INTERNAL MEDICINE

## 2021-07-01 NOTE — PROGRESS NOTES
Date of Office Visit: 21  Encounter Provider: Master Quintana MD  Place of Service: [facility] CARDIOLOGY  Patient Name: Hieu Mann  :1950    Chief Complaint   Patient presents with   • Follow-up     3 month   • Aortic Stenosis   :     HPI:    70 y.o. male who presents today for follow-up.  Old records have been obtained and reviewed by me.  He is a patient of Dr. Armenta's with a past cardiac history significant for paroxysmal atrial fibrillation (in the presence of [place] spotted fever) and aortic valve stenosis.  There has been no recurrence of atrial fibrillation since his infection.      He was first seen in the office by me in 2020 for evaluation of his aortic valve and preoperative risk stratification for upcoming hip surgery.  Echocardiogram from December revealed normal LV systolic function and severe aortic valve stenosis.  Due to the asymptomatic nature of his AS, no further cardiac testing was recommended.      Since her last visit he is continue to stay active and denies any dyspnea or chest pain.  He has had no lightheadedness or syncope.  He had a repeat echocardiogram today with stability of his aortic valve stenosis and no dilation of the left ventricle.    Past Medical History:   Diagnosis Date   • Aortic valve stenosis    • Arthritis    • Elevated cholesterol    • Epigastric hernia    • Heart murmur     NO TREATMENT   • History of irregular heartbeat     A-FIB   • History of [place] spotted fever    • Hyperlipidemia    • Swelling     RIGHT HIP AREA       Past Surgical History:   Procedure Laterality Date   • CATARACT EXTRACTION WITH INTRAOCULAR LENS IMPLANT Bilateral    • COLONOSCOPY N/A     DR. HONEYCUTT   • EPIGASTRIC HERNIA REPAIR N/A 3/9/2018    Procedure: EPIGASTRIC HERNIA REPAIR;  Surgeon: David Finn MD;  [place] [facility] [place] [facility] [place]  Service: General   • LAPAROSCOPIC INGUINAL HERNIA REPAIR Left 2015      Hieu Khan   • REPLACEMENT TOTAL KNEE Left 08/06/2012    Dr. David Stein   • REPLACEMENT TOTAL KNEE Right 11/12/2003    Right total knee replacement, cementless Hannah Profix-Dr. David Stein   • TOTAL HIP ARTHROPLASTY Right 05/11/2009    Dr. David Stein   • TOTAL HIP ARTHROPLASTY Left 02/24/2011    Dr. David Stein   • TOTAL HIP ARTHROPLASTY Right 12/15/2020    Procedure: RT. TOTAL HIP ARTHROPLASTY;  Surgeon: David Stein II, MD;  Location: [facility];  Service: Orthopedics;  Laterality: Right;       Social History     Socioeconomic History   • Marital status:      Spouse name: Not on file   • Number of children: Not on file   • Years of education: Not on file   • Highest education level: Not on file   Tobacco Use   • Smoking status: Never Smoker   • Smokeless tobacco: Never Used   • Tobacco comment: CAFFEINE USE: 1 CUP COFFEE DAILY   Vaping Use   • Vaping Use: Never used   Substance and Sexual Activity   • Alcohol use: Yes     Comment: ONE BEER ONCE MONTH   • Drug use: No   • Sexual activity: Defer       Family History   Problem Relation Age of Onset   • Lung cancer Father    • Malig Hyperthermia Neg Hx        Review of Systems   Constitutional: Negative. Negative for fever and malaise/fatigue.   HENT: Negative for nosebleeds and sore throat.    Eyes: Negative for blurred vision and double vision.   Cardiovascular: Negative.  Negative for chest pain, claudication, dyspnea on exertion, leg swelling, orthopnea, palpitations, paroxysmal nocturnal dyspnea and syncope.   Respiratory: Negative.  Negative for cough, shortness of breath and snoring.    Endocrine: Negative for cold intolerance, heat intolerance and polydipsia.   Hematologic/Lymphatic: Negative for bleeding problem.   Skin: Negative for itching, poor wound healing and rash.   Musculoskeletal: Negative for falls, joint pain, joint swelling, muscle weakness and myalgias.   Gastrointestinal: Negative for abdominal pain,  "melena, nausea and vomiting.   Neurological: Negative for dizziness, light-headedness, loss of balance, seizures, vertigo and weakness.   Psychiatric/Behavioral: Negative for altered mental status and depression.       Allergies   Allergen Reactions   • Simvastatin Myalgia     Other reaction(s): Muscle pain         Current Outpatient Medications:   •  atorvastatin (LIPITOR) 20 MG tablet, Take 20 mg by mouth Every Night., Disp: , Rfl:   •  Chlorhexidine Gluconate 2 % pads, Apply  topically. AS DIRECTED PAT, Disp: , Rfl:   •  Cholecalciferol (VITAMIN D3 PO), Take 3 tablets by mouth Daily. HOLD FOR SURGERY, Disp: , Rfl:   •  Multiple Vitamins-Minerals (CENTRUM SILVER ADULT 50+ PO), Take 1 tablet by mouth Daily. HOLD FOR SURGERY, Disp: , Rfl:   •  mupirocin (BACTROBAN) 2 % ointment, Apply  topically to the appropriate area as directed 3 (Three) Times a Day. AS DIRECTED PAT, Disp: , Rfl:   •  sildenafil (VIAGRA) 25 MG tablet, Take 25 mg by mouth Daily As Needed for Erectile Dysfunction. HOLD FOR SURGERY, Disp: , Rfl:       Objective:     Vitals:    07/01/21 0822   BP: 130/60   Pulse: 60   Weight: 74.8 kg (165 lb)   Height: 170.2 cm (67\")     Body mass index is 25.84 kg/m².    PHYSICAL EXAM:    Constitutional:       Appearance: Well-developed.   Eyes:      General: No scleral icterus.     Conjunctiva/sclera: Conjunctivae normal.   HENT:      Head: Normocephalic and atraumatic.   Neck:      Thyroid: No thyromegaly.      Vascular: Normal carotid pulses. No carotid bruit, hepatojugular reflux or JVD.      Trachea: No tracheal deviation.   Pulmonary:      Effort: Pulmonary effort is normal. No respiratory distress.      Breath sounds: Normal breath sounds.   Chest:      Chest wall: Not tender to palpatation.   Cardiovascular:      Normal rate. Regular rhythm.      Murmurs: There is a grade 2/6 harsh midsystolic murmur at the URSB, radiating to the neck.      No gallop. No click. No rub.   Abdominal:      General: Bowel sounds " are normal. There is no distension.      Palpations: Abdomen is soft.      Tenderness: There is no abdominal tenderness.   Musculoskeletal:         General: No deformity.      Cervical back: Normal range of motion and neck supple. Skin:     Findings: No erythema or rash.   Neurological:      Mental Status: Alert and oriented to person, place, and time.      Sensory: No sensory deficit.   Psychiatric:         Behavior: Behavior normal.           ECG 12 Lead    Date/Time: 7/1/2021 8:59 AM  Performed by: Master Quintana MD  Authorized by: Master Quintana MD   Comparison: compared with previous ECG from 3/15/2021  Similar to previous ECG  Rhythm: sinus rhythm  Rate: normal  QRS axis: normal    Clinical impression: normal ECG              Assessment:      No diagnosis found.  No orders of the defined types were placed in this encounter.         Plan:   70-year-old male with a medical history of severe aortic valve stenosis that is asymptomatic, hyperlipidemia, who presents to me in follow-up.  He is doing very well and is still asymptomatic.  His left ventricular dimensions are normal.  He denies any new issues.  He is tolerating his atorvastatin well      1.  Aortic valve stenosis.  Degenerative.  Severe.  Asymptomatic.  -Echocardiogram images have been reviewed.  I do not see a surgical indication currently for his aortic valve but I have informed him that likely within the next few years this will need to be replaced.  We will continue to monitor him clinically every 6 months and plan on a repeat echocardiogram in a year.    2.  History of atrial fibrillation.  No recurrence.  He is in sinus rhythm today.    3.  Hyperlipidemia: Continue atorvastatin therapy at 20 mg p.o. nightly.  Transaminases normal in the past.  No myalgias.  Defer follow-up lipid panel to primary team.

## 2021-07-02 LAB
AORTIC ARCH: 2.6 CM
BH CV ECHO MEAS - ACS: 1.1 CM
BH CV ECHO MEAS - AI MAX PG: 69.3 MMHG
BH CV ECHO MEAS - AI MAX VEL: 416.3 CM/SEC
BH CV ECHO MEAS - AO ARCH DIAM (PROXIMAL TRANS.): 2.6 CM
BH CV ECHO MEAS - AO MAX PG (FULL): 63.8 MMHG
BH CV ECHO MEAS - AO MAX PG: 67 MMHG
BH CV ECHO MEAS - AO MEAN PG (FULL): 32.3 MMHG
BH CV ECHO MEAS - AO MEAN PG: 34 MMHG
BH CV ECHO MEAS - AO ROOT AREA (BSA CORRECTED): 1.7
BH CV ECHO MEAS - AO ROOT AREA: 8 CM^2
BH CV ECHO MEAS - AO ROOT DIAM: 3.2 CM
BH CV ECHO MEAS - AO V2 MAX: 410.2 CM/SEC
BH CV ECHO MEAS - AO V2 MEAN: 274 CM/SEC
BH CV ECHO MEAS - AO V2 VTI: 94.8 CM
BH CV ECHO MEAS - AVA(I,A): 0.7 CM^2
BH CV ECHO MEAS - AVA(I,D): 0.7 CM^2
BH CV ECHO MEAS - AVA(V,A): 0.73 CM^2
BH CV ECHO MEAS - AVA(V,D): 0.73 CM^2
BH CV ECHO MEAS - BSA(HAYCOCK): 1.8 M^2
BH CV ECHO MEAS - BSA: 1.8 M^2
BH CV ECHO MEAS - BZI_BMI: 24.7 KILOGRAMS/M^2
BH CV ECHO MEAS - BZI_METRIC_HEIGHT: 170.2 CM
BH CV ECHO MEAS - BZI_METRIC_WEIGHT: 71.7 KG
BH CV ECHO MEAS - EDV(MOD-SP2): 84 ML
BH CV ECHO MEAS - EDV(MOD-SP4): 91 ML
BH CV ECHO MEAS - EDV(TEICH): 112.8 ML
BH CV ECHO MEAS - EF(CUBED): 78 %
BH CV ECHO MEAS - EF(MOD-BP): 65 %
BH CV ECHO MEAS - EF(MOD-SP2): 63.1 %
BH CV ECHO MEAS - EF(MOD-SP4): 64.8 %
BH CV ECHO MEAS - EF(TEICH): 70 %
BH CV ECHO MEAS - ESV(MOD-SP2): 31 ML
BH CV ECHO MEAS - ESV(MOD-SP4): 32 ML
BH CV ECHO MEAS - ESV(TEICH): 33.8 ML
BH CV ECHO MEAS - FS: 39.6 %
BH CV ECHO MEAS - IVS/LVPW: 1.1
BH CV ECHO MEAS - IVSD: 1.3 CM
BH CV ECHO MEAS - LAT PEAK E' VEL: 11.7 CM/SEC
BH CV ECHO MEAS - LV DIASTOLIC VOL/BSA (35-75): 49.7 ML/M^2
BH CV ECHO MEAS - LV MASS(C)D: 237.9 GRAMS
BH CV ECHO MEAS - LV MASS(C)DI: 130.1 GRAMS/M^2
BH CV ECHO MEAS - LV MAX PG: 3.5 MMHG
BH CV ECHO MEAS - LV MEAN PG: 1.8 MMHG
BH CV ECHO MEAS - LV SYSTOLIC VOL/BSA (12-30): 17.5 ML/M^2
BH CV ECHO MEAS - LV V1 MAX: 93.4 CM/SEC
BH CV ECHO MEAS - LV V1 MEAN: 64 CM/SEC
BH CV ECHO MEAS - LV V1 VTI: 20.8 CM
BH CV ECHO MEAS - LVIDD: 4.9 CM
BH CV ECHO MEAS - LVIDS: 3 CM
BH CV ECHO MEAS - LVLD AP2: 7.6 CM
BH CV ECHO MEAS - LVLD AP4: 8.2 CM
BH CV ECHO MEAS - LVLS AP2: 6.2 CM
BH CV ECHO MEAS - LVLS AP4: 6.1 CM
BH CV ECHO MEAS - LVOT AREA (M): 3.1 CM^2
BH CV ECHO MEAS - LVOT AREA: 3.2 CM^2
BH CV ECHO MEAS - LVOT DIAM: 2 CM
BH CV ECHO MEAS - LVPWD: 1.2 CM
BH CV ECHO MEAS - MED PEAK E' VEL: 9.6 CM/SEC
BH CV ECHO MEAS - MR MAX PG: 61.1 MMHG
BH CV ECHO MEAS - MR MAX VEL: 390.9 CM/SEC
BH CV ECHO MEAS - MV A DUR: 0.13 SEC
BH CV ECHO MEAS - MV A MAX VEL: 80.6 CM/SEC
BH CV ECHO MEAS - MV DEC SLOPE: 656.3 CM/SEC^2
BH CV ECHO MEAS - MV DEC TIME: 0.18 SEC
BH CV ECHO MEAS - MV E MAX VEL: 90.4 CM/SEC
BH CV ECHO MEAS - MV E/A: 1.1
BH CV ECHO MEAS - MV MAX PG: 3.7 MMHG
BH CV ECHO MEAS - MV MEAN PG: 1.5 MMHG
BH CV ECHO MEAS - MV P1/2T MAX VEL: 98.9 CM/SEC
BH CV ECHO MEAS - MV P1/2T: 44.2 MSEC
BH CV ECHO MEAS - MV V2 MAX: 96.5 CM/SEC
BH CV ECHO MEAS - MV V2 MEAN: 56.3 CM/SEC
BH CV ECHO MEAS - MV V2 VTI: 29.9 CM
BH CV ECHO MEAS - MVA P1/2T LCG: 2.2 CM^2
BH CV ECHO MEAS - MVA(P1/2T): 5 CM^2
BH CV ECHO MEAS - MVA(VTI): 2.2 CM^2
BH CV ECHO MEAS - PA ACC TIME: 0.16 SEC
BH CV ECHO MEAS - PA MAX PG (FULL): 1.1 MMHG
BH CV ECHO MEAS - PA MAX PG: 3.3 MMHG
BH CV ECHO MEAS - PA PR(ACCEL): 6.5 MMHG
BH CV ECHO MEAS - PA V2 MAX: 90.9 CM/SEC
BH CV ECHO MEAS - PULM A REVS DUR: 0.11 SEC
BH CV ECHO MEAS - PULM A REVS VEL: 30.4 CM/SEC
BH CV ECHO MEAS - PULM DIAS VEL: 48.8 CM/SEC
BH CV ECHO MEAS - PULM S/D: 1.2
BH CV ECHO MEAS - PULM SYS VEL: 60.4 CM/SEC
BH CV ECHO MEAS - RAP SYSTOLE: 3 MMHG
BH CV ECHO MEAS - RV MAX PG: 2.2 MMHG
BH CV ECHO MEAS - RV MEAN PG: 1.2 MMHG
BH CV ECHO MEAS - RV V1 MAX: 74.1 CM/SEC
BH CV ECHO MEAS - RV V1 MEAN: 51.8 CM/SEC
BH CV ECHO MEAS - RV V1 VTI: 16.1 CM
BH CV ECHO MEAS - RVSP: 30 MMHG
BH CV ECHO MEAS - SI(AO): 415.9 ML/M^2
BH CV ECHO MEAS - SI(CUBED): 50.2 ML/M^2
BH CV ECHO MEAS - SI(LVOT): 36.4 ML/M^2
BH CV ECHO MEAS - SI(MOD-SP2): 29 ML/M^2
BH CV ECHO MEAS - SI(MOD-SP4): 32.3 ML/M^2
BH CV ECHO MEAS - SI(TEICH): 43.2 ML/M^2
BH CV ECHO MEAS - SUP REN AO DIAM: 2.2 CM
BH CV ECHO MEAS - SV(AO): 760.8 ML
BH CV ECHO MEAS - SV(CUBED): 91.8 ML
BH CV ECHO MEAS - SV(LVOT): 66.7 ML
BH CV ECHO MEAS - SV(MOD-SP2): 53 ML
BH CV ECHO MEAS - SV(MOD-SP4): 59 ML
BH CV ECHO MEAS - SV(TEICH): 79 ML
BH CV ECHO MEAS - TAPSE (>1.6): 2.5 CM
BH CV ECHO MEAS - TR MAX VEL: 259.3 CM/SEC
BH CV ECHO MEASUREMENTS AVERAGE E/E' RATIO: 8.49
BH CV XLRA - RV BASE: 3.4 CM
BH CV XLRA - RV LENGTH: 6.2 CM
BH CV XLRA - RV MID: 2.5 CM
BH CV XLRA - TDI S': 14.6 CM/SEC
LEFT ATRIUM VOLUME INDEX: 34 ML/M2
MAXIMAL PREDICTED HEART RATE: 149 BPM
SINUS: 3 CM
STJ: 2.5 CM
STRESS TARGET HR: 127 BPM

## 2021-11-12 ENCOUNTER — OFFICE VISIT (OUTPATIENT)
Dept: CARDIOLOGY | Facility: CLINIC | Age: 71
End: 2021-11-12

## 2021-11-12 VITALS
WEIGHT: 158 LBS | HEIGHT: 67 IN | HEART RATE: 75 BPM | BODY MASS INDEX: 24.8 KG/M2 | DIASTOLIC BLOOD PRESSURE: 72 MMHG | SYSTOLIC BLOOD PRESSURE: 122 MMHG

## 2021-11-12 DIAGNOSIS — I35.0 AORTIC STENOSIS, MODERATE: Primary | ICD-10-CM

## 2021-11-12 DIAGNOSIS — Z86.79 HISTORY OF ATRIAL FIBRILLATION: ICD-10-CM

## 2021-11-12 PROCEDURE — 99213 OFFICE O/P EST LOW 20 MIN: CPT | Performed by: NURSE PRACTITIONER

## 2021-11-12 PROCEDURE — 93000 ELECTROCARDIOGRAM COMPLETE: CPT | Performed by: NURSE PRACTITIONER

## 2021-11-12 NOTE — PROGRESS NOTES
Date of Office Visit: 2021  Encounter Provider: KAITY Chacko  Place of Service: [facility] CARDIOLOGY  Patient Name: Hieu Mann  :1950    Chief Complaint   Patient presents with   • hx of afib     1 yr f/u    • aortic valve sclerosis   :     HPI: Hieu Mann is a 71 y.o. male who is a patient followed by Dr. Armenta and Dr. Quintana.  He had one episode of paroxysmal atrial fib in the setting of [place] spotted fever (he was very ill), hyperlipidemia and aortic valve stenosis.    He presents today for annual follow-up.    He is doing well.  He has not had any recurrence of A. fib that he is aware of, he had hip replacement in December and did very well.  He denies chest pain, dyspnea, PND, orthopnea, dizziness, edema or near syncope.    He is not on anticoagulation due to having no recurrent A. fib and it was just an episode during a severe illness.    Echo in July of this year showed normal LV systolic function, EF 61-65%, moderate to severe aortic stenosis which was stable with mild a aortic regurgitation mild MR and TR.     Past Medical History:   Diagnosis Date   • Aortic valve sclerosis    • Aortic valve stenosis    • Arthritis    • Elevated cholesterol    • Epigastric hernia    • Heart murmur     NO TREATMENT   • History of atrial fibrillation    • History of irregular heartbeat     A-FIB   • History of [place] spotted fever    • Hyperlipidemia    • Swelling     RIGHT HIP AREA       Past Surgical History:   Procedure Laterality Date   • CATARACT EXTRACTION WITH INTRAOCULAR LENS IMPLANT Bilateral    • COLONOSCOPY N/A     DR. HONEYCUTT   • EPIGASTRIC HERNIA REPAIR N/A 3/9/2018    Procedure: EPIGASTRIC HERNIA REPAIR;  Surgeon: David Finn MD;  [place] [facility] [place] [facility] [place]: [place]   • LAPAROSCOPIC INGUINAL HERNIA REPAIR Left 2015    Dr. Hieu Khan   • REPLACEMENT TOTAL KNEE Left 2012    Dr. David Stein   • REPLACEMENT  TOTAL KNEE Right 11/12/2003    Right total knee replacement, cementless Hannah Profix-Dr. David Stein   • TOTAL HIP ARTHROPLASTY Right 05/11/2009    Dr. David Stein   • TOTAL HIP ARTHROPLASTY Left 02/24/2011    Dr. David Stein   • TOTAL HIP ARTHROPLASTY Right 12/15/2020    Procedure: RT. TOTAL HIP ARTHROPLASTY;  Surgeon: David Stein II, MD;  Location: [facility];  Service: Orthopedics;  Laterality: Right;       Social History     Socioeconomic History   • Marital status:    Tobacco Use   • Smoking status: Never Smoker   • Smokeless tobacco: Never Used   • Tobacco comment: CAFFEINE USE: 1 CUP COFFEE DAILY   Vaping Use   • Vaping Use: Never used   Substance and Sexual Activity   • Alcohol use: Yes     Comment: ONE BEER ONCE MONTH   • Drug use: No   • Sexual activity: Defer       Family History   Problem Relation Age of Onset   • Lung cancer Father    • Malig Hyperthermia Neg Hx        Review of Systems   Constitutional: Negative for chills, fever and malaise/fatigue.   Cardiovascular: Negative for chest pain, dyspnea on exertion, leg swelling, near-syncope, orthopnea, palpitations, paroxysmal nocturnal dyspnea and syncope.   Respiratory: Negative for cough and shortness of breath.    Musculoskeletal: Negative for joint pain, joint swelling and myalgias.   Gastrointestinal: Negative for abdominal pain, diarrhea, melena, nausea and vomiting.   Genitourinary: Negative for frequency and hematuria.   Neurological: Negative for light-headedness, numbness, paresthesias and seizures.   Allergic/Immunologic: Negative.    All other systems reviewed and are negative.      Allergies   Allergen Reactions   • Simvastatin Myalgia     Other reaction(s): Muscle pain         Current Outpatient Medications:   •  atorvastatin (LIPITOR) 20 MG tablet, Take 20 mg by mouth Every Night., Disp: , Rfl:   •  Cholecalciferol (VITAMIN D3 PO), Take 3 tablets by mouth Daily. HOLD FOR SURGERY, Disp: , Rfl:   •   "Multiple Vitamins-Minerals (CENTRUM SILVER ADULT 50+ PO), Take 1 tablet by mouth Daily. HOLD FOR SURGERY, Disp: , Rfl:   •  mupirocin (BACTROBAN) 2 % ointment, Apply  topically to the appropriate area as directed 3 (Three) Times a Day. AS DIRECTED PAT, Disp: , Rfl:   •  sildenafil (VIAGRA) 25 MG tablet, Take 25 mg by mouth Daily As Needed for Erectile Dysfunction. HOLD FOR SURGERY, Disp: , Rfl:       Objective:     Vitals:    11/12/21 0842   BP: 122/72   Pulse: 75   Weight: 71.7 kg (158 lb)   Height: 170.2 cm (67\")     Body mass index is 24.75 kg/m².    PHYSICAL EXAM:    Vitals Reviewed.   General Appearance: No acute distress, well developed and well nourished.   Eyes: Conjunctiva and lids: No erythema, swelling, or discharge. Sclera non-icteric.   HENT: Atraumatic, normocephalic. External eyes, ears, and nose normal.   Respiratory: No signs of respiratory distress. Respiration rhythm and depth normal.   Clear to auscultation. No rales, crackles, rhonchi, or wheezing auscultated.   Cardiovascular:  Jugular Venous Pressure: Normal  Heart Rate and Rhythm: Normal, Heart Sounds: Normal S1 and S2. No S3 or S4 noted.  Murmur heard.   Arterial Pulses:  Posterior tibialis and dorsalis pedis pulses normal.   Lower Extremities: No edema noted.  Gastrointestinal:  Abdomen soft, non-distended, non-tender.   Musculoskeletal: Normal movement of extremities  Skin and Nails: General appearance normal. No pallor, cyanosis, diaphoresis. Skin temperature normal. No clubbing of fingernails.   Psychiatric: Patient alert and oriented to person, place, and time. Speech and behavior appropriate. Normal mood and affect.       ECG 12 Lead    Date/Time: 11/12/2021 9:12 AM  Performed by: Adia Broussard APRN  Authorized by: Adia Broussard APRN   Comparison: compared with previous ECG   Similar to previous ECG  Rhythm: sinus rhythm  BPM: 75                Assessment:       Diagnosis Plan   1. Aortic stenosis, moderate     2. History of atrial " fibrillation            Plan:       1. Aortic stenosis, mod to severe by echo in July---stable, asymptomatic, follows with Dr. Quintana.     2. Hx of Afib in the setting of [place] spotted fever, he was very ill, he has not had any recurrence.    Follow-up with KAITY Escamilla in January as scheduled and follow-up with Dr. Armenta in 1 year.    As always, it has been a pleasure to participate in your patient's care.      Sincerely,         KAITY Antony

## 2022-01-04 NOTE — PROGRESS NOTES
Open incarcerated epigastric hernia repair 3/9/2018    Incision has healed well, he is doing well, discussed activity restrictions, return as needed.  
Never smoker

## 2022-01-10 ENCOUNTER — OFFICE VISIT (OUTPATIENT)
Dept: CARDIOLOGY | Facility: CLINIC | Age: 72
End: 2022-01-10

## 2022-01-10 VITALS
WEIGHT: 159 LBS | HEIGHT: 67 IN | SYSTOLIC BLOOD PRESSURE: 120 MMHG | HEART RATE: 59 BPM | DIASTOLIC BLOOD PRESSURE: 60 MMHG | BODY MASS INDEX: 24.96 KG/M2

## 2022-01-10 DIAGNOSIS — Z86.79 HISTORY OF ATRIAL FIBRILLATION: ICD-10-CM

## 2022-01-10 DIAGNOSIS — E78.5 HYPERLIPIDEMIA LDL GOAL <100: ICD-10-CM

## 2022-01-10 DIAGNOSIS — I35.0 AORTIC VALVE STENOSIS, ETIOLOGY OF CARDIAC VALVE DISEASE UNSPECIFIED: Primary | ICD-10-CM

## 2022-01-10 PROCEDURE — 99214 OFFICE O/P EST MOD 30 MIN: CPT | Performed by: NURSE PRACTITIONER

## 2022-01-10 PROCEDURE — 93000 ELECTROCARDIOGRAM COMPLETE: CPT | Performed by: NURSE PRACTITIONER

## 2022-01-10 NOTE — PROGRESS NOTES
Date of Office Visit: 01/10/2022  Encounter Provider: KAITY Kaplan  Place of Service: [facility] CARDIOLOGY  Patient Name: Hieu Mann  :1950    Chief Complaint   Patient presents with   • Aortic Stenosis   :     HPI: Hieu Mann is a 71 y.o. male.  He is a patient of Dr. Armenta's whom we follow for the management of paroxysmal atrial fibrillation (in the presence of [place] spotted fever) and aortic valve stenosis.  There has been no recurrence of atrial fibrillation since his infection.   In 2020, he was seen by Dr. Quintana for evaluation of his aortic valve and preoperative or stratification for upcoming hip surgery.  Given his asymptomatic nature, no further cardiac testing was recommended.  He was last seen in the office by Dr. Quintana in 2021 at which time he was doing well with no complaints of angina or heart failure.  Echocardiogram that day demonstrated normal LV systolic function and severe aortic stenosis.  Dr. Quintana did not see a surgical indication but did inform him that likely within the next few years it will need to be replaced.  Plan is to continue monitoring him clinically every 6 months with a repeat echocardiogram yearly.   He feels great.  He denies any chest pain, shortness of breath, palpitations, edema, dizziness, or syncope.  He is fully vaccinated from COVID-19.  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  He is also planning to have a colonoscopy soon.    Past Medical History:   Diagnosis Date   • Aortic valve sclerosis    • Aortic valve stenosis    • Arthritis    • Elevated cholesterol    • Epigastric hernia    • Heart murmur     NO TREATMENT   • History of atrial fibrillation    • History of irregular heartbeat     A-FIB   • History of [place] spotted fever    • Hyperlipidemia    • Swelling     RIGHT HIP AREA       Past Surgical History:   Procedure Laterality Date   • CATARACT EXTRACTION  WITH INTRAOCULAR LENS IMPLANT Bilateral    • COLONOSCOPY N/A 2014    DR. HONEYCUTT   • EPIGASTRIC HERNIA REPAIR N/A 3/9/2018    Procedure: EPIGASTRIC HERNIA REPAIR;  Surgeon: David Finn MD;  Location: [facility] [place];  Service: General   • LAPAROSCOPIC INGUINAL HERNIA REPAIR Left 03/27/2015    Dr. Hieu Khan   • REPLACEMENT TOTAL KNEE Left 08/06/2012    Dr. David Stein   • REPLACEMENT TOTAL KNEE Right 11/12/2003    Right total knee replacement, cementless [place] Profix-Dr. David Stein   • TOTAL HIP ARTHROPLASTY Right 05/11/2009    Dr. David Stein   • TOTAL HIP ARTHROPLASTY Left 02/24/2011    Dr. David Stein   • TOTAL HIP ARTHROPLASTY Right 12/15/2020    Procedure: RT. TOTAL HIP ARTHROPLASTY;  Surgeon: David Stein II, MD;  [facility];  Service: Orthopedics;  Laterality: Right;       Social History     Socioeconomic History   • Marital status:    Tobacco Use   • Smoking status: Never Smoker   • Smokeless tobacco: Never Used   • Tobacco comment: CAFFEINE USE: 1 CUP COFFEE DAILY   Vaping Use   • Vaping Use: Never used   Substance and Sexual Activity   • Alcohol use: Yes     Comment: ONE BEER ONCE MONTH   • Drug use: No   • Sexual activity: Defer       Family History   Problem Relation Age of Onset   • Lung cancer Father    • Malig Hyperthermia Neg Hx        Review of Systems   Constitutional: Negative.   Cardiovascular: Negative.  Negative for chest pain, dyspnea on exertion, leg swelling, orthopnea, paroxysmal nocturnal dyspnea and syncope.   Respiratory: Negative.    Hematologic/Lymphatic: Negative for bleeding problem.   Musculoskeletal: Negative for falls.   Gastrointestinal: Negative for melena.   Neurological: Negative for dizziness and light-headedness.       Allergies   Allergen Reactions   • Simvastatin Myalgia     Other reaction(s): Muscle pain         Current Outpatient Medications:   •  atorvastatin (LIPITOR) 20 MG tablet, Take 20 mg by mouth Every Night., Disp: ,  "Rfl:   •  Cholecalciferol (VITAMIN D3 PO), Take 3 tablets by mouth Daily. HOLD FOR SURGERY, Disp: , Rfl:   •  Multiple Vitamins-Minerals (CENTRUM SILVER ADULT 50+ PO), Take 1 tablet by mouth Daily. HOLD FOR SURGERY, Disp: , Rfl:   •  mupirocin (BACTROBAN) 2 % ointment, Apply  topically to the appropriate area as directed 3 (Three) Times a Day. AS DIRECTED PAT, Disp: , Rfl:   •  sildenafil (VIAGRA) 25 MG tablet, Take 25 mg by mouth Daily As Needed for Erectile Dysfunction. HOLD FOR SURGERY, Disp: , Rfl:       Objective:     Vitals:    01/10/22 0905   BP: 120/60   Pulse: 59   Weight: 72.1 kg (159 lb)   Height: 170.2 cm (67\")     Body mass index is 24.9 kg/m².    PHYSICAL EXAM:    Neck:      Vascular: No JVD.   Pulmonary:      Effort: Pulmonary effort is normal.      Breath sounds: Normal breath sounds.   Cardiovascular:      Normal rate. Regular rhythm.      Murmurs: There is a harsh midsystolic murmur at the URSB, radiating to the neck.      No gallop. No click. No rub.   Pulses:     Intact distal pulses.           ECG 12 Lead    Date/Time: 1/10/2022 9:14 AM  Performed by: Raiza Solorzano APRN  Authorized by: Raiza Solorzano APRN   Comparison: compared with previous ECG from 11/12/2021  Similar to previous ECG  Rhythm: sinus rhythm  Rate: normal  BPM: 59    Clinical impression: normal ECG  Comments: Indication: Aortic stenosis              Assessment:       Diagnosis Plan   1. Aortic valve stenosis, etiology of cardiac valve disease unspecified  ECG 12 Lead   2. History of atrial fibrillation     3. Hyperlipidemia LDL goal <100       Orders Placed This Encounter   Procedures   • ECG 12 Lead     This order was created via procedure documentation     Order Specific Question:   Release to patient     Answer:   Immediate          Plan:       1. Aortic stenosis.  Echocardiogram from July 2021 demonstrated a mean pressure gradient of 34 mmHg, maximum pressure gradient of 67 mmHg, and peak velocity of 410.  Dr." Mariano recommended a repeat echocardiogram in 1 year with clinical follow-ups every 6 months.      2.   History of atrial fibrillation.  In the presence of an acute infection.  There has been no recurrence.  Therefore, he is not on anticoagulation.      3.   Hyperlipidemia.  Continue atorvastatin.      I think he is doing well.  He denies any symptoms of angina or heart failure.  There is no indication to repeat the echocardiogram prior to his colonoscopy.  He is at a reasonable risk to proceed with that procedure.  I am not making any changes today.  He will follow-up with Dr. Quintana in 6 months with a repeat echocardiogram at that time.      As always, it has been a pleasure to participate in your patient's care.      Sincerely,         KAITY Escamilla

## 2022-03-04 ENCOUNTER — TRANSCRIBE ORDERS (OUTPATIENT)
Dept: ADMINISTRATIVE | Facility: HOSPITAL | Age: 72
End: 2022-03-04

## 2022-03-04 DIAGNOSIS — M25.552 LEFT HIP PAIN: Primary | ICD-10-CM

## 2022-04-11 ENCOUNTER — HOSPITAL ENCOUNTER (OUTPATIENT)
Dept: MRI IMAGING | Facility: HOSPITAL | Age: 72
Discharge: HOME | End: 2022-04-11
Admitting: ORTHOPAEDIC SURGERY

## 2022-04-11 DIAGNOSIS — M25.552 LEFT HIP PAIN: ICD-10-CM

## 2022-04-11 PROCEDURE — 0 GADOBENATE DIMEGLUMINE 529 MG/ML SOLUTION: Performed by: ORTHOPAEDIC SURGERY

## 2022-04-11 PROCEDURE — 73723 MRI JOINT LWR EXTR W/O&W/DYE: CPT

## 2022-04-11 PROCEDURE — 82565 ASSAY OF CREATININE: CPT

## 2022-04-11 PROCEDURE — A9577 INJ MULTIHANCE: HCPCS | Performed by: ORTHOPAEDIC SURGERY

## 2022-04-11 RX ADMIN — GADOBENATE DIMEGLUMINE 15 ML: 529 INJECTION, SOLUTION INTRAVENOUS at 20:25

## 2022-04-13 LAB — CREAT BLDA-MCNC: 0.7 MG/DL (ref 0.6–1.3)

## 2022-07-21 ENCOUNTER — LAB (OUTPATIENT)
Dept: LAB | Facility: HOSPITAL | Age: 72
End: 2022-07-21

## 2022-07-21 ENCOUNTER — TRANSCRIBE ORDERS (OUTPATIENT)
Dept: CARDIOLOGY | Facility: CLINIC | Age: 72
End: 2022-07-21

## 2022-07-21 ENCOUNTER — HOSPITAL ENCOUNTER (OUTPATIENT)
Dept: CARDIOLOGY | Facility: HOSPITAL | Age: 72
Discharge: HOME | End: 2022-07-21

## 2022-07-21 ENCOUNTER — OFFICE VISIT (OUTPATIENT)
Dept: CARDIOLOGY | Facility: CLINIC | Age: 72
End: 2022-07-21

## 2022-07-21 VITALS
BODY MASS INDEX: 25.65 KG/M2 | HEART RATE: 58 BPM | DIASTOLIC BLOOD PRESSURE: 66 MMHG | HEIGHT: 67 IN | SYSTOLIC BLOOD PRESSURE: 128 MMHG | WEIGHT: 163.4 LBS

## 2022-07-21 VITALS
BODY MASS INDEX: 24.96 KG/M2 | HEART RATE: 64 BPM | WEIGHT: 159 LBS | HEIGHT: 67 IN | DIASTOLIC BLOOD PRESSURE: 64 MMHG | SYSTOLIC BLOOD PRESSURE: 116 MMHG

## 2022-07-21 DIAGNOSIS — Z01.810 PRE-OPERATIVE CARDIOVASCULAR EXAMINATION: ICD-10-CM

## 2022-07-21 DIAGNOSIS — I35.0 AORTIC VALVE STENOSIS, ETIOLOGY OF CARDIAC VALVE DISEASE UNSPECIFIED: ICD-10-CM

## 2022-07-21 DIAGNOSIS — Z13.6 SCREENING FOR ISCHEMIC HEART DISEASE: ICD-10-CM

## 2022-07-21 DIAGNOSIS — Z86.79 HISTORY OF ATRIAL FIBRILLATION: ICD-10-CM

## 2022-07-21 DIAGNOSIS — E78.5 HYPERLIPIDEMIA LDL GOAL <100: ICD-10-CM

## 2022-07-21 DIAGNOSIS — Z01.818 OTHER SPECIFIED PRE-OPERATIVE EXAMINATION: ICD-10-CM

## 2022-07-21 DIAGNOSIS — I35.0 AORTIC STENOSIS, SEVERE: Primary | ICD-10-CM

## 2022-07-21 DIAGNOSIS — Z01.810 PRE-OPERATIVE CARDIOVASCULAR EXAMINATION: Primary | ICD-10-CM

## 2022-07-21 LAB
ANION GAP SERPL CALCULATED.3IONS-SCNC: 11.1 MMOL/L (ref 5–15)
AORTIC ARCH: 2.6 CM
AORTIC DIMENSIONLESS INDEX: 0.2 (DI)
ASCENDING AORTA: 2.8 CM
BASOPHILS # BLD AUTO: 0.02 10*3/MM3 (ref 0–0.2)
BASOPHILS NFR BLD AUTO: 0.3 % (ref 0–1.5)
BH CV ECHO MEAS - ACS: 0.88 CM
BH CV ECHO MEAS - AI P1/2T: 557 MSEC
BH CV ECHO MEAS - AO MAX PG: 97 MMHG
BH CV ECHO MEAS - AO MEAN PG: 49.3 MMHG
BH CV ECHO MEAS - AO ROOT DIAM: 3 CM
BH CV ECHO MEAS - AO V2 MAX: 492.5 CM/SEC
BH CV ECHO MEAS - AO V2 VTI: 130.6 CM
BH CV ECHO MEAS - AVA(I,D): 0.46 CM2
BH CV ECHO MEAS - EDV(CUBED): 67.3 ML
BH CV ECHO MEAS - EDV(MOD-SP2): 99 ML
BH CV ECHO MEAS - EDV(MOD-SP4): 94 ML
BH CV ECHO MEAS - EF(MOD-BP): 64.3 %
BH CV ECHO MEAS - EF(MOD-SP2): 63.6 %
BH CV ECHO MEAS - EF(MOD-SP4): 66 %
BH CV ECHO MEAS - ESV(CUBED): 20.9 ML
BH CV ECHO MEAS - ESV(MOD-SP2): 36 ML
BH CV ECHO MEAS - ESV(MOD-SP4): 32 ML
BH CV ECHO MEAS - FS: 32.3 %
BH CV ECHO MEAS - IVS/LVPW: 1.08 CM
BH CV ECHO MEAS - IVSD: 1.29 CM
BH CV ECHO MEAS - LAT PEAK E' VEL: 9.5 CM/SEC
BH CV ECHO MEAS - LV DIASTOLIC VOL/BSA (35-75): 51.2 CM2
BH CV ECHO MEAS - LV MASS(C)D: 180.2 GRAMS
BH CV ECHO MEAS - LV MAX PG: 2.8 MMHG
BH CV ECHO MEAS - LV MEAN PG: 1.54 MMHG
BH CV ECHO MEAS - LV SYSTOLIC VOL/BSA (12-30): 17.4 CM2
BH CV ECHO MEAS - LV V1 MAX: 84 CM/SEC
BH CV ECHO MEAS - LV V1 VTI: 19.6 CM
BH CV ECHO MEAS - LVIDD: 4.1 CM
BH CV ECHO MEAS - LVIDS: 2.8 CM
BH CV ECHO MEAS - LVOT AREA: 3 CM2
BH CV ECHO MEAS - LVOT DIAM: 1.97 CM
BH CV ECHO MEAS - LVPWD: 1.2 CM
BH CV ECHO MEAS - MED PEAK E' VEL: 6.2 CM/SEC
BH CV ECHO MEAS - MR MAX PG: 136.8 MMHG
BH CV ECHO MEAS - MR MAX VEL: 584.9 CM/SEC
BH CV ECHO MEAS - MV A DUR: 0.13 SEC
BH CV ECHO MEAS - MV A MAX VEL: 95.1 CM/SEC
BH CV ECHO MEAS - MV DEC SLOPE: 176 CM/SEC2
BH CV ECHO MEAS - MV DEC TIME: 0.36 MSEC
BH CV ECHO MEAS - MV E MAX VEL: 70.7 CM/SEC
BH CV ECHO MEAS - MV E/A: 0.74
BH CV ECHO MEAS - MV MAX PG: 3.9 MMHG
BH CV ECHO MEAS - MV MEAN PG: 2.13 MMHG
BH CV ECHO MEAS - MV P1/2T: 158.6 MSEC
BH CV ECHO MEAS - MV V2 VTI: 43.2 CM
BH CV ECHO MEAS - MVA(P1/2T): 1.39 CM2
BH CV ECHO MEAS - MVA(VTI): 1.38 CM2
BH CV ECHO MEAS - PA ACC TIME: 0.15 SEC
BH CV ECHO MEAS - PA PR(ACCEL): 12.5 MMHG
BH CV ECHO MEAS - PA V2 MAX: 90.9 CM/SEC
BH CV ECHO MEAS - PULM A REVS DUR: 0.13 SEC
BH CV ECHO MEAS - PULM A REVS VEL: 26.7 CM/SEC
BH CV ECHO MEAS - PULM DIAS VEL: 42.4 CM/SEC
BH CV ECHO MEAS - PULM S/D: 1.49
BH CV ECHO MEAS - PULM SYS VEL: 63.3 CM/SEC
BH CV ECHO MEAS - QP/QS: 0.79
BH CV ECHO MEAS - RAP SYSTOLE: 15 MMHG
BH CV ECHO MEAS - RV MAX PG: 1.54 MMHG
BH CV ECHO MEAS - RV V1 MAX: 62.1 CM/SEC
BH CV ECHO MEAS - RV V1 VTI: 14.8 CM
BH CV ECHO MEAS - RVOT DIAM: 2.01 CM
BH CV ECHO MEAS - RVSP: 40 MMHG
BH CV ECHO MEAS - SI(MOD-SP2): 34.3 ML/M2
BH CV ECHO MEAS - SI(MOD-SP4): 33.8 ML/M2
BH CV ECHO MEAS - SUP REN AO DIAM: 2.1 CM
BH CV ECHO MEAS - SV(LVOT): 59.5 ML
BH CV ECHO MEAS - SV(MOD-SP2): 63 ML
BH CV ECHO MEAS - SV(MOD-SP4): 62 ML
BH CV ECHO MEAS - SV(RVOT): 47 ML
BH CV ECHO MEAS - TAPSE (>1.6): 2.6 CM
BH CV ECHO MEAS - TR MAX PG: 33.8 MMHG
BH CV ECHO MEAS - TR MAX VEL: 254 CM/SEC
BH CV ECHO MEASUREMENTS AVERAGE E/E' RATIO: 9.01
BH CV XLRA - RV BASE: 2.7 CM
BH CV XLRA - RV LENGTH: 7.5 CM
BH CV XLRA - RV MID: 2.02 CM
BH CV XLRA - TDI S': 13.8 CM/SEC
BUN SERPL-MCNC: 13 MG/DL (ref 8–23)
BUN/CREAT SERPL: 18.3 (ref 7–25)
CALCIUM SPEC-SCNC: 9.4 MG/DL (ref 8.6–10.5)
CHLORIDE SERPL-SCNC: 104 MMOL/L (ref 98–107)
CO2 SERPL-SCNC: 27.9 MMOL/L (ref 22–29)
CREAT SERPL-MCNC: 0.71 MG/DL (ref 0.76–1.27)
DEPRECATED RDW RBC AUTO: 41.8 FL (ref 37–54)
EGFRCR SERPLBLD CKD-EPI 2021: 97.5 ML/MIN/1.73
EOSINOPHIL # BLD AUTO: 0.12 10*3/MM3 (ref 0–0.4)
EOSINOPHIL NFR BLD AUTO: 1.9 % (ref 0.3–6.2)
ERYTHROCYTE [DISTWIDTH] IN BLOOD BY AUTOMATED COUNT: 13.3 % (ref 12.3–15.4)
GLUCOSE SERPL-MCNC: 93 MG/DL (ref 65–99)
HCT VFR BLD AUTO: 41.8 % (ref 37.5–51)
HGB BLD-MCNC: 13.6 G/DL (ref 13–17.7)
IMM GRANULOCYTES # BLD AUTO: 0.01 10*3/MM3 (ref 0–0.05)
IMM GRANULOCYTES NFR BLD AUTO: 0.2 % (ref 0–0.5)
LEFT ATRIUM VOLUME INDEX: 26.3 ML/M2
LYMPHOCYTES # BLD AUTO: 1.68 10*3/MM3 (ref 0.7–3.1)
LYMPHOCYTES NFR BLD AUTO: 26 % (ref 19.6–45.3)
MAXIMAL PREDICTED HEART RATE: 148 BPM
MCH RBC QN AUTO: 28.2 PG (ref 26.6–33)
MCHC RBC AUTO-ENTMCNC: 32.5 G/DL (ref 31.5–35.7)
MCV RBC AUTO: 86.5 FL (ref 79–97)
MONOCYTES # BLD AUTO: 0.57 10*3/MM3 (ref 0.1–0.9)
MONOCYTES NFR BLD AUTO: 8.8 % (ref 5–12)
NEUTROPHILS NFR BLD AUTO: 4.07 10*3/MM3 (ref 1.7–7)
NEUTROPHILS NFR BLD AUTO: 62.8 % (ref 42.7–76)
NRBC BLD AUTO-RTO: 0 /100 WBC (ref 0–0.2)
PLATELET # BLD AUTO: 155 10*3/MM3 (ref 140–450)
PMV BLD AUTO: 11.9 FL (ref 6–12)
POTASSIUM SERPL-SCNC: 4.2 MMOL/L (ref 3.5–5.2)
RBC # BLD AUTO: 4.83 10*6/MM3 (ref 4.14–5.8)
SINUS: 2.7 CM
SODIUM SERPL-SCNC: 143 MMOL/L (ref 136–145)
STJ: 2.8 CM
STRESS TARGET HR: 126 BPM
WBC NRBC COR # BLD: 6.47 10*3/MM3 (ref 3.4–10.8)

## 2022-07-21 PROCEDURE — 80048 BASIC METABOLIC PNL TOTAL CA: CPT

## 2022-07-21 PROCEDURE — 99214 OFFICE O/P EST MOD 30 MIN: CPT | Performed by: INTERNAL MEDICINE

## 2022-07-21 PROCEDURE — 93306 TTE W/DOPPLER COMPLETE: CPT

## 2022-07-21 PROCEDURE — 36415 COLL VENOUS BLD VENIPUNCTURE: CPT

## 2022-07-21 PROCEDURE — 93000 ELECTROCARDIOGRAM COMPLETE: CPT | Performed by: INTERNAL MEDICINE

## 2022-07-21 PROCEDURE — 85025 COMPLETE CBC W/AUTO DIFF WBC: CPT

## 2022-07-21 PROCEDURE — 93306 TTE W/DOPPLER COMPLETE: CPT | Performed by: INTERNAL MEDICINE

## 2022-07-21 NOTE — H&P (VIEW-ONLY)
Date of Office Visit: 01/10/2022  Encounter Provider: Master Quintana MD  Place of Service: [facility] CARDIOLOGY  Patient Name: Hieu Mann  :1950    Chief Complaint   Patient presents with   • Aortic Stenosis   :     HPI:   Hieu Mann is a 71 y.o. male with paroxysmal atrial fibrillation (in the presence of [place] spotted fever) and aortic valve stenosis.  There has been no recurrence of atrial fibrillation since his infection.  I last saw him in .  He was doing very well at that time and subsequently underwent a transthoracic echocardiogram with a mean gradient of 34 mmHg and a peak velocity of 4.1 m/s.  At that time he was felt to have asymptomatic severe degenerative aortic valve stenosis and we continue to monitor.  He has stayed very active and continues to workout.  He does state that he occasionally will have shortness of air when walking up a flight of stairs that resolves within 5 minutes.  He has no chest pain, orthopnea or PND.  His repeat transthoracic echocardiogram today looks to be near critical aortic valve stenosis with a mean gradient across the aortic valve 49 mmHg and a peak velocity that is nearing 5 m/s.  His valve is extremely restricted.  His ejection fraction looks normal.    Past Medical History:   Diagnosis Date   • Aortic valve sclerosis    • Aortic valve stenosis    • Arthritis    • Elevated cholesterol    • Epigastric hernia    • Heart murmur     NO TREATMENT   • History of atrial fibrillation    • History of irregular heartbeat     A-FIB   • History of [place] spotted fever    • Hyperlipidemia    • Swelling     RIGHT HIP AREA       Past Surgical History:   Procedure Laterality Date   • CATARACT EXTRACTION WITH INTRAOCULAR LENS IMPLANT Bilateral    • COLONOSCOPY N/A     DR. HONEYCUTT   • EPIGASTRIC HERNIA REPAIR N/A 3/9/2018    Procedure: EPIGASTRIC HERNIA REPAIR;  Surgeon: David Finn MD;  [facility]: [facility];  Service: General   • LAPAROSCOPIC INGUINAL HERNIA REPAIR Left 03/27/2015    Dr. Hieu Khan   • REPLACEMENT TOTAL KNEE Left 08/06/2012    Dr. David Stein   • REPLACEMENT TOTAL KNEE Right 11/12/2003    Right total knee replacement, cementless Hannah Profix-Dr. David Stein   • TOTAL HIP ARTHROPLASTY Right 05/11/2009    Dr. David Stein   • TOTAL HIP ARTHROPLASTY Left 02/24/2011    Dr. David Stein   • TOTAL HIP ARTHROPLASTY Right 12/15/2020    Procedure: RT. TOTAL HIP ARTHROPLASTY;  Surgeon: David Stein II, MD;  Location: [facility] OR;  Service: Orthopedics;  Laterality: Right;       Social History     Socioeconomic History   • Marital status:    Tobacco Use   • Smoking status: Never Smoker   • Smokeless tobacco: Never Used   • Tobacco comment: CAFFEINE USE: 1 CUP COFFEE DAILY   Vaping Use   • Vaping Use: Never used   Substance and Sexual Activity   • Alcohol use: Yes     Comment: ONE BEER ONCE MONTH   • Drug use: No   • Sexual activity: Defer       Family History   Problem Relation Age of Onset   • Lung cancer Father    • Malig Hyperthermia Neg Hx        Review of Systems   Constitutional: Negative.   Cardiovascular: Negative.  Negative for chest pain, dyspnea on exertion, leg swelling, orthopnea, paroxysmal nocturnal dyspnea and syncope.   Respiratory: Negative.    Hematologic/Lymphatic: Negative for bleeding problem.   Musculoskeletal: Negative for falls.   Gastrointestinal: Negative for melena.   Neurological: Negative for dizziness and light-headedness.       Allergies   Allergen Reactions   • Simvastatin Myalgia     Other reaction(s): Muscle pain         Current Outpatient Medications:   •  atorvastatin (LIPITOR) 20 MG tablet, Take 20 mg by mouth Every Night., Disp: , Rfl:   •  Cholecalciferol (VITAMIN D3 PO), Take 3 tablets by mouth Daily. HOLD FOR SURGERY, Disp: , Rfl:   •  Multiple Vitamins-Minerals (CENTRUM SILVER ADULT 50+ PO), Take 1 tablet by mouth Daily. HOLD FOR  "SURGERY, Disp: , Rfl:   •  sildenafil (VIAGRA) 25 MG tablet, Take 25 mg by mouth Daily As Needed for Erectile Dysfunction. HOLD FOR SURGERY, Disp: , Rfl:   •  mupirocin (BACTROBAN) 2 % ointment, Apply  topically to the appropriate area as directed 3 (Three) Times a Day. AS DIRECTED PAT, Disp: , Rfl:       Objective:     Vitals:    07/21/22 0822   BP: 128/66   Pulse: 58   Weight: 74.1 kg (163 lb 6.4 oz)   Height: 170.2 cm (67\")     Body mass index is 25.59 kg/m².    PHYSICAL EXAM:    Constitutional:       Appearance: Well-developed.   Eyes:      General: No scleral icterus.     Conjunctiva/sclera: Conjunctivae normal.   HENT:      Head: Normocephalic and atraumatic.   Neck:      Thyroid: No thyromegaly.      Vascular: Normal carotid pulses. No carotid bruit, hepatojugular reflux or JVD.      Trachea: No tracheal deviation.   Pulmonary:      Effort: Pulmonary effort is normal. No respiratory distress.      Breath sounds: Normal breath sounds.   Chest:      Chest wall: Not tender to palpatation.   Cardiovascular:      Normal rate. Regular rhythm.      Murmurs: There is a mid to late systolic murmur at the URSB, radiating to the neck.      No gallop. No click. No rub.   Abdominal:      General: Bowel sounds are normal. There is no distension.      Palpations: Abdomen is soft.      Tenderness: There is no abdominal tenderness.   Musculoskeletal:         General: No deformity.      Cervical back: Normal range of motion and neck supple. Skin:     Findings: No erythema or rash.   Neurological:      Mental Status: Alert and oriented to person, place, and time.      Sensory: No sensory deficit.   Psychiatric:         Behavior: Behavior normal.           ECG 12 Lead    Date/Time: 7/21/2022 9:18 AM  Performed by: Master Quintana MD  Authorized by: Master Quintana MD   Comparison: compared with previous ECG from 1/10/2022  Similar to previous ECG  Rhythm: sinus rhythm              Assessment:       Diagnosis Plan "   1. Aortic stenosis, severe  Case Request Cath Lab: Left Heart Cath     No orders of the defined types were placed in this encounter.         Plan:   Very pleasant 71-year-old with a medical history of severe degenerative aortic valve stenosis, paroxysmal atrial fibrillation, hyperlipidemia, presents back to me in follow-up.  He is doing well and his only complaint at this point in time is mild shortness of breath with walking upstairs.  Unfortunately his valve appears to have progressed to near critical range.  His ejection fraction is still preserved.  I do think at this point in time is time to move forward with intervention on his aortic valve.      1. Aortic stenosis.  Critical degenerative aortic valve stenosis   -Echocardiogram images and report from today have been reviewed.  I think it is time to move forward with intervention of this valve is is progressed to near critical.   -Catheterization has been ordered and we will start out with that and then surgical evaluation.      2.   History of atrial fibrillation.  In the presence of an acute infection.  There has been no recurrence.  Therefore, he is not on anticoagulation.      3.   Hyperlipidemia.  Continue atorvastatin.  No myalgias or elevated LFTs.

## 2022-07-21 NOTE — PROGRESS NOTES
Date of Office Visit: 01/10/2022  Encounter Provider: Master Quintana MD  Place of Service: [facility] CARDIOLOGY  Patient Name: Hieu Mann  :1950    Chief Complaint   Patient presents with   • Aortic Stenosis   :     HPI:   Hieu Mann is a 71 y.o. male with paroxysmal atrial fibrillation (in the presence of [place] spotted fever) and aortic valve stenosis.  There has been no recurrence of atrial fibrillation since his infection.  I last saw him in .  He was doing very well at that time and subsequently underwent a transthoracic echocardiogram with a mean gradient of 34 mmHg and a peak velocity of 4.1 m/s.  At that time he was felt to have asymptomatic severe degenerative aortic valve stenosis and we continue to monitor.  He has stayed very active and continues to workout.  He does state that he occasionally will have shortness of air when walking up a flight of stairs that resolves within 5 minutes.  He has no chest pain, orthopnea or PND.  His repeat transthoracic echocardiogram today looks to be near critical aortic valve stenosis with a mean gradient across the aortic valve 49 mmHg and a peak velocity that is nearing 5 m/s.  His valve is extremely restricted.  His ejection fraction looks normal.    Past Medical History:   Diagnosis Date   • Aortic valve sclerosis    • Aortic valve stenosis    • Arthritis    • Elevated cholesterol    • Epigastric hernia    • Heart murmur     NO TREATMENT   • History of atrial fibrillation    • History of irregular heartbeat     A-FIB   • History of [place] spotted fever    • Hyperlipidemia    • Swelling     RIGHT HIP AREA       Past Surgical History:   Procedure Laterality Date   • CATARACT EXTRACTION WITH INTRAOCULAR LENS IMPLANT Bilateral    • COLONOSCOPY N/A     DR. HONEYCUTT   • EPIGASTRIC HERNIA REPAIR N/A 3/9/2018    Procedure: EPIGASTRIC HERNIA REPAIR;  Surgeon: David Finn MD;  Location: [facility];  Service: General   • LAPAROSCOPIC INGUINAL HERNIA REPAIR Left 03/27/2015    Dr. Hieu Khan   • REPLACEMENT TOTAL KNEE Left 08/06/2012    Dr. David Stein   • REPLACEMENT TOTAL KNEE Right 11/12/2003    Right total knee replacement, cementless Hannah [employer]fix-Dr. David Stein   • TOTAL HIP ARTHROPLASTY Right 05/11/2009    Dr. David Stein   • TOTAL HIP ARTHROPLASTY Left 02/24/2011    Dr. David Stein   • TOTAL HIP ARTHROPLASTY Right 12/15/2020    Procedure: RT. TOTAL HIP ARTHROPLASTY;  Surgeon: David Stein II, MD;  Location: [facility] OR;  Service: Orthopedics;  Laterality: Right;       Social History     Socioeconomic History   • Marital status:    Tobacco Use   • Smoking status: Never Smoker   • Smokeless tobacco: Never Used   • Tobacco comment: CAFFEINE USE: 1 CUP COFFEE DAILY   Vaping Use   • Vaping Use: Never used   Substance and Sexual Activity   • Alcohol use: Yes     Comment: ONE BEER ONCE MONTH   • Drug use: No   • Sexual activity: Defer       Family History   Problem Relation Age of Onset   • Lung cancer Father    • Malig Hyperthermia Neg Hx        Review of Systems   Constitutional: Negative.   Cardiovascular: Negative.  Negative for chest pain, dyspnea on exertion, leg swelling, orthopnea, paroxysmal nocturnal dyspnea and syncope.   Respiratory: Negative.    Hematologic/Lymphatic: Negative for bleeding problem.   Musculoskeletal: Negative for falls.   Gastrointestinal: Negative for melena.   Neurological: Negative for dizziness and light-headedness.       Allergies   Allergen Reactions   • Simvastatin Myalgia     Other reaction(s): Muscle pain         Current Outpatient Medications:   •  atorvastatin (LIPITOR) 20 MG tablet, Take 20 mg by mouth Every Night., Disp: , Rfl:   •  Cholecalciferol (VITAMIN D3 PO), Take 3 tablets by mouth Daily. HOLD FOR SURGERY, Disp: , Rfl:   •  Multiple Vitamins-Minerals (CENTRUM SILVER ADULT 50+ PO), Take 1 tablet by mouth Daily. HOLD FOR  "SURGERY, Disp: , Rfl:   •  sildenafil (VIAGRA) 25 MG tablet, Take 25 mg by mouth Daily As Needed for Erectile Dysfunction. HOLD FOR SURGERY, Disp: , Rfl:   •  mupirocin (BACTROBAN) 2 % ointment, Apply  topically to the appropriate area as directed 3 (Three) Times a Day. AS DIRECTED PAT, Disp: , Rfl:       Objective:     Vitals:    07/21/22 0822   BP: 128/66   Pulse: 58   Weight: 74.1 kg (163 lb 6.4 oz)   Height: 170.2 cm (67\")     Body mass index is 25.59 kg/m².    PHYSICAL EXAM:    Constitutional:       Appearance: Well-developed.   Eyes:      General: No scleral icterus.     Conjunctiva/sclera: Conjunctivae normal.   HENT:      Head: Normocephalic and atraumatic.   Neck:      Thyroid: No thyromegaly.      Vascular: Normal carotid pulses. No carotid bruit, hepatojugular reflux or JVD.      Trachea: No tracheal deviation.   Pulmonary:      Effort: Pulmonary effort is normal. No respiratory distress.      Breath sounds: Normal breath sounds.   Chest:      Chest wall: Not tender to palpatation.   Cardiovascular:      Normal rate. Regular rhythm.      Murmurs: There is a mid to late systolic murmur at the URSB, radiating to the neck.      No gallop. No click. No rub.   Abdominal:      General: Bowel sounds are normal. There is no distension.      Palpations: Abdomen is soft.      Tenderness: There is no abdominal tenderness.   Musculoskeletal:         General: No deformity.      Cervical back: Normal range of motion and neck supple. Skin:     Findings: No erythema or rash.   Neurological:      Mental Status: Alert and oriented to person, place, and time.      Sensory: No sensory deficit.   Psychiatric:         Behavior: Behavior normal.           ECG 12 Lead    Date/Time: 7/21/2022 9:18 AM  Performed by: Master Quintana MD  Authorized by: Master Quintana MD   Comparison: compared with previous ECG from 1/10/2022  Similar to previous ECG  Rhythm: sinus rhythm              Assessment:       Diagnosis Plan "   1. Aortic stenosis, severe  Case Request Cath Lab: Left Heart Cath     No orders of the defined types were placed in this encounter.         Plan:   Very pleasant 71-year-old with a medical history of severe degenerative aortic valve stenosis, paroxysmal atrial fibrillation, hyperlipidemia, presents back to me in follow-up.  He is doing well and his only complaint at this point in time is mild shortness of breath with walking upstairs.  Unfortunately his valve appears to have progressed to near critical range.  His ejection fraction is still preserved.  I do think at this point in time is time to move forward with intervention on his aortic valve.      1. Aortic stenosis.  Critical degenerative aortic valve stenosis   -Echocardiogram images and report from today have been reviewed.  I think it is time to move forward with intervention of this valve is is progressed to near critical.   -Catheterization has been ordered and we will start out with that and then surgical evaluation.      2.   History of atrial fibrillation.  In the presence of an acute infection.  There has been no recurrence.  Therefore, he is not on anticoagulation.      3.   Hyperlipidemia.  Continue atorvastatin.  No myalgias or elevated LFTs.

## 2022-07-23 ENCOUNTER — LAB (OUTPATIENT)
Dept: LAB | Facility: HOSPITAL | Age: 72
End: 2022-07-23

## 2022-07-23 DIAGNOSIS — Z01.810 PRE-OPERATIVE CARDIOVASCULAR EXAMINATION: ICD-10-CM

## 2022-07-23 DIAGNOSIS — Z13.6 SCREENING FOR ISCHEMIC HEART DISEASE: ICD-10-CM

## 2022-07-23 DIAGNOSIS — Z01.818 OTHER SPECIFIED PRE-OPERATIVE EXAMINATION: ICD-10-CM

## 2022-07-23 LAB — SARS-COV-2 ORF1AB RESP QL NAA+PROBE: NOT DETECTED

## 2022-07-23 PROCEDURE — U0005 INFEC AGEN DETEC AMPLI PROBE: HCPCS

## 2022-07-23 PROCEDURE — U0004 COV-19 TEST NON-CDC HGH THRU: HCPCS

## 2022-07-23 PROCEDURE — C9803 HOPD COVID-19 SPEC COLLECT: HCPCS

## 2022-07-26 ENCOUNTER — HOSPITAL ENCOUNTER (OUTPATIENT)
Facility: HOSPITAL | Age: 72
Setting detail: HOSPITAL OUTPATIENT SURGERY
Discharge: HOME | End: 2022-07-26
Attending: INTERNAL MEDICINE | Admitting: INTERNAL MEDICINE

## 2022-07-26 VITALS
OXYGEN SATURATION: 98 % | BODY MASS INDEX: 24.64 KG/M2 | SYSTOLIC BLOOD PRESSURE: 128 MMHG | RESPIRATION RATE: 16 BRPM | TEMPERATURE: 97.9 F | DIASTOLIC BLOOD PRESSURE: 74 MMHG | HEART RATE: 52 BPM | HEIGHT: 67 IN | WEIGHT: 157 LBS

## 2022-07-26 DIAGNOSIS — I35.0 AORTIC STENOSIS, SEVERE: ICD-10-CM

## 2022-07-26 PROCEDURE — 99204 OFFICE O/P NEW MOD 45 MIN: CPT | Performed by: THORACIC SURGERY (CARDIOTHORACIC VASCULAR SURGERY)

## 2022-07-26 PROCEDURE — 25010000002 FENTANYL CITRATE (PF) 50 MCG/ML SOLUTION: Performed by: INTERNAL MEDICINE

## 2022-07-26 PROCEDURE — 93454 CORONARY ARTERY ANGIO S&I: CPT | Performed by: INTERNAL MEDICINE

## 2022-07-26 PROCEDURE — C1894 INTRO/SHEATH, NON-LASER: HCPCS | Performed by: INTERNAL MEDICINE

## 2022-07-26 PROCEDURE — 99152 MOD SED SAME PHYS/QHP 5/>YRS: CPT | Performed by: INTERNAL MEDICINE

## 2022-07-26 PROCEDURE — 0 IOPAMIDOL PER 1 ML: Performed by: INTERNAL MEDICINE

## 2022-07-26 PROCEDURE — C1769 GUIDE WIRE: HCPCS | Performed by: INTERNAL MEDICINE

## 2022-07-26 PROCEDURE — 25010000002 MIDAZOLAM PER 1 MG: Performed by: INTERNAL MEDICINE

## 2022-07-26 PROCEDURE — 25010000002 HEPARIN (PORCINE) PER 1000 UNITS: Performed by: INTERNAL MEDICINE

## 2022-07-26 RX ORDER — SODIUM CHLORIDE 9 MG/ML
75 INJECTION, SOLUTION INTRAVENOUS CONTINUOUS
Status: DISCONTINUED | OUTPATIENT
Start: 2022-07-26 | End: 2022-07-26 | Stop reason: HOSPADM

## 2022-07-26 RX ORDER — ONDANSETRON 2 MG/ML
4 INJECTION INTRAMUSCULAR; INTRAVENOUS EVERY 6 HOURS PRN
Status: DISCONTINUED | OUTPATIENT
Start: 2022-07-26 | End: 2022-07-26 | Stop reason: HOSPADM

## 2022-07-26 RX ORDER — LIDOCAINE HYDROCHLORIDE 20 MG/ML
INJECTION, SOLUTION INFILTRATION; PERINEURAL AS NEEDED
Status: DISCONTINUED | OUTPATIENT
Start: 2022-07-26 | End: 2022-07-26 | Stop reason: HOSPADM

## 2022-07-26 RX ORDER — SODIUM CHLORIDE 0.9 % (FLUSH) 0.9 %
10 SYRINGE (ML) INJECTION EVERY 12 HOURS SCHEDULED
Status: DISCONTINUED | OUTPATIENT
Start: 2022-07-26 | End: 2022-07-26 | Stop reason: HOSPADM

## 2022-07-26 RX ORDER — HYDROCODONE BITARTRATE AND ACETAMINOPHEN 5; 325 MG/1; MG/1
1 TABLET ORAL EVERY 4 HOURS PRN
Status: DISCONTINUED | OUTPATIENT
Start: 2022-07-26 | End: 2022-07-26 | Stop reason: HOSPADM

## 2022-07-26 RX ORDER — MIDAZOLAM HYDROCHLORIDE 1 MG/ML
INJECTION INTRAMUSCULAR; INTRAVENOUS AS NEEDED
Status: DISCONTINUED | OUTPATIENT
Start: 2022-07-26 | End: 2022-07-26 | Stop reason: HOSPADM

## 2022-07-26 RX ORDER — SODIUM CHLORIDE 9 MG/ML
50 INJECTION, SOLUTION INTRAVENOUS CONTINUOUS
Status: DISCONTINUED | OUTPATIENT
Start: 2022-07-26 | End: 2022-07-26 | Stop reason: HOSPADM

## 2022-07-26 RX ORDER — FENTANYL CITRATE 50 UG/ML
INJECTION, SOLUTION INTRAMUSCULAR; INTRAVENOUS AS NEEDED
Status: DISCONTINUED | OUTPATIENT
Start: 2022-07-26 | End: 2022-07-26 | Stop reason: HOSPADM

## 2022-07-26 RX ORDER — ONDANSETRON 4 MG/1
4 TABLET, FILM COATED ORAL EVERY 6 HOURS PRN
Status: DISCONTINUED | OUTPATIENT
Start: 2022-07-26 | End: 2022-07-26 | Stop reason: HOSPADM

## 2022-07-26 RX ORDER — ACETAMINOPHEN 325 MG/1
650 TABLET ORAL EVERY 4 HOURS PRN
Status: DISCONTINUED | OUTPATIENT
Start: 2022-07-26 | End: 2022-07-26 | Stop reason: HOSPADM

## 2022-07-26 RX ORDER — SODIUM CHLORIDE 0.9 % (FLUSH) 0.9 %
10 SYRINGE (ML) INJECTION AS NEEDED
Status: DISCONTINUED | OUTPATIENT
Start: 2022-07-26 | End: 2022-07-26 | Stop reason: HOSPADM

## 2022-07-26 RX ADMIN — SODIUM CHLORIDE 75 ML/HR: 9 INJECTION, SOLUTION INTRAVENOUS at 09:08

## 2022-07-26 NOTE — DISCHARGE INSTRUCTIONS
[facility]  [address] [place] [address]    Coronary Angiogram (Radial/Ulnar Approach) After Care    Refer to this sheet in the next few weeks. These instructions provide you with information on caring for yourself after your procedure. Your caregiver may also give you more specific instructions. Your treatment has been planned according to current medical practices, but problems sometimes occur. Call your caregiver if you have any problems or questions after your procedure.    Home Care Instructions:  You may shower the day after the procedure. Remove the bandage (dressing) and gently wash the site with plain soap and water. Gently pat the site dry. You may apply a band aid daily for 2 days if desired.    Do not apply powder or lotion to the site.  Do not submerge the affected site in water for 3 to 5 days or until the site is completely healed.   Do not lift, push or pull anything over 5 pounds for 5 days after your procedure or as directed by your physician.  As a reference, a gallon of milk weighs 8 pounds.   Inspect the site at least twice daily. You may notice some bruising at the site and it may be tender for 1 to 2 weeks.     Increase your fluid intake for the next 2 days.    Keep arm elevated for 24 hours. For the remainder of the day, keep your arm in “Pledge of Allegiance” position when up and about.     You may drive 24 hours after the procedure unless otherwise instructed by your caregiver.  Do not operate machinery or power tools for 24 hours.  A responsible adult should be with you for the first 24 hours after you arrive home. Do not make any important legal decisions or sign legal papers for 24 hours.  Do not drink alcohol for 24 hours.    Metformin or any medications containing Metformin should not be taken for 48 hours after your procedure.      Call Your Doctor if:   You have unusual pain at the radial/ulnar (wrist) site.  You have redness, warmth, swelling, or pain at the  Discharge Summary    Patient:  Hubert Farooq  YOB: 1976    MRN: [id number]   Acct: [de-identified]    Primary Care Physician: Ioana Posey    Admit date:  3/8/2022    Discharge date:  3/12/2022     Discharge Diagnoses:   Alcohol dependence with withdrawal ([address]  Principal Problem:    Alcohol dependence with withdrawal ([address]  Active Problems:    Alcohol abuse with withdrawal ([address]  Resolved Problems:    * No resolved hospital problems. *        Admitted for: (HPI)  History obtained from the patient.     The patient is a 54 y.o. male with a history of depression and anxiety that  presents today with complaints of alcohol withdraw. Patient is from home alone. Recently discharged on 2/11/2022 post phenobarbital treatment for alcohol withdrawal.  Patient reports he went approximately two weeks without a drink but became so depressed he started up again. He states he drinks 1-2 liters of rum per day. He has sought treatment today because his withdrawal symptoms continue to worsen. PAWWS score is 4. He states that he wants to get sober but is finding it difficult to not drink when he is discharged from hospital. Patient is tearful at this time. Patient reports headache and nausea but denies vomiting. Fine tremor noted.           Hospital Course:  14-year-old male with history of depression anxiety was admitted for acute alcohol withdrawal symptoms. Patient had recent admission in February for similar symptoms. He was placed on phenobarbital and CIWA protocol. Patient continued to have symptoms of started on Librium scheduled. His symptoms did improve and was not requiring IV intervention. Patient is on a quick Librium taper dose. Resources were given to patient for alcoholics anonymous and addiction services. Patient is on Lexapro for depression. Reluctant for outpatient psychiatry consult due to co-pay. Denies suicidal ideations during hospitalization course.   Lives with family.   Follow up with PCP      Consultants:  Patient Care Team:  Gilson frederick PCP - General (Nurse Practitioner)    Discharge Medications:       Medication List      START taking these medications    chlordiazePOXIDE 10 MG capsule  Commonly known as: LIBRIUM  1 tab PO TID X1 day then 1 tab po bid X1 day then 1 tab po daily X 1 then stop        CHANGE how you take these medications    lisinopril 10 MG tablet  Commonly known as: PRINIVIL;ZESTRIL  Take 1 tablet by mouth daily  What changed: how much to take        CONTINUE taking these medications    acetaminophen 500 MG tablet  Commonly known as: TYLENOL     escitalopram 20 MG tablet  Commonly known as: Lexapro  Take 1.5 tablets by mouth daily     folic acid 1 MG tablet  Commonly known as: FOLVITE  Take 1 tablet by mouth daily     metoprolol tartrate 25 MG tablet  Commonly known as: LOPRESSOR  Take 1 tablet by mouth 2 times daily     thiamine 100 MG tablet  Take 1 tablet by mouth daily        STOP taking these medications    ibuprofen 800 MG tablet  Commonly known as: ADVIL;MOTRIN           Where to Get Your Medications      These medications were sent to [address], [address]  [facility] [phone]  [address] [place] [address]    Phone: [phone]   · folic acid 1 MG tablet  · thiamine 100 MG tablet     You can get these medications from any pharmacy    Bring a paper prescription for each of these medications  · chlordiazePOXIDE 10 MG capsule           Physical Exam:    Vitals:  Vitals:    03/11/22 2301 03/12/22 0326 03/12/22 0915 03/12/22 1119   BP: 114/74 112/78 132/89 106/74   Pulse: 89 80 85 71   Resp:  14 16 16   Temp:  97.5 °F (36.4 °C) 98.6 °F (37 °C) 97.6 °F (36.4 °C)   TempSrc:  Oral Axillary Oral   SpO2:  97% 97% 98%   Weight:         Weight: Weight: 219 lb 9.3 oz (99.6 kg)     24 hour intake/output:    Intake/Output Summary (Last 24 hours) at 3/12/2022 1243  Last data filed at radial/ulnar (wrist) site.  You have drainage (other than a small amount of blood on the dressing).  `You have chills or a fever > 101.  Your arm becomes pale or dark, cool, tingly, or numb.  You develop chest pain, shortness of breath, feel faint or pass out.    You have heavy bleeding from the site, hold pressure on the site for 20 minutes.  If the bleeding stops, apply a fresh bandage and call your cardiologist.  However, if you        continue to have bleeding, call 911 and continue to apply pressure to the site.   You have any symptoms of a stroke.  Remember BE FAST  B-balance. Sudden trouble walking or loss of balance.  E-eyes.  Sudden changes in how you see or a sudden onset of a very bad headache.   F-face. Sudden weakness or loss of feeling of the face or facial droop on one side.   A-arms Sudden weakness or numbness in one arm.  One arm drifts down if they are both held out in front of you. This happens suddenly and usually on one side of the body.   S-speech.  Sudden trouble speaking, slurred speech or trouble understanding what are saying.   T-time  Time to call emergency services.  Write down the symptoms and the time they started.         3/12/2022 0930  Gross per 24 hour   Intake 260 ml   Output 675 ml   Net -415 ml       General appearance - alert awake appears to be in no acute distress  Chest - Bilateral air entry, no wheeze  Heart - S1S2 RRR, no murmurs or gallops  Abdomen - soft, non tender, normoactive bowel sounds  Neurological - non focal  Extremities - no edema  Skin - no rashes or lesions    Procedures:  na    Diagnostic Test:  na    Radiology reports as per the Radiologist  Radiology:  No results found.     Results for orders placed or performed during the hospital encounter of 12/93/28   Basic Metabolic Panel w/ Reflex to MG   Result Value Ref Range    Sodium 139 135 - 145 meq/L    Potassium reflex Magnesium 3.8 3.5 - 5.2 meq/L    Chloride 101 98 - 111 meq/L    CO2 26 23 - 33 meq/L    Glucose 98 70 - 108 mg/dL    BUN 14 7 - 22 mg/dL    CREATININE 1.0 0.4 - 1.2 mg/dL    Calcium 9.1 8.5 - 10.5 mg/dL   CBC with Auto Differential   Result Value Ref Range    WBC 5.8 4.8 - 10.8 thou/mm3    RBC 4.13 (L) 4.70 - 6.10 mill/mm3    Hemoglobin 14.6 14.0 - 18.0 gm/dl    Hematocrit 42.7 42.0 - 52.0 %    .4 (H) 80.0 - 94.0 fL    MCH 35.4 (H) 26.0 - 33.0 pg    MCHC 34.2 32.2 - 35.5 gm/dl    RDW-CV 12.6 11.5 - 14.5 %    RDW-SD 48.1 (H) 35.0 - 45.0 fL    Platelets 453 603 - 007 thou/mm3    MPV 9.8 9.4 - 12.4 fL    Seg Neutrophils 52.4 %    Lymphocytes 32.9 %    Monocytes 10.2 %    Eosinophils 3.8 %    Basophils 0.5 %    Immature Granulocytes 0.2 %    Segs Absolute 3.0 1.8 - 7.7 thou/mm3    Lymphocytes Absolute 1.9 1.0 - 4.8 thou/mm3    Monocytes Absolute 0.6 0.4 - 1.3 thou/mm3    Eosinophils Absolute 0.2 0.0 - 0.4 thou/mm3    Basophils Absolute 0.0 0.0 - 0.1 thou/mm3    Immature Grans (Abs) 0.01 0.00 - 0.07 thou/mm3    nRBC 0 /100 wbc   Hepatic Function Panel   Result Value Ref Range    Albumin 3.9 3.5 - 5.1 g/dL    Total Bilirubin 0.6 0.3 - 1.2 mg/dL    Bilirubin, Direct <0.2 0.0 - 0.3 mg/dL    Alkaline Phosphatase 83 38 - 126 U/L    AST 40 5 - 40 U/L    ALT 42 11 - 66 U/L    Total Protein 6.8 6.1 - 8.0 g/dL   Anion Gap   Result Value Ref Range    Anion Gap 12.0 8.0 - 16.0 meq/L   Glomerular Filtration Rate, Estimated   Result Value Ref Range    EstBlayne Filt Rate 80 (A) ml/min/1.73m2   Comprehensive Metabolic Panel   Result Value Ref Range    Glucose 133 (H) 70 - 108 mg/dL    CREATININE 0.8 0.4 - 1.2 mg/dL    BUN 12 7 - 22 mg/dL    Sodium 134 (L) 135 - 145 meq/L    Potassium 3.8 3.5 - 5.2 meq/L    Chloride 98 98 - 111 meq/L    CO2 26 23 - 33 meq/L    Calcium 8.8 8.5 - 10.5 mg/dL    AST 52 (H) 5 - 40 U/L    Alkaline Phosphatase 81 38 - 126 U/L    Total Protein 6.9 6.1 - 8.0 g/dL    Albumin 3.9 3.5 - 5.1 g/dL    Total Bilirubin 0.4 0.3 - 1.2 mg/dL    ALT 48 11 - 66 U/L   CBC with Auto Differential   Result Value Ref Range    WBC 5.6 4.8 - 10.8 thou/mm3    RBC 4.18 (L) 4.70 - 6.10 mill/mm3    Hemoglobin 14.6 14.0 - 18.0 gm/dl    Hematocrit 42.9 42.0 - 52.0 %    .6 (H) 80.0 - 94.0 fL    MCH 34.9 (H) 26.0 - 33.0 pg    MCHC 34.0 32.2 - 35.5 gm/dl    RDW-CV 12.3 11.5 - 14.5 %    RDW-SD 46.4 (H) 35.0 - 45.0 fL    Platelets 850 803 - 275 thou/mm3    MPV 10.1 9.4 - 12.4 fL    Seg Neutrophils 58.8 %    Lymphocytes 26.7 %    Monocytes 9.7 %    Eosinophils 4.1 %    Basophils 0.5 %    Immature Granulocytes 0.2 %    Segs Absolute 3.3 1.8 - 7.7 thou/mm3    Lymphocytes Absolute 1.5 1.0 - 4.8 thou/mm3    Monocytes Absolute 0.5 0.4 - 1.3 thou/mm3    Eosinophils Absolute 0.2 0.0 - 0.4 thou/mm3    Basophils Absolute 0.0 0.0 - 0.1 thou/mm3    Immature Grans (Abs) 0.01 0.00 - 0.07 thou/mm3    nRBC 0 /100 wbc   Anion Gap   Result Value Ref Range    Anion Gap 10.0 8.0 - 16.0 meq/L   Glomerular Filtration Rate, Estimated   Result Value Ref Range    Est, Glom Filt Rate >90 ml/min/1.73m2   Comprehensive Metabolic Panel   Result Value Ref Range    Glucose 92 70 - 108 mg/dL    CREATININE 1.0 0.4 - 1.2 mg/dL    BUN 11 7 - 22 mg/dL    Sodium 137 135 - 145 meq/L    Potassium 4.1 3.5 - 5.2 meq/L    Chloride 100 98 - 111 meq/L    CO2 25 23 - 33 meq/L    Calcium 8.9 8.5 - 10.5 mg/dL    AST 49 (H) 5 - 40 U/L    Alkaline Phosphatase 86 38 - 126 U/L    Total Protein 7.1 6.1 - 8.0 g/dL    Albumin 4.1 3.5 - 5.1 g/dL    Total Bilirubin 0.3 0.3 - 1.2 mg/dL    ALT 53 11 - 66 U/L   CBC with Auto Differential   Result Value Ref Range    WBC 6.7 4.8 - 10.8 thou/mm3    RBC 4.55 (L) 4.70 - 6.10 mill/mm3    Hemoglobin 15.9 14.0 - 18.0 gm/dl    Hematocrit 47.1 42.0 - 52.0 %    .5 (H) 80.0 - 94.0 fL    MCH 34.9 (H) 26.0 - 33.0 pg    MCHC 33.8 32.2 - 35.5 gm/dl    RDW-CV 12.4 11.5 - 14.5 %    RDW-SD 46.9 (H) 35.0 - 45.0 fL    Platelets 916 533 - 508 thou/mm3    MPV 10.2 9.4 - 12.4 fL    Seg Neutrophils 55.4 %    Lymphocytes 30.6 %    Monocytes 9.3 %    Eosinophils 3.7 %    Basophils 0.6 %    Immature Granulocytes 0.4 %    Segs Absolute 3.7 1.8 - 7.7 thou/mm3    Lymphocytes Absolute 2.1 1.0 - 4.8 thou/mm3    Monocytes Absolute 0.6 0.4 - 1.3 thou/mm3    Eosinophils Absolute 0.2 0.0 - 0.4 thou/mm3    Basophils Absolute 0.0 0.0 - 0.1 thou/mm3    Immature Grans (Abs) 0.03 0.00 - 0.07 thou/mm3    nRBC 0 /100 wbc   Anion Gap   Result Value Ref Range    Anion Gap 12.0 8.0 - 16.0 meq/L   Glomerular Filtration Rate, Estimated   Result Value Ref Range    Est, Glom Filt Rate 80 (A) ml/min/1.73m2   Comprehensive Metabolic Panel   Result Value Ref Range    Glucose 86 70 - 108 mg/dL    CREATININE 1.1 0.4 - 1.2 mg/dL    BUN 13 7 - 22 mg/dL    Sodium 138 135 - 145 meq/L    Potassium 4.4 3.5 - 5.2 meq/L    Chloride 100 98 - 111 meq/L    CO2 27 23 - 33 meq/L    Calcium 9.1 8.5 - 10.5 mg/dL    AST 45 (H) 5 - 40 U/L    Alkaline Phosphatase 85 38 - 126 U/L    Total Protein 7.1 6.1 - 8.0 g/dL    Albumin 4.1 3.5 - 5.1 g/dL    Total Bilirubin 0.3 0.3 - 1.2 mg/dL    ALT 53 11 - 66 U/L   CBC with Auto Differential   Result Value Ref Range    WBC 5.8 4.8 - 10.8 thou/mm3    RBC 4.46 (L) 4.70 - 6.10 mill/mm3    Hemoglobin 15.4 14.0 - 18.0 gm/dl    Hematocrit 45.7 42.0 - 52.0 %    .5 (H) 80.0 - 94.0 fL    MCH 34.5 (H) 26.0 - 33.0 pg    MCHC 33.7 32.2 - 35.5 gm/dl    RDW-CV 12.5 11.5 - 14.5 %    RDW-SD 46.8 (H) 35.0 - 45.0 fL    Platelets 558 526 - 331 thou/mm3    MPV 10.0 9.4 - 12.4 fL    Seg Neutrophils 55.8 %    Lymphocytes 28.1 %    Monocytes 11.8 %    Eosinophils 3.1 %    Basophils 0.5 %    Immature Granulocytes 0.7 %    Segs Absolute 3.2 1.8 - 7.7 thou/mm3    Lymphocytes Absolute 1.6 1.0 - 4.8 thou/mm3    Monocytes Absolute 0.7 0.4 - 1.3 thou/mm3    Eosinophils Absolute 0.2 0.0 - 0.4 thou/mm3    Basophils Absolute 0.0 0.0 - 0.1 thou/mm3    Immature Grans (Abs) 0.04 0.00 - 0.07 thou/mm3    nRBC 0 /100 wbc   Anion Gap   Result Value Ref Range    Anion Gap 11.0 8.0 - 16.0 meq/L   Glomerular Filtration Rate, Estimated   Result Value Ref Range    Est, Glom Filt Rate 72 (A) ml/min/1.73m2   EKG 12 Lead   Result Value Ref Range    Ventricular Rate 83 BPM    Atrial Rate 83 BPM    P-R Interval 130 ms    QRS Duration 82 ms    Q-T Interval 384 ms    QTc Calculation (Bazett) 451 ms    P Axis 31 degrees    R Axis 60 degrees    T Axis 31 degrees   EKG 12 Lead   Result Value Ref Range    Ventricular Rate 81 BPM    Atrial Rate 81 BPM    P-R Interval 126 ms    QRS Duration 86 ms    Q-T Interval 382 ms    QTc Calculation (Bazett) 443 ms    P Axis 4 degrees    R Axis 60 degrees    T Axis 32 degrees       Diet:  ADULT DIET;  Regular    Activity:  Activity as tolerated (Patient may move about with assist as indicated or with supervision.)    Follow-up:  in the next few days with SYDNEE COOLEY    Disposition: home    Condition: Stable      Time Spent: 35 minutes    Electronically signed by Leonardo Falcon MD on 3/12/2022 at 12:43 PM    Discharging Hospitalist

## 2022-07-26 NOTE — CONSULTS
Patient Care Team:  Pallares, Clara Ann, MD as PCP - Internal Medicine (Internal Medicine)  David Stein MD as Consulting Physician (Orthopedic Surgery)  Petr Armenta MD as Consulting Physician (Cardiology)  Jose Luis Morrison MD as Consulting Physician (Urology)    Chief complaint: Aortic stenosis    Subjective     History of Present Illness:   Mr. Mann is a 71 year-old male with PMH of HLD, arthritis, paroxysmal atrial fibrillation (in the presence of [place] Spotted Fever), and known aortic stenosis who was recently seen in the office by Dr. Quintana on 7/21/22 for routine surveillance. 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 He reports occasionally having some shortness of breath when walking up a flight of stairs, but is otherwise asymptomatic. He denies chest pain, orthopnea, and PND. Transthoracic echocardiogram performed on 7/21/22 revealed EF 64.3% and severe aortic stenosis with a bicuspid aortic valve. Today patient underwent cardiac catheterization which revealed no significant CAD. Cardiac Surgery has been consulted for surgical evaluation.        Review of Systems   Constitutional: Negative for fatigue.   Respiratory: Positive for shortness of breath. Negative for chest tightness.         With stairs   Cardiovascular: Negative for chest pain.   Neurological: Negative for syncope.   All other systems reviewed and are negative.       Past Medical History:   Diagnosis Date    Aortic valve sclerosis     Aortic valve stenosis     Arthritis     Elevated cholesterol     Epigastric hernia     Heart murmur     NO TREATMENT    History of atrial fibrillation     History of irregular heartbeat     A-FIB    History of [place] spotted fever     Hyperlipidemia     Swelling     RIGHT HIP AREA     Past Surgical History:   Procedure Laterality Date    CATARACT EXTRACTION WITH INTRAOCULAR LENS IMPLANT Bilateral     COLONOSCOPY N/A  2014    DR. HONEYCUTT    EPIGASTRIC HERNIA REPAIR N/A 3/9/2018    Procedure: EPIGASTRIC HERNIA REPAIR;  Surgeon: David Finn MD;  [place] [facility] [place] [facility] [place]  Service: General    LAPAROSCOPIC INGUINAL HERNIA REPAIR Left 03/27/2015    Dr. Hieu Khan    REPLACEMENT TOTAL KNEE Left 08/06/2012    Dr. David Stein    REPLACEMENT TOTAL KNEE Right 11/12/2003    Right total knee replacement, cementless [employer]-Dr. David Stein    TOTAL HIP ARTHROPLASTY Right 05/11/2009    Dr. David Stein    TOTAL HIP ARTHROPLASTY Left 02/24/2011    Dr. David Stein    TOTAL HIP ARTHROPLASTY Right 12/15/2020    Procedure: RT. TOTAL HIP ARTHROPLASTY;  Surgeon: David Stein II, MD;  Location: [facility] OR;  Service: Orthopedics;  Laterality: Right;     Family History   Problem Relation Age of Onset    Lung cancer Father     Malig Hyperthermia Neg Hx      Social History     Tobacco Use    Smoking status: Never Smoker    Smokeless tobacco: Never Used    Tobacco comment: CAFFEINE USE: 1 CUP COFFEE DAILY   Vaping Use    Vaping Use: Never used   Substance Use Topics    Alcohol use: Yes     Comment: ONE BEER ONCE MONTH    Drug use: No     Medications Prior to Admission   Medication Sig Dispense Refill Last Dose    atorvastatin (LIPITOR) 20 MG tablet Take 20 mg by mouth Every Night.   7/25/2022 at Unknown time    Cholecalciferol (VITAMIN D3 PO) Take 3 tablets by mouth Daily. HOLD FOR SURGERY   7/25/2022 at Unknown time    Multiple Vitamins-Minerals (CENTRUM SILVER ADULT 50+ PO) Take 1 tablet by mouth Daily. HOLD FOR SURGERY   7/25/2022 at Unknown time    sildenafil (VIAGRA) 25 MG tablet Take 25 mg by mouth Daily As Needed for Erectile Dysfunction. HOLD FOR SURGERY   Past Month at Unknown time     sodium chloride, 10 mL, Intravenous, Q12H      Allergies:  Simvastatin    Objective      Vital Signs  Temp:  [97.9 °F (36.6 °C)] 97.9 °F (36.6 °C)  Heart Rate:  [48-59] 54  Resp:  [12-20] 16  BP: (105-143)/(64-78)  "107/65    Flowsheet Rows      Flowsheet Row First Filed Value   Admission Height 170.2 cm (67\") Documented at 07/26/2022 0903   Admission Weight 71.2 kg (157 lb) Documented at 07/26/2022 0903          170.2 cm (67\")    Physical Exam  Vitals and nursing note reviewed.   Constitutional:       General: He is not in acute distress.     Appearance: Normal appearance. He is normal weight. He is not ill-appearing.   HENT:      Head: Normocephalic and atraumatic.      Nose: Nose normal.      Mouth/Throat:      Mouth: Mucous membranes are moist.      Pharynx: Oropharynx is clear.   Eyes:      Extraocular Movements: Extraocular movements intact.      Conjunctiva/sclera: Conjunctivae normal.      Pupils: Pupils are equal, round, and reactive to light.   Cardiovascular:      Rate and Rhythm: Normal rate and regular rhythm.      Pulses: Normal pulses.      Heart sounds: Murmur (III/VI systolic ejection murmur that radiates to the carotids bilaterally) heard.   Pulmonary:      Effort: Pulmonary effort is normal.      Breath sounds: Normal breath sounds.   Abdominal:      General: Abdomen is flat. Bowel sounds are normal.      Palpations: Abdomen is soft.   Musculoskeletal:         General: Normal range of motion.      Cervical back: Normal range of motion and neck supple.      Right lower leg: No edema.      Left lower leg: No edema.   Skin:     General: Skin is warm and dry.   Neurological:      General: No focal deficit present.      Mental Status: He is alert and oriented to person, place, and time.   Psychiatric:         Mood and Affect: Mood normal.         Behavior: Behavior normal.         Thought Content: Thought content normal.         Judgment: Judgment normal.         Results Review:   Lab Results (last 24 hours)       ** No results found for the last 24 hours. **          CARDIAC CATHETERIZATION 7/26/22:    Procedure findings:  Left main: Large-caliber vessel that complex.  Normal  LAD: Medium caliber vessel with " moderate calcification.  Diffuse 20% proximal to mid vessel stenosis.  LCX: Medium caliber vessel that gives rise to a medium caliber first and second obtuse branch.  Normal.  RCA: Medium caliber, dominant vessel gives rise to medium caliber PDA and small caliber RPL.  20% proximal stenosis.     Hemodynamics:   AO: 80/45/58     Estimated blood loss: Minimal     Complications: None     Conclusions:   1. Left main: Normal  2. LAD: Diffuse 20% proximal to mid-vessel stenosis  3. LCX: Normal  4. RCA: Discrete 20% proximal stenosis.      TRANSTHORACIC ECHOCARDIOGRAM 7/21/22:  Calculated left ventricular EF = 64.3% Estimated left ventricular EF was in agreement with the calculated left ventricular EF. Left ventricular systolic function is normal.  Left ventricular diastolic function is consistent with (grade I) impaired relaxation.  Estimated right ventricular systolic pressure from tricuspid regurgitation is mildly elevated (35-45 mmHg). Calculated right ventricular systolic pressure from tricuspid regurgitation is 40 mmHg.  Mild pulmonary hypertension is present.  The aortic valve is abnormal in structure. A bicuspid aortic valve is suggested. There is calcification of the aortic valve. Trace aortic valve regurgitation is present. Severe aortic valve stenosis is present. Aortic valve area is 0.46 cm2. Peak velocity of the flow distal to the aortic valve is 492.5 cm/s. Aortic valve maximum pressure gradient is 97.0 mmHg. Aortic valve mean pressure gradient is 49.3 mmHg. Aortic valve dimensionless index is 0.20 .      Assessment & Plan    Severe aortic stenosis with a bicuspid aortic valve  Normal LV systolic function---EF 64.3%  Non-significant CAD---cardiac cath 7/26  HLD  PAF (in the presence of [place] Spotted Fever)  Arthritis       Patient seen and evaluated by Dr. Randle who recommends mini AVR with a bioprosthetic valve. Patient is agreeable and would like to proceed with surgery. Our office will contact  patient in the next day or two to schedule surgery within the next 2-3 weeks. Patient and his wife were provided with Dr. Randle's business card and instructed to call with any questions or concerns.      GIRISH Capone  07/26/22  12:55 EDT  Addendum  Patient was seen and examined by me, agree with the findings above.  I have reviewed the echocardiogram and the cardiac cath and interpreted results myself.  He has severe aortic stenosis and a bicuspid aortic valve.  He is very active and has no significant comorbidities.  I recommend SAVR over TAVR due to the low surgical risk and STS score less than 2%.  He had an episode of paroxysmal atrial fibrillation in the setting of a viral disease.  He is not on anticoagulation.  I Discussed the risks (STS calculated, benefits and alternatives of surgery and he wishes to proceed.  I have offered him a minimally invasive procedure with a mini sternotomy.  Also we discussed the use of a biologic prosthesis  Corwin Randle MD

## 2022-07-27 ENCOUNTER — TELEPHONE (OUTPATIENT)
Dept: CARDIAC SURGERY | Facility: CLINIC | Age: 72
End: 2022-07-27

## 2022-07-27 ENCOUNTER — PREP FOR SURGERY (OUTPATIENT)
Dept: OTHER | Facility: HOSPITAL | Age: 72
End: 2022-07-27

## 2022-07-27 DIAGNOSIS — R79.1 ABNORMAL COAGULATION PROFILE: ICD-10-CM

## 2022-07-27 DIAGNOSIS — R93.3 ABNORMAL FINDINGS ON DIAGNOSTIC IMAGING OF OTHER PARTS OF DIGESTIVE TRACT: ICD-10-CM

## 2022-07-27 DIAGNOSIS — I35.0 AORTIC VALVE STENOSIS, ETIOLOGY OF CARDIAC VALVE DISEASE UNSPECIFIED: Primary | ICD-10-CM

## 2022-07-27 DIAGNOSIS — I11.0 HYPERTENSIVE HEART DISEASE WITH HEART FAILURE: ICD-10-CM

## 2022-07-27 DIAGNOSIS — R55 SYNCOPE AND COLLAPSE: ICD-10-CM

## 2022-07-27 DIAGNOSIS — R79.9 ABNORMAL FINDING OF BLOOD CHEMISTRY, UNSPECIFIED: ICD-10-CM

## 2022-07-27 RX ORDER — CHLORHEXIDINE GLUCONATE 500 MG/1
1 CLOTH TOPICAL EVERY 12 HOURS PRN
Status: CANCELLED | OUTPATIENT
Start: 2022-07-27

## 2022-07-27 RX ORDER — SODIUM CHLORIDE 9 MG/ML
30 INJECTION, SOLUTION INTRAVENOUS CONTINUOUS PRN
Status: CANCELLED | OUTPATIENT
Start: 2022-07-27

## 2022-07-27 RX ORDER — CEFAZOLIN SODIUM 2 G/100ML
2 INJECTION, SOLUTION INTRAVENOUS
Status: CANCELLED | OUTPATIENT
Start: 2022-07-27 | End: 2022-07-28

## 2022-07-27 RX ORDER — CHLORHEXIDINE GLUCONATE 0.12 MG/ML
15 RINSE ORAL ONCE
Status: CANCELLED | OUTPATIENT
Start: 2022-07-27 | End: 2022-07-27

## 2022-07-27 RX ORDER — NICOTINE POLACRILEX 4 MG
15 LOZENGE BUCCAL
Status: CANCELLED | OUTPATIENT
Start: 2022-07-27

## 2022-07-27 RX ORDER — CHLORHEXIDINE GLUCONATE 0.12 MG/ML
15 RINSE ORAL EVERY 12 HOURS
Status: CANCELLED | OUTPATIENT
Start: 2022-07-27 | End: 2022-07-28

## 2022-07-27 RX ORDER — SODIUM CHLORIDE 0.9 % (FLUSH) 0.9 %
30 SYRINGE (ML) INJECTION ONCE AS NEEDED
Status: CANCELLED | OUTPATIENT
Start: 2022-07-27

## 2022-07-27 RX ORDER — DEXTROSE MONOHYDRATE 25 G/50ML
10-50 INJECTION, SOLUTION INTRAVENOUS
Status: CANCELLED | OUTPATIENT
Start: 2022-07-27

## 2022-07-27 NOTE — TELEPHONE ENCOUNTER
Spoke to patient, PAT is on 8- at 0830 with any testing to follow.  Surgery on 8- at 0730 with an 0500 arrival time.  Patient expressed a verbal understanding of instructions. Instructed to call office with any further questions.

## 2022-08-15 ENCOUNTER — HOSPITAL ENCOUNTER (OUTPATIENT)
Dept: CARDIOLOGY | Facility: HOSPITAL | Age: 72
Discharge: HOME | End: 2022-08-15

## 2022-08-15 ENCOUNTER — PRE-ADMISSION TESTING (OUTPATIENT)
Dept: PREADMISSION TESTING | Facility: HOSPITAL | Age: 72
End: 2022-08-15

## 2022-08-15 ENCOUNTER — HOSPITAL ENCOUNTER (OUTPATIENT)
Dept: GENERAL RADIOLOGY | Facility: HOSPITAL | Age: 72
Discharge: HOME | End: 2022-08-15

## 2022-08-15 ENCOUNTER — ANESTHESIA EVENT (OUTPATIENT)
Dept: PERIOP | Facility: HOSPITAL | Age: 72
End: 2022-08-15

## 2022-08-15 VITALS
BODY MASS INDEX: 25.55 KG/M2 | TEMPERATURE: 98 F | HEIGHT: 67 IN | DIASTOLIC BLOOD PRESSURE: 69 MMHG | OXYGEN SATURATION: 97 % | SYSTOLIC BLOOD PRESSURE: 108 MMHG | HEART RATE: 69 BPM | WEIGHT: 162.8 LBS | RESPIRATION RATE: 18 BRPM

## 2022-08-15 DIAGNOSIS — I35.0 AORTIC VALVE STENOSIS, ETIOLOGY OF CARDIAC VALVE DISEASE UNSPECIFIED: ICD-10-CM

## 2022-08-15 LAB
ABO GROUP BLD: NORMAL
ALBUMIN SERPL-MCNC: 4.1 G/DL (ref 3.5–5.2)
ALBUMIN/GLOB SERPL: 1.4 G/DL
ALP SERPL-CCNC: 98 U/L (ref 39–117)
ALT SERPL W P-5'-P-CCNC: 20 U/L (ref 1–41)
ANION GAP SERPL CALCULATED.3IONS-SCNC: 11.9 MMOL/L (ref 5–15)
APTT PPP: 33 SECONDS (ref 22.7–35.4)
ARTERIAL PATENCY WRIST A: POSITIVE
AST SERPL-CCNC: 25 U/L (ref 1–40)
ATMOSPHERIC PRESS: 750.8 MMHG
BASE EXCESS BLDA CALC-SCNC: -0.4 MMOL/L (ref 0–2)
BASOPHILS # BLD AUTO: 0.03 10*3/MM3 (ref 0–0.2)
BASOPHILS NFR BLD AUTO: 0.5 % (ref 0–1.5)
BDY SITE: ABNORMAL
BH CV XLRA MEAS LEFT DIST CCA EDV: 17.6 CM/SEC
BH CV XLRA MEAS LEFT DIST CCA PSV: 73.3 CM/SEC
BH CV XLRA MEAS LEFT DIST ICA EDV: 19.3 CM/SEC
BH CV XLRA MEAS LEFT DIST ICA PSV: 76.2 CM/SEC
BH CV XLRA MEAS LEFT ICA/CCA RATIO: 1.04
BH CV XLRA MEAS LEFT MID ICA EDV: 19.3 CM/SEC
BH CV XLRA MEAS LEFT MID ICA PSV: 72.1 CM/SEC
BH CV XLRA MEAS LEFT PROX CCA EDV: 17.6 CM/SEC
BH CV XLRA MEAS LEFT PROX CCA PSV: 90.3 CM/SEC
BH CV XLRA MEAS LEFT PROX ECA EDV: 9.8 CM/SEC
BH CV XLRA MEAS LEFT PROX ECA PSV: 62.1 CM/SEC
BH CV XLRA MEAS LEFT PROX ICA EDV: 13.4 CM/SEC
BH CV XLRA MEAS LEFT PROX ICA PSV: 61.7 CM/SEC
BH CV XLRA MEAS LEFT PROX SCLA PSV: 79.7 CM/SEC
BH CV XLRA MEAS LEFT VERTEBRAL A EDV: 7.8 CM/SEC
BH CV XLRA MEAS LEFT VERTEBRAL A PSV: 45.2 CM/SEC
BH CV XLRA MEAS RIGHT DIST CCA EDV: 11.1 CM/SEC
BH CV XLRA MEAS RIGHT DIST CCA PSV: 78.6 CM/SEC
BH CV XLRA MEAS RIGHT DIST ICA EDV: 16.3 CM/SEC
BH CV XLRA MEAS RIGHT DIST ICA PSV: 88.1 CM/SEC
BH CV XLRA MEAS RIGHT ICA/CCA RATIO: 1.12
BH CV XLRA MEAS RIGHT MID ICA EDV: 16.4 CM/SEC
BH CV XLRA MEAS RIGHT MID ICA PSV: 69.8 CM/SEC
BH CV XLRA MEAS RIGHT PROX CCA EDV: 11.8 CM/SEC
BH CV XLRA MEAS RIGHT PROX CCA PSV: 68.8 CM/SEC
BH CV XLRA MEAS RIGHT PROX ECA EDV: 13.4 CM/SEC
BH CV XLRA MEAS RIGHT PROX ECA PSV: 101 CM/SEC
BH CV XLRA MEAS RIGHT PROX ICA EDV: 13.5 CM/SEC
BH CV XLRA MEAS RIGHT PROX ICA PSV: 76.8 CM/SEC
BH CV XLRA MEAS RIGHT PROX SCLA PSV: 141 CM/SEC
BH CV XLRA MEAS RIGHT VERTEBRAL A EDV: 6.68 CM/SEC
BH CV XLRA MEAS RIGHT VERTEBRAL A PSV: 59.7 CM/SEC
BILIRUB SERPL-MCNC: 0.3 MG/DL (ref 0–1.2)
BILIRUB UR QL STRIP: NEGATIVE
BLD GP AB SCN SERPL QL: NEGATIVE
BUN SERPL-MCNC: 16 MG/DL (ref 8–23)
BUN/CREAT SERPL: 23.5 (ref 7–25)
CALCIUM SPEC-SCNC: 9.1 MG/DL (ref 8.6–10.5)
CHLORIDE SERPL-SCNC: 103 MMOL/L (ref 98–107)
CHOLEST SERPL-MCNC: 185 MG/DL (ref 0–200)
CLARITY UR: CLEAR
CLOSE TME COLL+ADP + EPINEP PNL BLD: 98 % (ref 86–100)
CO2 SERPL-SCNC: 25.1 MMOL/L (ref 22–29)
COLOR UR: YELLOW
CREAT SERPL-MCNC: 0.68 MG/DL (ref 0.76–1.27)
DEPRECATED RDW RBC AUTO: 41.8 FL (ref 37–54)
EGFRCR SERPLBLD CKD-EPI 2021: 98.8 ML/MIN/1.73
EOSINOPHIL # BLD AUTO: 0.12 10*3/MM3 (ref 0–0.4)
EOSINOPHIL NFR BLD AUTO: 1.9 % (ref 0.3–6.2)
ERYTHROCYTE [DISTWIDTH] IN BLOOD BY AUTOMATED COUNT: 13.4 % (ref 12.3–15.4)
GLOBULIN UR ELPH-MCNC: 2.9 GM/DL
GLUCOSE SERPL-MCNC: 102 MG/DL (ref 65–99)
GLUCOSE UR STRIP-MCNC: NEGATIVE MG/DL
HBA1C MFR BLD: 6 % (ref 4.8–5.6)
HCO3 BLDA-SCNC: 23.8 MMOL/L (ref 22–28)
HCT VFR BLD AUTO: 42 % (ref 37.5–51)
HDLC SERPL-MCNC: 71 MG/DL (ref 40–60)
HGB BLD-MCNC: 13.8 G/DL (ref 13–17.7)
HGB UR QL STRIP.AUTO: NEGATIVE
IMM GRANULOCYTES # BLD AUTO: 0.02 10*3/MM3 (ref 0–0.05)
IMM GRANULOCYTES NFR BLD AUTO: 0.3 % (ref 0–0.5)
INR PPP: 0.98 (ref 0.9–1.1)
KETONES UR QL STRIP: NEGATIVE
LDLC SERPL CALC-MCNC: 100 MG/DL (ref 0–100)
LDLC/HDLC SERPL: 1.4 {RATIO}
LEFT ARM BP: NORMAL MMHG
LEUKOCYTE ESTERASE UR QL STRIP.AUTO: NEGATIVE
LYMPHOCYTES # BLD AUTO: 1.5 10*3/MM3 (ref 0.7–3.1)
LYMPHOCYTES NFR BLD AUTO: 23.7 % (ref 19.6–45.3)
MAGNESIUM SERPL-MCNC: 2.4 MG/DL (ref 1.6–2.4)
MAXIMAL PREDICTED HEART RATE: 148 BPM
MCH RBC QN AUTO: 28.3 PG (ref 26.6–33)
MCHC RBC AUTO-ENTMCNC: 32.9 G/DL (ref 31.5–35.7)
MCV RBC AUTO: 86.2 FL (ref 79–97)
MODALITY: ABNORMAL
MONOCYTES # BLD AUTO: 0.44 10*3/MM3 (ref 0.1–0.9)
MONOCYTES NFR BLD AUTO: 6.9 % (ref 5–12)
NEUTROPHILS NFR BLD AUTO: 4.23 10*3/MM3 (ref 1.7–7)
NEUTROPHILS NFR BLD AUTO: 66.7 % (ref 42.7–76)
NITRITE UR QL STRIP: NEGATIVE
NRBC BLD AUTO-RTO: 0 /100 WBC (ref 0–0.2)
NT-PROBNP SERPL-MCNC: 478 PG/ML (ref 0–900)
PCO2 BLDA: 37.2 MM HG (ref 35–45)
PH BLDA: 7.42 PH UNITS (ref 7.35–7.45)
PH UR STRIP.AUTO: 6.5 [PH] (ref 5–8)
PLATELET # BLD AUTO: 156 10*3/MM3 (ref 140–450)
PMV BLD AUTO: 11.1 FL (ref 6–12)
PO2 BLDA: 91.3 MM HG (ref 80–100)
POTASSIUM SERPL-SCNC: 4.4 MMOL/L (ref 3.5–5.2)
PROT SERPL-MCNC: 7 G/DL (ref 6–8.5)
PROT UR QL STRIP: NEGATIVE
PROTHROMBIN TIME: 12.9 SECONDS (ref 11.7–14.2)
QT INTERVAL: 412 MS
RBC # BLD AUTO: 4.87 10*6/MM3 (ref 4.14–5.8)
RH BLD: POSITIVE
RIGHT ARM BP: NORMAL MMHG
SAO2 % BLDCOA: 97.2 % (ref 92–99)
SARS-COV-2 ORF1AB RESP QL NAA+PROBE: NOT DETECTED
SODIUM SERPL-SCNC: 140 MMOL/L (ref 136–145)
SP GR UR STRIP: 1.02 (ref 1–1.03)
STRESS TARGET HR: 126 BPM
T&S EXPIRATION DATE: NORMAL
TOTAL RATE: 18 BREATHS/MINUTE
TRIGL SERPL-MCNC: 74 MG/DL (ref 0–150)
UROBILINOGEN UR QL STRIP: NORMAL
VLDLC SERPL-MCNC: 14 MG/DL (ref 5–40)
WBC NRBC COR # BLD: 6.34 10*3/MM3 (ref 3.4–10.8)

## 2022-08-15 PROCEDURE — 86900 BLOOD TYPING SEROLOGIC ABO: CPT

## 2022-08-15 PROCEDURE — 85025 COMPLETE CBC W/AUTO DIFF WBC: CPT

## 2022-08-15 PROCEDURE — 80061 LIPID PANEL: CPT

## 2022-08-15 PROCEDURE — 36600 WITHDRAWAL OF ARTERIAL BLOOD: CPT

## 2022-08-15 PROCEDURE — 83036 HEMOGLOBIN GLYCOSYLATED A1C: CPT

## 2022-08-15 PROCEDURE — 36415 COLL VENOUS BLD VENIPUNCTURE: CPT

## 2022-08-15 PROCEDURE — 86901 BLOOD TYPING SEROLOGIC RH(D): CPT

## 2022-08-15 PROCEDURE — 93010 ELECTROCARDIOGRAM REPORT: CPT | Performed by: INTERNAL MEDICINE

## 2022-08-15 PROCEDURE — 85576 BLOOD PLATELET AGGREGATION: CPT

## 2022-08-15 PROCEDURE — 85730 THROMBOPLASTIN TIME PARTIAL: CPT

## 2022-08-15 PROCEDURE — 71046 X-RAY EXAM CHEST 2 VIEWS: CPT

## 2022-08-15 PROCEDURE — C9803 HOPD COVID-19 SPEC COLLECT: HCPCS

## 2022-08-15 PROCEDURE — 80053 COMPREHEN METABOLIC PANEL: CPT

## 2022-08-15 PROCEDURE — U0005 INFEC AGEN DETEC AMPLI PROBE: HCPCS

## 2022-08-15 PROCEDURE — 86923 COMPATIBILITY TEST ELECTRIC: CPT

## 2022-08-15 PROCEDURE — 83880 ASSAY OF NATRIURETIC PEPTIDE: CPT

## 2022-08-15 PROCEDURE — 85610 PROTHROMBIN TIME: CPT

## 2022-08-15 PROCEDURE — 82803 BLOOD GASES ANY COMBINATION: CPT

## 2022-08-15 PROCEDURE — 83735 ASSAY OF MAGNESIUM: CPT

## 2022-08-15 PROCEDURE — 93005 ELECTROCARDIOGRAM TRACING: CPT

## 2022-08-15 PROCEDURE — U0004 COV-19 TEST NON-CDC HGH THRU: HCPCS

## 2022-08-15 PROCEDURE — 86850 RBC ANTIBODY SCREEN: CPT

## 2022-08-15 PROCEDURE — 81003 URINALYSIS AUTO W/O SCOPE: CPT

## 2022-08-15 PROCEDURE — 93880 EXTRACRANIAL BILAT STUDY: CPT

## 2022-08-15 RX ORDER — MULTIPLE VITAMINS W/ MINERALS TAB 9MG-400MCG
1 TAB ORAL 2 TIMES DAILY
COMMUNITY
End: 2022-09-14

## 2022-08-15 RX ORDER — SODIUM CHLORIDE 0.9 % (FLUSH) 0.9 %
10 SYRINGE (ML) INJECTION EVERY 12 HOURS SCHEDULED
Status: CANCELLED | OUTPATIENT
Start: 2022-08-15

## 2022-08-15 RX ORDER — FAMOTIDINE 10 MG/ML
20 INJECTION, SOLUTION INTRAVENOUS ONCE
Status: CANCELLED | OUTPATIENT
Start: 2022-08-15 | End: 2022-08-15

## 2022-08-15 RX ORDER — SODIUM CHLORIDE, SODIUM LACTATE, POTASSIUM CHLORIDE, CALCIUM CHLORIDE 600; 310; 30; 20 MG/100ML; MG/100ML; MG/100ML; MG/100ML
9 INJECTION, SOLUTION INTRAVENOUS CONTINUOUS
Status: CANCELLED | OUTPATIENT
Start: 2022-08-15

## 2022-08-15 RX ORDER — SODIUM CHLORIDE 0.9 % (FLUSH) 0.9 %
10 SYRINGE (ML) INJECTION AS NEEDED
Status: CANCELLED | OUTPATIENT
Start: 2022-08-15

## 2022-08-15 RX ORDER — CHLORHEXIDINE GLUCONATE 0.12 MG/ML
15 RINSE ORAL EVERY 12 HOURS
Status: DISPENSED | OUTPATIENT
Start: 2022-08-15 | End: 2022-08-16

## 2022-08-15 RX ORDER — CHLORHEXIDINE GLUCONATE 500 MG/1
1 CLOTH TOPICAL EVERY 12 HOURS PRN
Status: DISCONTINUED | OUTPATIENT
Start: 2022-08-15 | End: 2022-09-14

## 2022-08-15 RX ORDER — MIDAZOLAM HYDROCHLORIDE 1 MG/ML
0.5 INJECTION INTRAMUSCULAR; INTRAVENOUS
Status: CANCELLED | OUTPATIENT
Start: 2022-08-15

## 2022-08-15 NOTE — ANESTHESIA PREPROCEDURE EVALUATION
Anesthesia Evaluation     Patient summary reviewed and Nursing notes reviewed   no history of anesthetic complications:  NPO Solid Status: > 6 hours  NPO Liquid Status: > 6 hours           Airway   Mallampati: II  TM distance: >3 FB  Neck ROM: full  no difficulty expected and No difficulty expected  Dental - normal exam     Pulmonary - negative pulmonary ROS and normal exam    breath sounds clear to auscultation  (-) rhonchi, decreased breath sounds, wheezes, rales, stridor  Cardiovascular - normal exam    NYHA Classification: I  ECG reviewed  Rhythm: regular  Rate: normal    (+) valvular problems/murmurs AS, dysrhythmias Paroxysmal Atrial Fib, ROSS, hyperlipidemia,   (-) murmur, weak pulses, friction rub, systolic click, carotid bruits, JVD, peripheral edema    ROS comment: Severe AS w/ minimal ROSS    Neuro/Psych- negative ROS  GI/Hepatic/Renal/Endo - negative ROS     Musculoskeletal     Abdominal  - normal exam    Abdomen: soft.   Substance History - negative use     OB/GYN negative ob/gyn ROS         Other   arthritis,                      Anesthesia Plan    ASA 4     general     intravenous induction     Anesthetic plan, risks, benefits, and alternatives have been provided, discussed and informed consent has been obtained with: patient.        CODE STATUS:

## 2022-08-17 ENCOUNTER — ANESTHESIA (OUTPATIENT)
Dept: PERIOP | Facility: HOSPITAL | Age: 72
End: 2022-08-17

## 2022-08-17 ENCOUNTER — HOSPITAL ENCOUNTER (INPATIENT)
Facility: HOSPITAL | Age: 72
LOS: 6 days | Discharge: HOME HEALTH | End: 2022-08-23
Attending: THORACIC SURGERY (CARDIOTHORACIC VASCULAR SURGERY) | Admitting: THORACIC SURGERY (CARDIOTHORACIC VASCULAR SURGERY)

## 2022-08-17 ENCOUNTER — ANCILLARY PROCEDURE (OUTPATIENT)
Dept: PERIOP | Facility: HOSPITAL | Age: 72
End: 2022-08-17

## 2022-08-17 ENCOUNTER — APPOINTMENT (OUTPATIENT)
Dept: GENERAL RADIOLOGY | Facility: HOSPITAL | Age: 72
End: 2022-08-17

## 2022-08-17 DIAGNOSIS — I35.0 AORTIC VALVE STENOSIS, ETIOLOGY OF CARDIAC VALVE DISEASE UNSPECIFIED: ICD-10-CM

## 2022-08-17 DIAGNOSIS — Z95.2 S/P AVR (AORTIC VALVE REPLACEMENT): Primary | ICD-10-CM

## 2022-08-17 LAB
ACT BLD: 121 SECONDS (ref 82–152)
ACT BLD: 132 SECONDS (ref 82–152)
ACT BLD: 364 SECONDS (ref 82–152)
ACT BLD: 376 SECONDS (ref 82–152)
ACT BLD: 381 SECONDS (ref 82–152)
ALBUMIN SERPL-MCNC: 4.5 G/DL (ref 3.5–5.2)
ALBUMIN SERPL-MCNC: 4.6 G/DL (ref 3.5–5.2)
ANION GAP SERPL CALCULATED.3IONS-SCNC: 13 MMOL/L (ref 5–15)
ANION GAP SERPL CALCULATED.3IONS-SCNC: 13.4 MMOL/L (ref 5–15)
APTT PPP: 35 SECONDS (ref 22.7–35.4)
ARTERIAL PATENCY WRIST A: ABNORMAL
ARTERIAL PATENCY WRIST A: ABNORMAL
ATMOSPHERIC PRESS: 753.5 MMHG
ATMOSPHERIC PRESS: 753.9 MMHG
BASE EXCESS BLDA CALC-SCNC: -1 MMOL/L (ref -5–5)
BASE EXCESS BLDA CALC-SCNC: -1.5 MMOL/L (ref 0–2)
BASE EXCESS BLDA CALC-SCNC: -2.4 MMOL/L (ref 0–2)
BASE EXCESS BLDA CALC-SCNC: 1 MMOL/L (ref -5–5)
BASE EXCESS BLDA CALC-SCNC: 2 MMOL/L (ref -5–5)
BASE EXCESS BLDA CALC-SCNC: 3 MMOL/L (ref -5–5)
BASE EXCESS BLDA CALC-SCNC: 4 MMOL/L (ref -5–5)
BASOPHILS # BLD AUTO: 0.01 10*3/MM3 (ref 0–0.2)
BASOPHILS NFR BLD AUTO: 0.1 % (ref 0–1.5)
BDY SITE: ABNORMAL
BDY SITE: ABNORMAL
BUN SERPL-MCNC: 11 MG/DL (ref 8–23)
BUN SERPL-MCNC: 12 MG/DL (ref 8–23)
BUN/CREAT SERPL: 14.1 (ref 7–25)
BUN/CREAT SERPL: 15.1 (ref 7–25)
CA-I BLD-MCNC: 5.1 MG/DL (ref 4.6–5.4)
CA-I BLDA-SCNC: ABNORMAL MMOL/L
CA-I SERPL ISE-MCNC: 1.28 MMOL/L (ref 1.15–1.35)
CALCIUM SPEC-SCNC: 8.9 MG/DL (ref 8.6–10.5)
CALCIUM SPEC-SCNC: 9.1 MG/DL (ref 8.6–10.5)
CHLORIDE SERPL-SCNC: 103 MMOL/L (ref 98–107)
CHLORIDE SERPL-SCNC: 109 MMOL/L (ref 98–107)
CO2 BLDA-SCNC: 27 MMOL/L (ref 24–29)
CO2 BLDA-SCNC: 29 MMOL/L (ref 24–29)
CO2 BLDA-SCNC: 29 MMOL/L (ref 24–29)
CO2 BLDA-SCNC: 30 MMOL/L (ref 24–29)
CO2 BLDA-SCNC: 31 MMOL/L (ref 24–29)
CO2 SERPL-SCNC: 20 MMOL/L (ref 22–29)
CO2 SERPL-SCNC: 21.6 MMOL/L (ref 22–29)
CREAT SERPL-MCNC: 0.73 MG/DL (ref 0.76–1.27)
CREAT SERPL-MCNC: 0.85 MG/DL (ref 0.76–1.27)
DEPRECATED RDW RBC AUTO: 38.5 FL (ref 37–54)
DEPRECATED RDW RBC AUTO: 41 FL (ref 37–54)
DEPRECATED RDW RBC AUTO: 41 FL (ref 37–54)
EGFRCR SERPLBLD CKD-EPI 2021: 92.3 ML/MIN/1.73
EGFRCR SERPLBLD CKD-EPI 2021: 96.7 ML/MIN/1.73
EOSINOPHIL # BLD AUTO: 0.15 10*3/MM3 (ref 0–0.4)
EOSINOPHIL NFR BLD AUTO: 1.3 % (ref 0.3–6.2)
ERYTHROCYTE [DISTWIDTH] IN BLOOD BY AUTOMATED COUNT: 13.2 % (ref 12.3–15.4)
ERYTHROCYTE [DISTWIDTH] IN BLOOD BY AUTOMATED COUNT: 13.2 % (ref 12.3–15.4)
ERYTHROCYTE [DISTWIDTH] IN BLOOD BY AUTOMATED COUNT: 13.3 % (ref 12.3–15.4)
FIBRINOGEN PPP-MCNC: 286 MG/DL (ref 219–464)
GLUCOSE BLDC GLUCOMTR-MCNC: 115 MG/DL (ref 70–130)
GLUCOSE BLDC GLUCOMTR-MCNC: 120 MG/DL (ref 70–130)
GLUCOSE BLDC GLUCOMTR-MCNC: 120 MG/DL (ref 70–130)
GLUCOSE BLDC GLUCOMTR-MCNC: 127 MG/DL (ref 70–130)
GLUCOSE BLDC GLUCOMTR-MCNC: 131 MG/DL (ref 70–130)
GLUCOSE BLDC GLUCOMTR-MCNC: 133 MG/DL (ref 70–130)
GLUCOSE BLDC GLUCOMTR-MCNC: 133 MG/DL (ref 70–130)
GLUCOSE BLDC GLUCOMTR-MCNC: 134 MG/DL (ref 70–130)
GLUCOSE BLDC GLUCOMTR-MCNC: 145 MG/DL (ref 70–130)
GLUCOSE BLDC GLUCOMTR-MCNC: 145 MG/DL (ref 70–130)
GLUCOSE BLDC GLUCOMTR-MCNC: 154 MG/DL (ref 70–130)
GLUCOSE BLDC GLUCOMTR-MCNC: 156 MG/DL (ref 70–130)
GLUCOSE BLDC GLUCOMTR-MCNC: 158 MG/DL (ref 70–130)
GLUCOSE BLDC GLUCOMTR-MCNC: 163 MG/DL (ref 70–130)
GLUCOSE BLDC GLUCOMTR-MCNC: 168 MG/DL (ref 70–130)
GLUCOSE BLDC GLUCOMTR-MCNC: 172 MG/DL (ref 70–130)
GLUCOSE BLDC GLUCOMTR-MCNC: 176 MG/DL (ref 70–130)
GLUCOSE BLDC GLUCOMTR-MCNC: 189 MG/DL (ref 70–130)
GLUCOSE SERPL-MCNC: 116 MG/DL (ref 65–99)
GLUCOSE SERPL-MCNC: 173 MG/DL (ref 65–99)
HCO3 BLDA-SCNC: 23.1 MMOL/L (ref 22–28)
HCO3 BLDA-SCNC: 23.2 MMOL/L (ref 22–28)
HCO3 BLDA-SCNC: 25.5 MMOL/L (ref 22–26)
HCO3 BLDA-SCNC: 27.2 MMOL/L (ref 22–26)
HCO3 BLDA-SCNC: 27.8 MMOL/L (ref 22–26)
HCO3 BLDA-SCNC: 28 MMOL/L (ref 22–26)
HCO3 BLDA-SCNC: 29.5 MMOL/L (ref 22–26)
HCT VFR BLD AUTO: 34.1 % (ref 37.5–51)
HCT VFR BLD AUTO: 34.1 % (ref 37.5–51)
HCT VFR BLD AUTO: 36.5 % (ref 37.5–51)
HCT VFR BLDA CALC: 32 % (ref 38–51)
HCT VFR BLDA CALC: 32 % (ref 38–51)
HCT VFR BLDA CALC: 34 % (ref 38–51)
HCT VFR BLDA CALC: 34 % (ref 38–51)
HCT VFR BLDA CALC: 36 % (ref 38–51)
HGB BLD-MCNC: 11.4 G/DL (ref 13–17.7)
HGB BLD-MCNC: 11.4 G/DL (ref 13–17.7)
HGB BLD-MCNC: 12.4 G/DL (ref 13–17.7)
HGB BLDA-MCNC: 10.9 G/DL (ref 12–17)
HGB BLDA-MCNC: 10.9 G/DL (ref 12–17)
HGB BLDA-MCNC: 11.6 G/DL (ref 12–17)
HGB BLDA-MCNC: 11.6 G/DL (ref 12–17)
HGB BLDA-MCNC: 12.2 G/DL (ref 12–17)
INHALED O2 CONCENTRATION: 100 %
INHALED O2 CONCENTRATION: 30 %
INR PPP: 1.31 (ref 0.9–1.1)
LYMPHOCYTES # BLD AUTO: 0.57 10*3/MM3 (ref 0.7–3.1)
LYMPHOCYTES NFR BLD AUTO: 4.8 % (ref 19.6–45.3)
MAGNESIUM SERPL-MCNC: 3.2 MG/DL (ref 1.6–2.4)
MAGNESIUM SERPL-MCNC: 3.5 MG/DL (ref 1.6–2.4)
MCH RBC QN AUTO: 27.7 PG (ref 26.6–33)
MCH RBC QN AUTO: 28.6 PG (ref 26.6–33)
MCH RBC QN AUTO: 28.6 PG (ref 26.6–33)
MCHC RBC AUTO-ENTMCNC: 33.4 G/DL (ref 31.5–35.7)
MCHC RBC AUTO-ENTMCNC: 34 G/DL (ref 31.5–35.7)
MCHC RBC AUTO-ENTMCNC: 34.5 G/DL (ref 31.5–35.7)
MCV RBC AUTO: 81.7 FL (ref 79–97)
MCV RBC AUTO: 85.5 FL (ref 79–97)
MCV RBC AUTO: 85.5 FL (ref 79–97)
MODALITY: ABNORMAL
MODALITY: ABNORMAL
MONOCYTES # BLD AUTO: 0.61 10*3/MM3 (ref 0.1–0.9)
MONOCYTES NFR BLD AUTO: 5.1 % (ref 5–12)
NEUTROPHILS NFR BLD AUTO: 10.59 10*3/MM3 (ref 1.7–7)
NEUTROPHILS NFR BLD AUTO: 88.3 % (ref 42.7–76)
O2 A-A PPRESDIFF RESPIRATORY: 0.8 MMHG
O2 A-A PPRESDIFF RESPIRATORY: 0.8 MMHG
PCO2 BLDA: 38.2 MM HG (ref 35–45)
PCO2 BLDA: 38.2 MM HG (ref 35–45)
PCO2 BLDA: 41.5 MM HG (ref 35–45)
PCO2 BLDA: 48.9 MM HG (ref 35–45)
PCO2 BLDA: 49 MM HG (ref 35–45)
PCO2 BLDA: 58.2 MM HG (ref 35–45)
PCO2 BLDA: 65.4 MM HG (ref 35–45)
PEEP RESPIRATORY: 7.5 CM[H2O]
PEEP RESPIRATORY: 7.5 CM[H2O]
PH BLDA: 7.25 PH UNITS (ref 7.35–7.6)
PH BLDA: 7.33 PH UNITS (ref 7.35–7.6)
PH BLDA: 7.35 PH UNITS (ref 7.35–7.45)
PH BLDA: 7.37 PH UNITS (ref 7.35–7.6)
PH BLDA: 7.39 PH UNITS (ref 7.35–7.45)
PH BLDA: 7.4 PH UNITS (ref 7.35–7.6)
PH BLDA: 7.49 PH UNITS (ref 7.35–7.6)
PHOSPHATE SERPL-MCNC: 2.9 MG/DL (ref 2.5–4.5)
PHOSPHATE SERPL-MCNC: 3.4 MG/DL (ref 2.5–4.5)
PLATELET # BLD AUTO: 134 10*3/MM3 (ref 140–450)
PLATELET # BLD AUTO: 134 10*3/MM3 (ref 140–450)
PLATELET # BLD AUTO: 138 10*3/MM3 (ref 140–450)
PMV BLD AUTO: 10.4 FL (ref 6–12)
PMV BLD AUTO: 10.6 FL (ref 6–12)
PMV BLD AUTO: 10.8 FL (ref 6–12)
PO2 BLDA: 141.7 MM HG (ref 80–100)
PO2 BLDA: 403 MMHG (ref 80–105)
PO2 BLDA: 428 MMHG (ref 80–105)
PO2 BLDA: 456 MMHG (ref 80–105)
PO2 BLDA: 49 MMHG (ref 80–105)
PO2 BLDA: 522 MMHG (ref 80–105)
PO2 BLDA: 564.6 MM HG (ref 80–100)
POTASSIUM BLDA-SCNC: 3.8 MMOL/L (ref 3.5–4.9)
POTASSIUM BLDA-SCNC: 4.5 MMOL/L (ref 3.5–4.9)
POTASSIUM BLDA-SCNC: 4.8 MMOL/L (ref 3.5–4.9)
POTASSIUM BLDA-SCNC: 4.8 MMOL/L (ref 3.5–4.9)
POTASSIUM BLDA-SCNC: 5.7 MMOL/L (ref 3.5–4.9)
POTASSIUM SERPL-SCNC: 3.8 MMOL/L (ref 3.5–5.2)
POTASSIUM SERPL-SCNC: 4.5 MMOL/L (ref 3.5–5.2)
PROTHROMBIN TIME: 16.1 SECONDS (ref 11.7–14.2)
PSV: 8 CMH2O
QT INTERVAL: 483 MS
RBC # BLD AUTO: 3.99 10*6/MM3 (ref 4.14–5.8)
RBC # BLD AUTO: 3.99 10*6/MM3 (ref 4.14–5.8)
RBC # BLD AUTO: 4.47 10*6/MM3 (ref 4.14–5.8)
SAO2 % BLDA: 100 % (ref 95–98)
SAO2 % BLDA: 81 % (ref 95–98)
SAO2 % BLDCOA: 100 % (ref 92–99)
SAO2 % BLDCOA: 99.1 % (ref 92–99)
SET MECH RESP RATE: 12
SODIUM SERPL-SCNC: 138 MMOL/L (ref 136–145)
SODIUM SERPL-SCNC: 142 MMOL/L (ref 136–145)
TOTAL RATE: 12 BREATHS/MINUTE
TOTAL RATE: 18 BREATHS/MINUTE
VENTILATOR MODE: ABNORMAL
VENTILATOR MODE: ABNORMAL
VT ON VENT VENT: 650 ML
WBC NRBC COR # BLD: 10.82 10*3/MM3 (ref 3.4–10.8)
WBC NRBC COR # BLD: 12.11 10*3/MM3 (ref 3.4–10.8)
WBC NRBC COR # BLD: 12.11 10*3/MM3 (ref 3.4–10.8)

## 2022-08-17 PROCEDURE — 85014 HEMATOCRIT: CPT

## 2022-08-17 PROCEDURE — C1889 IMPLANT/INSERT DEVICE, NOC: HCPCS | Performed by: THORACIC SURGERY (CARDIOTHORACIC VASCULAR SURGERY)

## 2022-08-17 PROCEDURE — 94761 N-INVAS EAR/PLS OXIMETRY MLT: CPT

## 2022-08-17 PROCEDURE — C1751 CATH, INF, PER/CENT/MIDLINE: HCPCS | Performed by: ANESTHESIOLOGY

## 2022-08-17 PROCEDURE — P9035 PLATELET PHERES LEUKOREDUCED: HCPCS

## 2022-08-17 PROCEDURE — 71045 X-RAY EXAM CHEST 1 VIEW: CPT

## 2022-08-17 PROCEDURE — 33405 REPLACEMENT AORTIC VALVE OPN: CPT | Performed by: PHYSICIAN ASSISTANT

## 2022-08-17 PROCEDURE — 85730 THROMBOPLASTIN TIME PARTIAL: CPT | Performed by: NURSE PRACTITIONER

## 2022-08-17 PROCEDURE — 25010000002 MAGNESIUM SULFATE PER 500 MG OF MAGNESIUM: Performed by: ANESTHESIOLOGY

## 2022-08-17 PROCEDURE — P9100 PATHOGEN TEST FOR PLATELETS: HCPCS

## 2022-08-17 PROCEDURE — 85610 PROTHROMBIN TIME: CPT | Performed by: NURSE PRACTITIONER

## 2022-08-17 PROCEDURE — 94799 UNLISTED PULMONARY SVC/PX: CPT

## 2022-08-17 PROCEDURE — 25010000002 PROPOFOL 10 MG/ML EMULSION: Performed by: ANESTHESIOLOGY

## 2022-08-17 PROCEDURE — 80069 RENAL FUNCTION PANEL: CPT | Performed by: NURSE PRACTITIONER

## 2022-08-17 PROCEDURE — 82803 BLOOD GASES ANY COMBINATION: CPT

## 2022-08-17 PROCEDURE — 85025 COMPLETE CBC W/AUTO DIFF WBC: CPT | Performed by: NURSE PRACTITIONER

## 2022-08-17 PROCEDURE — C1713 ANCHOR/SCREW BN/BN,TIS/BN: HCPCS | Performed by: THORACIC SURGERY (CARDIOTHORACIC VASCULAR SURGERY)

## 2022-08-17 PROCEDURE — 85018 HEMOGLOBIN: CPT

## 2022-08-17 PROCEDURE — 25010000002 FENTANYL CITRATE (PF) 50 MCG/ML SOLUTION: Performed by: ANESTHESIOLOGY

## 2022-08-17 PROCEDURE — 82947 ASSAY GLUCOSE BLOOD QUANT: CPT

## 2022-08-17 PROCEDURE — 88305 TISSUE EXAM BY PATHOLOGIST: CPT | Performed by: THORACIC SURGERY (CARDIOTHORACIC VASCULAR SURGERY)

## 2022-08-17 PROCEDURE — P9047 ALBUMIN (HUMAN), 25%, 50ML: HCPCS

## 2022-08-17 PROCEDURE — 33405 REPLACEMENT AORTIC VALVE OPN: CPT | Performed by: THORACIC SURGERY (CARDIOTHORACIC VASCULAR SURGERY)

## 2022-08-17 PROCEDURE — 25010000002 ALBUMIN HUMAN 5% PER 50 ML: Performed by: NURSE PRACTITIONER

## 2022-08-17 PROCEDURE — 82962 GLUCOSE BLOOD TEST: CPT

## 2022-08-17 PROCEDURE — B24BZZ4 ULTRASONOGRAPHY OF HEART WITH AORTA, TRANSESOPHAGEAL: ICD-10-PCS | Performed by: THORACIC SURGERY (CARDIOTHORACIC VASCULAR SURGERY)

## 2022-08-17 PROCEDURE — 02RF08Z REPLACEMENT OF AORTIC VALVE WITH ZOOPLASTIC TISSUE, OPEN APPROACH: ICD-10-PCS | Performed by: THORACIC SURGERY (CARDIOTHORACIC VASCULAR SURGERY)

## 2022-08-17 PROCEDURE — 25010000002 HEPARIN (PORCINE) PER 1000 UNITS: Performed by: ANESTHESIOLOGY

## 2022-08-17 PROCEDURE — 36430 TRANSFUSION BLD/BLD COMPNT: CPT

## 2022-08-17 PROCEDURE — 0 METHADONE PER 10 MG: Performed by: ANESTHESIOLOGY

## 2022-08-17 PROCEDURE — 25010000002 PROTAMINE SULFATE PER 10 MG: Performed by: ANESTHESIOLOGY

## 2022-08-17 PROCEDURE — 94002 VENT MGMT INPAT INIT DAY: CPT

## 2022-08-17 PROCEDURE — 93318 ECHO TRANSESOPHAGEAL INTRAOP: CPT | Performed by: ANESTHESIOLOGY

## 2022-08-17 PROCEDURE — 25010000002 ALBUMIN HUMAN 25% PER 50 ML

## 2022-08-17 PROCEDURE — 25010000002 MIDAZOLAM PER 1 MG: Performed by: ANESTHESIOLOGY

## 2022-08-17 PROCEDURE — 83735 ASSAY OF MAGNESIUM: CPT | Performed by: NURSE PRACTITIONER

## 2022-08-17 PROCEDURE — 25010000002 CEFAZOLIN IN DEXTROSE 2-4 GM/100ML-% SOLUTION: Performed by: NURSE PRACTITIONER

## 2022-08-17 PROCEDURE — 0 POTASSIUM CHLORIDE PER 2 MEQ: Performed by: NURSE PRACTITIONER

## 2022-08-17 PROCEDURE — P9041 ALBUMIN (HUMAN),5%, 50ML: HCPCS | Performed by: NURSE PRACTITIONER

## 2022-08-17 PROCEDURE — 25010000002 AMIODARONE IN DEXTROSE 5% 360-4.14 MG/200ML-% SOLUTION: Performed by: ANESTHESIOLOGY

## 2022-08-17 PROCEDURE — 88311 DECALCIFY TISSUE: CPT | Performed by: THORACIC SURGERY (CARDIOTHORACIC VASCULAR SURGERY)

## 2022-08-17 PROCEDURE — 82330 ASSAY OF CALCIUM: CPT | Performed by: NURSE PRACTITIONER

## 2022-08-17 PROCEDURE — 5A1221Z PERFORMANCE OF CARDIAC OUTPUT, CONTINUOUS: ICD-10-PCS | Performed by: THORACIC SURGERY (CARDIOTHORACIC VASCULAR SURGERY)

## 2022-08-17 PROCEDURE — 25010000002 METOCLOPRAMIDE PER 10 MG: Performed by: NURSE PRACTITIONER

## 2022-08-17 PROCEDURE — 93005 ELECTROCARDIOGRAM TRACING: CPT | Performed by: NURSE PRACTITIONER

## 2022-08-17 PROCEDURE — 25010000002 HEPARIN (PORCINE) PER 1000 UNITS

## 2022-08-17 PROCEDURE — 85347 COAGULATION TIME ACTIVATED: CPT

## 2022-08-17 PROCEDURE — 25010000002 VANCOMYCIN 1 G RECONSTITUTED SOLUTION

## 2022-08-17 PROCEDURE — 25010000002 AMIODARONE IN DEXTROSE 5% 360-4.14 MG/200ML-% SOLUTION: Performed by: THORACIC SURGERY (CARDIOTHORACIC VASCULAR SURGERY)

## 2022-08-17 PROCEDURE — 88312 SPECIAL STAINS GROUP 1: CPT | Performed by: THORACIC SURGERY (CARDIOTHORACIC VASCULAR SURGERY)

## 2022-08-17 PROCEDURE — 85027 COMPLETE CBC AUTOMATED: CPT | Performed by: NURSE PRACTITIONER

## 2022-08-17 PROCEDURE — 25010000002 MAGNESIUM SULFATE 2 GM/50ML SOLUTION: Performed by: NURSE PRACTITIONER

## 2022-08-17 PROCEDURE — 25010000002 PHENYLEPHRINE 10 MG/ML SOLUTION 5 ML VIAL: Performed by: ANESTHESIOLOGY

## 2022-08-17 PROCEDURE — 85384 FIBRINOGEN ACTIVITY: CPT | Performed by: NURSE PRACTITIONER

## 2022-08-17 DEVICE — SS SUTURE, 4 PER SLEEVE
Type: IMPLANTABLE DEVICE | Site: STERNUM | Status: FUNCTIONAL
Brand: MYO/WIRE II

## 2022-08-17 DEVICE — VLV PERICARD PERIMOUNT MAGNA EASE 25: Type: IMPLANTABLE DEVICE | Site: HEART | Status: FUNCTIONAL

## 2022-08-17 DEVICE — SS SUTURE, 3 PER SLEEVE
Type: IMPLANTABLE DEVICE | Site: STERNUM | Status: FUNCTIONAL
Brand: MYO/WIRE II

## 2022-08-17 RX ORDER — BISACODYL 5 MG/1
10 TABLET, DELAYED RELEASE ORAL DAILY PRN
Status: DISCONTINUED | OUTPATIENT
Start: 2022-08-17 | End: 2022-08-23 | Stop reason: HOSPADM

## 2022-08-17 RX ORDER — SODIUM CHLORIDE 0.9 % (FLUSH) 0.9 %
3-10 SYRINGE (ML) INJECTION AS NEEDED
Status: DISCONTINUED | OUTPATIENT
Start: 2022-08-17 | End: 2022-08-17 | Stop reason: HOSPADM

## 2022-08-17 RX ORDER — MORPHINE SULFATE 2 MG/ML
1 INJECTION, SOLUTION INTRAMUSCULAR; INTRAVENOUS EVERY 4 HOURS PRN
Status: DISCONTINUED | OUTPATIENT
Start: 2022-08-17 | End: 2022-08-23 | Stop reason: HOSPADM

## 2022-08-17 RX ORDER — ACETAMINOPHEN 325 MG/1
650 TABLET ORAL EVERY 4 HOURS PRN
Status: DISCONTINUED | OUTPATIENT
Start: 2022-08-18 | End: 2022-08-23 | Stop reason: HOSPADM

## 2022-08-17 RX ORDER — HEPARIN SODIUM 1000 [USP'U]/ML
INJECTION, SOLUTION INTRAVENOUS; SUBCUTANEOUS AS NEEDED
Status: DISCONTINUED | OUTPATIENT
Start: 2022-08-17 | End: 2022-08-17 | Stop reason: SURG

## 2022-08-17 RX ORDER — MILRINONE LACTATE 0.2 MG/ML
.25-.75 INJECTION, SOLUTION INTRAVENOUS CONTINUOUS PRN
Status: DISCONTINUED | OUTPATIENT
Start: 2022-08-17 | End: 2022-08-18

## 2022-08-17 RX ORDER — MIDAZOLAM HYDROCHLORIDE 1 MG/ML
0.5 INJECTION INTRAMUSCULAR; INTRAVENOUS
Status: DISCONTINUED | OUTPATIENT
Start: 2022-08-17 | End: 2022-08-17 | Stop reason: HOSPADM

## 2022-08-17 RX ORDER — ENOXAPARIN SODIUM 100 MG/ML
40 INJECTION SUBCUTANEOUS DAILY
Status: DISCONTINUED | OUTPATIENT
Start: 2022-08-18 | End: 2022-08-23

## 2022-08-17 RX ORDER — SODIUM CHLORIDE 0.9 % (FLUSH) 0.9 %
10 SYRINGE (ML) INJECTION EVERY 12 HOURS SCHEDULED
Status: DISCONTINUED | OUTPATIENT
Start: 2022-08-17 | End: 2022-08-17 | Stop reason: HOSPADM

## 2022-08-17 RX ORDER — ACETAMINOPHEN 650 MG/1
650 SUPPOSITORY RECTAL EVERY 4 HOURS
Status: DISCONTINUED | OUTPATIENT
Start: 2022-08-17 | End: 2022-08-18

## 2022-08-17 RX ORDER — ACETAMINOPHEN 160 MG/5ML
650 SOLUTION ORAL EVERY 4 HOURS
Status: DISCONTINUED | OUTPATIENT
Start: 2022-08-17 | End: 2022-08-18

## 2022-08-17 RX ORDER — SODIUM CHLORIDE, SODIUM LACTATE, POTASSIUM CHLORIDE, CALCIUM CHLORIDE 600; 310; 30; 20 MG/100ML; MG/100ML; MG/100ML; MG/100ML
9 INJECTION, SOLUTION INTRAVENOUS CONTINUOUS
Status: DISCONTINUED | OUTPATIENT
Start: 2022-08-17 | End: 2022-08-17

## 2022-08-17 RX ORDER — DEXTROSE MONOHYDRATE 25 G/50ML
10-50 INJECTION, SOLUTION INTRAVENOUS
Status: DISCONTINUED | OUTPATIENT
Start: 2022-08-17 | End: 2022-08-18

## 2022-08-17 RX ORDER — POLYETHYLENE GLYCOL 3350 17 G/17G
17 POWDER, FOR SOLUTION ORAL DAILY PRN
Status: DISCONTINUED | OUTPATIENT
Start: 2022-08-17 | End: 2022-08-23

## 2022-08-17 RX ORDER — DOPAMINE HYDROCHLORIDE 160 MG/100ML
2-20 INJECTION, SOLUTION INTRAVENOUS CONTINUOUS PRN
Status: DISCONTINUED | OUTPATIENT
Start: 2022-08-17 | End: 2022-08-18

## 2022-08-17 RX ORDER — NOREPINEPHRINE BIT/0.9 % NACL 8 MG/250ML
.02-.3 INFUSION BOTTLE (ML) INTRAVENOUS CONTINUOUS PRN
Status: DISCONTINUED | OUTPATIENT
Start: 2022-08-17 | End: 2022-08-18

## 2022-08-17 RX ORDER — SODIUM CHLORIDE 9 MG/ML
30 INJECTION, SOLUTION INTRAVENOUS CONTINUOUS
Status: DISCONTINUED | OUTPATIENT
Start: 2022-08-17 | End: 2022-08-23 | Stop reason: HOSPADM

## 2022-08-17 RX ORDER — MEPERIDINE HYDROCHLORIDE 25 MG/ML
25 INJECTION INTRAMUSCULAR; INTRAVENOUS; SUBCUTANEOUS EVERY 4 HOURS PRN
Status: ACTIVE | OUTPATIENT
Start: 2022-08-17 | End: 2022-08-17

## 2022-08-17 RX ORDER — PANTOPRAZOLE SODIUM 40 MG/1
40 TABLET, DELAYED RELEASE ORAL EVERY MORNING
Status: COMPLETED | OUTPATIENT
Start: 2022-08-18 | End: 2022-08-20

## 2022-08-17 RX ORDER — CHLORHEXIDINE GLUCONATE 0.12 MG/ML
15 RINSE ORAL EVERY 12 HOURS
Status: DISCONTINUED | OUTPATIENT
Start: 2022-08-17 | End: 2022-08-22

## 2022-08-17 RX ORDER — ACETAMINOPHEN 10 MG/ML
1000 INJECTION, SOLUTION INTRAVENOUS EVERY 8 HOURS
Status: COMPLETED | OUTPATIENT
Start: 2022-08-17 | End: 2022-08-18

## 2022-08-17 RX ORDER — ALBUMIN, HUMAN INJ 5% 5 %
1000 SOLUTION INTRAVENOUS AS NEEDED
Status: DISPENSED | OUTPATIENT
Start: 2022-08-17 | End: 2022-08-18

## 2022-08-17 RX ORDER — PROPOFOL 10 MG/ML
VIAL (ML) INTRAVENOUS CONTINUOUS PRN
Status: DISCONTINUED | OUTPATIENT
Start: 2022-08-17 | End: 2022-08-17 | Stop reason: SURG

## 2022-08-17 RX ORDER — DEXTROSE MONOHYDRATE 25 G/50ML
10-50 INJECTION, SOLUTION INTRAVENOUS
Status: DISCONTINUED | OUTPATIENT
Start: 2022-08-17 | End: 2022-08-17 | Stop reason: HOSPADM

## 2022-08-17 RX ORDER — ACETAMINOPHEN 160 MG/5ML
650 SOLUTION ORAL EVERY 4 HOURS PRN
Status: DISCONTINUED | OUTPATIENT
Start: 2022-08-18 | End: 2022-08-23 | Stop reason: HOSPADM

## 2022-08-17 RX ORDER — SODIUM CHLORIDE 0.9 % (FLUSH) 0.9 %
10 SYRINGE (ML) INJECTION AS NEEDED
Status: DISCONTINUED | OUTPATIENT
Start: 2022-08-17 | End: 2022-08-17 | Stop reason: HOSPADM

## 2022-08-17 RX ORDER — POTASSIUM CHLORIDE 1.5 G/1.77G
40 POWDER, FOR SOLUTION ORAL AS NEEDED
Status: DISCONTINUED | OUTPATIENT
Start: 2022-08-17 | End: 2022-08-23 | Stop reason: HOSPADM

## 2022-08-17 RX ORDER — CHLORHEXIDINE GLUCONATE 500 MG/1
1 CLOTH TOPICAL EVERY 12 HOURS PRN
Status: DISCONTINUED | OUTPATIENT
Start: 2022-08-17 | End: 2022-08-17 | Stop reason: HOSPADM

## 2022-08-17 RX ORDER — METHADONE HYDROCHLORIDE 10 MG/ML
INJECTION, SOLUTION INTRAMUSCULAR; INTRAVENOUS; SUBCUTANEOUS AS NEEDED
Status: DISCONTINUED | OUTPATIENT
Start: 2022-08-17 | End: 2022-08-17 | Stop reason: SURG

## 2022-08-17 RX ORDER — ASPIRIN 81 MG/1
81 TABLET ORAL DAILY
Status: DISCONTINUED | OUTPATIENT
Start: 2022-08-18 | End: 2022-08-23 | Stop reason: HOSPADM

## 2022-08-17 RX ORDER — ONDANSETRON 2 MG/ML
4 INJECTION INTRAMUSCULAR; INTRAVENOUS EVERY 6 HOURS PRN
Status: DISCONTINUED | OUTPATIENT
Start: 2022-08-17 | End: 2022-08-23 | Stop reason: HOSPADM

## 2022-08-17 RX ORDER — SODIUM CHLORIDE 0.9 % (FLUSH) 0.9 %
3 SYRINGE (ML) INJECTION EVERY 12 HOURS SCHEDULED
Status: DISCONTINUED | OUTPATIENT
Start: 2022-08-17 | End: 2022-08-17 | Stop reason: HOSPADM

## 2022-08-17 RX ORDER — NICOTINE POLACRILEX 4 MG
15 LOZENGE BUCCAL
Status: DISCONTINUED | OUTPATIENT
Start: 2022-08-17 | End: 2022-08-18

## 2022-08-17 RX ORDER — METOCLOPRAMIDE HYDROCHLORIDE 5 MG/ML
10 INJECTION INTRAMUSCULAR; INTRAVENOUS EVERY 6 HOURS
Status: COMPLETED | OUTPATIENT
Start: 2022-08-17 | End: 2022-08-18

## 2022-08-17 RX ORDER — BISACODYL 10 MG
10 SUPPOSITORY, RECTAL RECTAL DAILY PRN
Status: DISCONTINUED | OUTPATIENT
Start: 2022-08-18 | End: 2022-08-23 | Stop reason: HOSPADM

## 2022-08-17 RX ORDER — CEFAZOLIN SODIUM 2 G/100ML
2 INJECTION, SOLUTION INTRAVENOUS EVERY 8 HOURS
Status: COMPLETED | OUTPATIENT
Start: 2022-08-17 | End: 2022-08-19

## 2022-08-17 RX ORDER — MORPHINE SULFATE 2 MG/ML
4 INJECTION, SOLUTION INTRAMUSCULAR; INTRAVENOUS
Status: DISCONTINUED | OUTPATIENT
Start: 2022-08-17 | End: 2022-08-23

## 2022-08-17 RX ORDER — POTASSIUM CHLORIDE 7.45 MG/ML
10 INJECTION INTRAVENOUS
Status: DISCONTINUED | OUTPATIENT
Start: 2022-08-17 | End: 2022-08-23 | Stop reason: HOSPADM

## 2022-08-17 RX ORDER — DEXMEDETOMIDINE HYDROCHLORIDE 4 UG/ML
.2-1.5 INJECTION INTRAVENOUS
Status: DISCONTINUED | OUTPATIENT
Start: 2022-08-17 | End: 2022-08-18

## 2022-08-17 RX ORDER — PROTAMINE SULFATE 10 MG/ML
INJECTION, SOLUTION INTRAVENOUS AS NEEDED
Status: DISCONTINUED | OUTPATIENT
Start: 2022-08-17 | End: 2022-08-17 | Stop reason: SURG

## 2022-08-17 RX ORDER — NITROGLYCERIN 20 MG/100ML
5-200 INJECTION INTRAVENOUS
Status: DISCONTINUED | OUTPATIENT
Start: 2022-08-17 | End: 2022-08-18

## 2022-08-17 RX ORDER — FAMOTIDINE 10 MG/ML
20 INJECTION, SOLUTION INTRAVENOUS ONCE
Status: DISCONTINUED | OUTPATIENT
Start: 2022-08-17 | End: 2022-08-17 | Stop reason: HOSPADM

## 2022-08-17 RX ORDER — NICARDIPINE HYDROCHLORIDE 2.5 MG/ML
INJECTION INTRAVENOUS AS NEEDED
Status: DISCONTINUED | OUTPATIENT
Start: 2022-08-17 | End: 2022-08-17 | Stop reason: SURG

## 2022-08-17 RX ORDER — AMINOCAPROIC ACID 250 MG/ML
INJECTION, SOLUTION INTRAVENOUS AS NEEDED
Status: DISCONTINUED | OUTPATIENT
Start: 2022-08-17 | End: 2022-08-17 | Stop reason: SURG

## 2022-08-17 RX ORDER — SODIUM CHLORIDE 9 MG/ML
INJECTION, SOLUTION INTRAVENOUS CONTINUOUS PRN
Status: DISCONTINUED | OUTPATIENT
Start: 2022-08-17 | End: 2022-08-17 | Stop reason: SURG

## 2022-08-17 RX ORDER — NALOXONE HCL 0.4 MG/ML
0.4 VIAL (ML) INJECTION
Status: DISCONTINUED | OUTPATIENT
Start: 2022-08-17 | End: 2022-08-23 | Stop reason: HOSPADM

## 2022-08-17 RX ORDER — MAGNESIUM HYDROXIDE 1200 MG/15ML
LIQUID ORAL AS NEEDED
Status: DISCONTINUED | OUTPATIENT
Start: 2022-08-17 | End: 2022-08-17 | Stop reason: HOSPADM

## 2022-08-17 RX ORDER — ACETAMINOPHEN 650 MG/1
650 SUPPOSITORY RECTAL EVERY 4 HOURS PRN
Status: DISCONTINUED | OUTPATIENT
Start: 2022-08-18 | End: 2022-08-23 | Stop reason: HOSPADM

## 2022-08-17 RX ORDER — POTASSIUM CHLORIDE 29.8 MG/ML
20 INJECTION INTRAVENOUS
Status: DISCONTINUED | OUTPATIENT
Start: 2022-08-17 | End: 2022-08-23 | Stop reason: HOSPADM

## 2022-08-17 RX ORDER — POTASSIUM CHLORIDE 750 MG/1
40 TABLET, FILM COATED, EXTENDED RELEASE ORAL AS NEEDED
Status: DISCONTINUED | OUTPATIENT
Start: 2022-08-17 | End: 2022-08-23 | Stop reason: HOSPADM

## 2022-08-17 RX ORDER — MAGNESIUM SULFATE HEPTAHYDRATE 40 MG/ML
2 INJECTION, SOLUTION INTRAVENOUS EVERY 8 HOURS
Status: DISPENSED | OUTPATIENT
Start: 2022-08-17 | End: 2022-08-18

## 2022-08-17 RX ORDER — FAMOTIDINE 10 MG/ML
20 INJECTION, SOLUTION INTRAVENOUS ONCE
Status: COMPLETED | OUTPATIENT
Start: 2022-08-17 | End: 2022-08-17

## 2022-08-17 RX ORDER — ACETAMINOPHEN 500 MG
1000 TABLET ORAL ONCE
Status: COMPLETED | OUTPATIENT
Start: 2022-08-17 | End: 2022-08-17

## 2022-08-17 RX ORDER — CEFAZOLIN SODIUM 2 G/100ML
2 INJECTION, SOLUTION INTRAVENOUS
Status: COMPLETED | OUTPATIENT
Start: 2022-08-17 | End: 2022-08-17

## 2022-08-17 RX ORDER — AMOXICILLIN 250 MG
2 CAPSULE ORAL 2 TIMES DAILY
Status: DISCONTINUED | OUTPATIENT
Start: 2022-08-17 | End: 2022-08-23 | Stop reason: HOSPADM

## 2022-08-17 RX ORDER — CHLORHEXIDINE GLUCONATE 0.12 MG/ML
15 RINSE ORAL ONCE
Status: COMPLETED | OUTPATIENT
Start: 2022-08-17 | End: 2022-08-17

## 2022-08-17 RX ORDER — HYDROCODONE BITARTRATE AND ACETAMINOPHEN 5; 325 MG/1; MG/1
2 TABLET ORAL EVERY 4 HOURS PRN
Status: DISCONTINUED | OUTPATIENT
Start: 2022-08-17 | End: 2022-08-23 | Stop reason: HOSPADM

## 2022-08-17 RX ORDER — SODIUM CHLORIDE 9 MG/ML
30 INJECTION, SOLUTION INTRAVENOUS CONTINUOUS PRN
Status: DISCONTINUED | OUTPATIENT
Start: 2022-08-17 | End: 2022-08-17

## 2022-08-17 RX ORDER — FENTANYL CITRATE 50 UG/ML
50 INJECTION, SOLUTION INTRAMUSCULAR; INTRAVENOUS
Status: DISCONTINUED | OUTPATIENT
Start: 2022-08-17 | End: 2022-08-17 | Stop reason: HOSPADM

## 2022-08-17 RX ORDER — ROCURONIUM BROMIDE 10 MG/ML
INJECTION, SOLUTION INTRAVENOUS AS NEEDED
Status: DISCONTINUED | OUTPATIENT
Start: 2022-08-17 | End: 2022-08-17 | Stop reason: SURG

## 2022-08-17 RX ORDER — PANTOPRAZOLE SODIUM 40 MG/10ML
40 INJECTION, POWDER, LYOPHILIZED, FOR SOLUTION INTRAVENOUS ONCE
Status: COMPLETED | OUTPATIENT
Start: 2022-08-17 | End: 2022-08-17

## 2022-08-17 RX ORDER — PHENYLEPHRINE HCL IN 0.9% NACL 0.5 MG/5ML
.2-3 SYRINGE (ML) INTRAVENOUS CONTINUOUS PRN
Status: DISCONTINUED | OUTPATIENT
Start: 2022-08-17 | End: 2022-08-18

## 2022-08-17 RX ORDER — MIDAZOLAM HYDROCHLORIDE 1 MG/ML
2 INJECTION INTRAMUSCULAR; INTRAVENOUS
Status: DISCONTINUED | OUTPATIENT
Start: 2022-08-17 | End: 2022-08-18

## 2022-08-17 RX ORDER — ACETAMINOPHEN 325 MG/1
650 TABLET ORAL EVERY 4 HOURS
Status: DISCONTINUED | OUTPATIENT
Start: 2022-08-17 | End: 2022-08-18

## 2022-08-17 RX ORDER — NOREPINEPHRINE BITARTRATE 1 MG/ML
INJECTION, SOLUTION INTRAVENOUS CONTINUOUS PRN
Status: DISCONTINUED | OUTPATIENT
Start: 2022-08-17 | End: 2022-08-17 | Stop reason: SURG

## 2022-08-17 RX ORDER — MIDAZOLAM HYDROCHLORIDE 1 MG/ML
INJECTION INTRAMUSCULAR; INTRAVENOUS AS NEEDED
Status: DISCONTINUED | OUTPATIENT
Start: 2022-08-17 | End: 2022-08-17 | Stop reason: SURG

## 2022-08-17 RX ORDER — GABAPENTIN 300 MG/1
300 CAPSULE ORAL ONCE
Status: COMPLETED | OUTPATIENT
Start: 2022-08-17 | End: 2022-08-17

## 2022-08-17 RX ORDER — POTASSIUM CHLORIDE 29.8 MG/ML
20 INJECTION INTRAVENOUS
Status: COMPLETED | OUTPATIENT
Start: 2022-08-17 | End: 2022-08-17

## 2022-08-17 RX ORDER — DEXMEDETOMIDINE HYDROCHLORIDE 4 UG/ML
INJECTION, SOLUTION INTRAVENOUS CONTINUOUS PRN
Status: DISCONTINUED | OUTPATIENT
Start: 2022-08-17 | End: 2022-08-17 | Stop reason: SURG

## 2022-08-17 RX ORDER — HYDROCODONE BITARTRATE AND ACETAMINOPHEN 10; 325 MG/1; MG/1
1 TABLET ORAL EVERY 4 HOURS PRN
Status: DISCONTINUED | OUTPATIENT
Start: 2022-08-17 | End: 2022-08-18

## 2022-08-17 RX ORDER — LIDOCAINE HYDROCHLORIDE 10 MG/ML
0.5 INJECTION, SOLUTION EPIDURAL; INFILTRATION; INTRACAUDAL; PERINEURAL ONCE AS NEEDED
Status: DISCONTINUED | OUTPATIENT
Start: 2022-08-17 | End: 2022-08-17 | Stop reason: HOSPADM

## 2022-08-17 RX ORDER — LIDOCAINE HYDROCHLORIDE 20 MG/ML
INJECTION, SOLUTION INFILTRATION; PERINEURAL AS NEEDED
Status: DISCONTINUED | OUTPATIENT
Start: 2022-08-17 | End: 2022-08-17 | Stop reason: SURG

## 2022-08-17 RX ORDER — FENTANYL CITRATE 50 UG/ML
INJECTION, SOLUTION INTRAMUSCULAR; INTRAVENOUS AS NEEDED
Status: DISCONTINUED | OUTPATIENT
Start: 2022-08-17 | End: 2022-08-17 | Stop reason: SURG

## 2022-08-17 RX ORDER — SODIUM CHLORIDE 0.9 % (FLUSH) 0.9 %
30 SYRINGE (ML) INJECTION ONCE AS NEEDED
Status: DISCONTINUED | OUTPATIENT
Start: 2022-08-17 | End: 2022-08-17 | Stop reason: HOSPADM

## 2022-08-17 RX ORDER — MAGNESIUM SULFATE HEPTAHYDRATE 500 MG/ML
INJECTION, SOLUTION INTRAMUSCULAR; INTRAVENOUS AS NEEDED
Status: DISCONTINUED | OUTPATIENT
Start: 2022-08-17 | End: 2022-08-17 | Stop reason: SURG

## 2022-08-17 RX ORDER — PROPOFOL 10 MG/ML
VIAL (ML) INTRAVENOUS AS NEEDED
Status: DISCONTINUED | OUTPATIENT
Start: 2022-08-17 | End: 2022-08-17 | Stop reason: SURG

## 2022-08-17 RX ORDER — ATORVASTATIN CALCIUM 20 MG/1
40 TABLET, FILM COATED ORAL NIGHTLY
Status: DISCONTINUED | OUTPATIENT
Start: 2022-08-17 | End: 2022-08-23 | Stop reason: HOSPADM

## 2022-08-17 RX ORDER — NICOTINE POLACRILEX 4 MG
15 LOZENGE BUCCAL
Status: DISCONTINUED | OUTPATIENT
Start: 2022-08-17 | End: 2022-08-17 | Stop reason: HOSPADM

## 2022-08-17 RX ADMIN — FENTANYL CITRATE 50 MCG: 50 INJECTION INTRAMUSCULAR; INTRAVENOUS at 06:55

## 2022-08-17 RX ADMIN — MIDAZOLAM 1 MG: 1 INJECTION INTRAMUSCULAR; INTRAVENOUS at 06:55

## 2022-08-17 RX ADMIN — SODIUM CHLORIDE: 9 INJECTION, SOLUTION INTRAVENOUS at 06:43

## 2022-08-17 RX ADMIN — NICARDIPINE HYDROCHLORIDE 0.5 MG: 2.5 INJECTION, SOLUTION INTRAVENOUS at 07:57

## 2022-08-17 RX ADMIN — SODIUM CHLORIDE: 9 INJECTION, SOLUTION INTRAVENOUS at 07:39

## 2022-08-17 RX ADMIN — POTASSIUM CHLORIDE 20 MEQ: 29.8 INJECTION, SOLUTION INTRAVENOUS at 16:53

## 2022-08-17 RX ADMIN — MUPIROCIN 1 APPLICATION: 20 OINTMENT TOPICAL at 20:00

## 2022-08-17 RX ADMIN — PROTAMINE SULFATE 250 MG: 10 INJECTION, SOLUTION INTRAVENOUS at 09:28

## 2022-08-17 RX ADMIN — NICARDIPINE HYDROCHLORIDE 0.5 MG: 2.5 INJECTION, SOLUTION INTRAVENOUS at 07:55

## 2022-08-17 RX ADMIN — CHLORHEXIDINE GLUCONATE 15 ML: 1.2 SOLUTION ORAL at 05:47

## 2022-08-17 RX ADMIN — CEFAZOLIN SODIUM 2 G: 2 INJECTION, SOLUTION INTRAVENOUS at 09:54

## 2022-08-17 RX ADMIN — ACETAMINOPHEN 1000 MG: 500 TABLET ORAL at 06:23

## 2022-08-17 RX ADMIN — ACETAMINOPHEN 1000 MG: 10 INJECTION, SOLUTION INTRAVENOUS at 13:36

## 2022-08-17 RX ADMIN — NOREPINEPHRINE BITARTRATE 0.01 MCG/KG/MIN: 1 INJECTION, SOLUTION, CONCENTRATE INTRAVENOUS at 07:41

## 2022-08-17 RX ADMIN — ALBUMIN HUMAN 250 ML: 0.05 INJECTION, SOLUTION INTRAVENOUS at 22:30

## 2022-08-17 RX ADMIN — FAMOTIDINE 20 MG: 10 INJECTION INTRAVENOUS at 05:47

## 2022-08-17 RX ADMIN — ROCURONIUM BROMIDE 20 MG: 50 INJECTION INTRAVENOUS at 08:49

## 2022-08-17 RX ADMIN — CEFAZOLIN SODIUM 2 G: 2 INJECTION, SOLUTION INTRAVENOUS at 07:13

## 2022-08-17 RX ADMIN — MAGNESIUM SULFATE HEPTAHYDRATE 2 G: 2 INJECTION, SOLUTION INTRAVENOUS at 11:06

## 2022-08-17 RX ADMIN — ROCURONIUM BROMIDE 50 MG: 50 INJECTION INTRAVENOUS at 07:04

## 2022-08-17 RX ADMIN — FENTANYL CITRATE 50 MCG: 50 INJECTION INTRAMUSCULAR; INTRAVENOUS at 06:52

## 2022-08-17 RX ADMIN — ATORVASTATIN CALCIUM 40 MG: 20 TABLET, FILM COATED ORAL at 20:00

## 2022-08-17 RX ADMIN — PHENYLEPHRINE HYDROCHLORIDE 0.5 MCG/KG/MIN: 10 INJECTION INTRAVENOUS at 07:05

## 2022-08-17 RX ADMIN — SODIUM CHLORIDE: 9 INJECTION, SOLUTION INTRAVENOUS at 07:40

## 2022-08-17 RX ADMIN — MAGNESIUM SULFATE HEPTAHYDRATE 2 G: 500 INJECTION, SOLUTION INTRAMUSCULAR; INTRAVENOUS at 09:18

## 2022-08-17 RX ADMIN — CHLORHEXIDINE GLUCONATE 1 APPLICATION: 500 CLOTH TOPICAL at 06:24

## 2022-08-17 RX ADMIN — HYDROCODONE BITARTRATE AND ACETAMINOPHEN 2 TABLET: 5; 325 TABLET ORAL at 21:03

## 2022-08-17 RX ADMIN — Medication 50 MCG/KG/MIN: at 07:39

## 2022-08-17 RX ADMIN — HEPARIN SODIUM 23000 UNITS: 1000 INJECTION INTRAVENOUS; SUBCUTANEOUS at 08:04

## 2022-08-17 RX ADMIN — CEFAZOLIN SODIUM 2 G: 2 INJECTION, SOLUTION INTRAVENOUS at 19:19

## 2022-08-17 RX ADMIN — AMIODARONE HYDROCHLORIDE 0.5 MG/MIN: 1.8 INJECTION, SOLUTION INTRAVENOUS at 20:07

## 2022-08-17 RX ADMIN — HYDROCODONE BITARTRATE AND ACETAMINOPHEN 1 TABLET: 10; 325 TABLET ORAL at 16:49

## 2022-08-17 RX ADMIN — MIDAZOLAM 1 MG: 1 INJECTION INTRAMUSCULAR; INTRAVENOUS at 06:52

## 2022-08-17 RX ADMIN — METOCLOPRAMIDE HYDROCHLORIDE 10 MG: 5 INJECTION INTRAMUSCULAR; INTRAVENOUS at 11:24

## 2022-08-17 RX ADMIN — GABAPENTIN 300 MG: 300 CAPSULE ORAL at 06:23

## 2022-08-17 RX ADMIN — NICARDIPINE HYDROCHLORIDE 3 MG/HR: 2.5 INJECTION, SOLUTION INTRAVENOUS at 07:59

## 2022-08-17 RX ADMIN — LIDOCAINE HYDROCHLORIDE 100 MG: 20 INJECTION, SOLUTION INFILTRATION; PERINEURAL at 07:04

## 2022-08-17 RX ADMIN — METOCLOPRAMIDE HYDROCHLORIDE 10 MG: 5 INJECTION INTRAMUSCULAR; INTRAVENOUS at 23:00

## 2022-08-17 RX ADMIN — AMINOCAPROIC ACID 10 G: 250 INJECTION, SOLUTION INTRAVENOUS at 07:51

## 2022-08-17 RX ADMIN — PROPOFOL 140 MG: 10 INJECTION, EMULSION INTRAVENOUS at 07:04

## 2022-08-17 RX ADMIN — AMIODARONE HYDROCHLORIDE 0.5 MG/MIN: 1.8 INJECTION, SOLUTION INTRAVENOUS at 09:41

## 2022-08-17 RX ADMIN — METOPROLOL TARTRATE 12.5 MG: 25 TABLET, FILM COATED ORAL at 05:48

## 2022-08-17 RX ADMIN — NICARDIPINE HYDROCHLORIDE 0.5 MG: 2.5 INJECTION, SOLUTION INTRAVENOUS at 07:54

## 2022-08-17 RX ADMIN — CHLORHEXIDINE GLUCONATE 15 ML: 1.2 SOLUTION ORAL at 20:00

## 2022-08-17 RX ADMIN — METHADONE HYDROCHLORIDE 10 MG: 10 INJECTION, SOLUTION INTRAMUSCULAR; INTRAVENOUS; SUBCUTANEOUS at 07:52

## 2022-08-17 RX ADMIN — AMINOCAPROIC ACID 10 G: 250 INJECTION, SOLUTION INTRAVENOUS at 09:40

## 2022-08-17 RX ADMIN — METOCLOPRAMIDE HYDROCHLORIDE 10 MG: 5 INJECTION INTRAMUSCULAR; INTRAVENOUS at 16:53

## 2022-08-17 RX ADMIN — DEXMEDETOMIDINE HYDROCHLORIDE 1 MCG/KG/HR: 4 INJECTION, SOLUTION INTRAVENOUS at 07:39

## 2022-08-17 RX ADMIN — INSULIN HUMAN 2.1 UNITS/HR: 1 INJECTION, SOLUTION INTRAVENOUS at 12:37

## 2022-08-17 RX ADMIN — NICARDIPINE HYDROCHLORIDE 0.5 MG: 2.5 INJECTION, SOLUTION INTRAVENOUS at 07:56

## 2022-08-17 RX ADMIN — ACETAMINOPHEN 1000 MG: 10 INJECTION, SOLUTION INTRAVENOUS at 22:07

## 2022-08-17 RX ADMIN — PANTOPRAZOLE SODIUM 40 MG: 40 INJECTION, POWDER, FOR SOLUTION INTRAVENOUS at 11:07

## 2022-08-17 NOTE — ANESTHESIA PROCEDURE NOTES
Arterial Line      Patient reassessed immediately prior to procedure    Patient location during procedure: OR  Start time: 8/17/2022 6:57 AM  Stop Time:8/17/2022 7:01 AM       Line placed for hemodynamic monitoring, ABGs/Labs/ISTAT and MD/Surgeon request.  Performed By   Anesthesiologist: Petr Reid MD  Preanesthetic Checklist  Completed: patient identified, IV checked, site marked, risks and benefits discussed, surgical consent, monitors and equipment checked, pre-op evaluation and timeout performed  Arterial Line Prep   Sterile Tech: cap, gloves, mask and sterile barriers  Prep: ChloraPrep  Patient monitoring: blood pressure monitoring, continuous pulse oximetry and EKG  Arterial Line Procedure   Laterality:left  Location:  radial artery  Catheter size: 20 G   Guidance: ultrasound guided  PROCEDURE NOTE/ULTRASOUND INTERPRETATION.  Using ultrasound guidance the potential vascular sites for insertion of the catheter were visualized to determine the patency of the vessel to be used for vascular access.  After selecting the appropriate site for insertion, the needle was visualized under ultrasound being inserted into the radial artery, followed by ultrasound confirmation of wire and catheter placement. There were no abnormalities seen on ultrasound; an image was taken; and the patient tolerated the procedure with no complications.   Number of attempts: 1  Successful placement: yes  Post Assessment   Dressing Type: wrist guard applied, occlusive dressing applied and secured with tape.   Complications no  Circ/Move/Sens Assessment: normal and unchanged.   Patient Tolerance: patient tolerated the procedure well with no apparent complications

## 2022-08-17 NOTE — ANESTHESIA PROCEDURE NOTES
Diagnostic Intraop ESAU    Procedure Performed: Diagnostic Intraop ESAU       Start Time:  8/17/2022 7:46 AM       End Time:   8/17/2022 7:56 AM      General Procedure Information  Physician Requesting Echo: Corwin Randle MD  CPT Code:  73186, [id number]  ICD Code for Medical Necessity:  Tricuspid regurgitation nonrhuematic nI 36.1, atherosclerosis of aorta I70.0  Location performed:  OR  Intubated  Bite block placed  Heart visualized  Probe Insertion:  Easy  Probe Type:  Multiplane  Modalities:  Color flow mapping, pulse wave Doppler and continuous wave Doppler    Echocardiographic and Doppler Measurements    Ventricles    Left Ventricle:  Global Function mildly impaired.  Ejection Fraction 52%.          Valves    Aortic Valve:  Mean Gradient: 34 mmHg.      Mitral Valve:  Area: 5 cm².  Mitral Pressure Half-time: 44 ms.            Aorta    Ascending Aorta:  Diameter 3.07 cm.    Aortic Arch:  Diameter 2.3 cm.    Descending Aorta:  Diameter 2.3 cm.                  Diastolic Function Measurements:  Diastolic Dysfunction Grade=  E= 51.2 ms  A= 26.5 ms  E/A Ratio=  1.9  DT=  150 ms  S/D=  1.5  IVRT=        Anesthesia Information  Performed Personally  Anesthesiologist:  Petr Reid MD      Echocardiogram Comments:       Postbypass results:  S/p bioprosthetic AVR donny AI or AS new mean gradient 10 mmHg, no perivalvular leak all leaflets opening and closing well,  MV mild MR no Ms  TV mid TR no TS  LVEF retained at 60%

## 2022-08-17 NOTE — ANESTHESIA POSTPROCEDURE EVALUATION
"Patient: Hieu Mann    Procedure Summary     Date: 08/17/22 Room / Location: [address] CARDIOVASCULAR OPERATING ROOM    Anesthesia Start: 0643 Anesthesia Stop: 1039    Procedure: MINI STERNOTOMY; AORTIC VALVE REPLACEMENT; TRANSESOPHAGEAL ECHOCARDIOGRAM; PRP (N/A Chest) Diagnosis:       Aortic valve stenosis, etiology of cardiac valve disease unspecified      (Aortic valve stenosis, etiology of cardiac valve disease unspecified [I35.0])    Surgeons: Corwin Randle MD Provider: Petr Reid MD    Anesthesia Type: general ASA Status: 4          Anesthesia Type: general    Vitals  Vitals Value Taken Time   /67 08/17/22 1601   Temp 36 °C (96.8 °F) 08/17/22 1615   Pulse 80 08/17/22 1615   Resp 12 08/17/22 1200   SpO2 98 % 08/17/22 1615   Vitals shown include unvalidated device data.        Post Anesthesia Care and Evaluation    Patient location during evaluation: bedside  Patient participation: complete - patient participated  Level of consciousness: awake and alert  Pain management: adequate    Airway patency: patent  Anesthetic complications: No anesthetic complications    Cardiovascular status: acceptable  Respiratory status: acceptable  Hydration status: acceptable    Comments: /66   Pulse 80   Temp 36.1 °C (96.98 °F)   Resp 12   Ht 170.2 cm (67.01\")   Wt 73.8 kg (162 lb 11.2 oz)   SpO2 97%   BMI 25.48 kg/m²       "

## 2022-08-17 NOTE — ANESTHESIA PROCEDURE NOTES
Central Line      Patient reassessed immediately prior to procedure    Patient location during procedure: OR  Start time: 8/17/2022 7:16 AM  Stop Time:8/17/2022 7:27 AM  Indications: vascular access  Staff  Anesthesiologist: Petr Reid MD  Preanesthetic Checklist  Completed: patient identified, IV checked, site marked, risks and benefits discussed, surgical consent, monitors and equipment checked, pre-op evaluation and timeout performed  Central Line Prep  Sterile Tech:cap, gloves, gown, mask and sterile barriers  Prep: chloraprep  Patient monitoring: continuous pulse oximetry, blood pressure monitoring and EKG  Central Line Procedure  Laterality:right  Location:internal jugular  Catheter Type:Cordis and double lumen  Catheter Size:9 Fr  Guidance:ultrasound guided  PROCEDURE NOTE/ULTRASOUND INTERPRETATION.  Using ultrasound guidance the potential vascular sites for insertion of the catheter were visualized to determine the patency of the vessel to be used for vascular access.  After selecting the appropriate site for insertion, the needle was visualized under ultrasound being inserted into the internal jugular vein, followed by ultrasound confirmation of wire and catheter placement. There were no abnormalities seen on ultrasound; an image was taken; and the patient tolerated the procedure with no complications. Images: still images obtained, printed/placed on chart  Assessment  Post procedure:biopatch applied, line sutured and occlusive dressing applied  Assessement:blood return through all ports, free fluid flow, placement verified by x-ray, Floyd Test, no pneumothorax on x-ray and chest x-ray ordered  Complications:no  Patient Tolerance:patient tolerated the procedure well with no apparent complications  Additional Notes  Ultrasound guidance was used for needle placement.

## 2022-08-17 NOTE — ANESTHESIA PROCEDURE NOTES
Central Line      Patient reassessed immediately prior to procedure    Patient location during procedure: OR  Start time: 8/17/2022 7:27 AM  Stop Time:8/17/2022 7:30 AM  Indications: central pressure monitoring  Staff  Anesthesiologist: Petr Reid MD  Preanesthetic Checklist  Completed: patient identified, IV checked, site marked, risks and benefits discussed, surgical consent, monitors and equipment checked, pre-op evaluation and timeout performed  Central Line Prep  Sterile Tech:cap, gloves, gown, mask and sterile barriers  Prep: chloraprep  Patient monitoring: blood pressure monitoring, continuous pulse oximetry and EKG  Central Line Procedure  Laterality:right  Location:internal jugular  Catheter Type:Tahoma-Yong  Assessment  Post procedure:line sutured, biopatch applied and occlusive dressing applied  Assessement:chest x-ray ordered, no pneumothorax on x-ray and free fluid flow  Complications:no  Patient Tolerance:patient tolerated the procedure well with no apparent complications

## 2022-08-17 NOTE — ANESTHESIA PROCEDURE NOTES
Airway  Urgency: elective    Date/Time: 8/17/2022 7:07 AM  Airway not difficult    General Information and Staff    Patient location during procedure: OR  Anesthesiologist: Petr Reid MD    Indications and Patient Condition  Indications for airway management: airway protection    Preoxygenated: yes  Mask difficulty assessment: 1 - vent by mask    Final Airway Details  Final airway type: endotracheal airway      Successful airway: ETT  Cuffed: yes   Successful intubation technique: direct laryngoscopy  Facilitating devices/methods: cricoid pressure and intubating stylet  Endotracheal tube insertion site: oral  Blade: Steven  Blade size: 4  ETT size (mm): 7.5  Cormack-Lehane Classification: grade IIa - partial view of glottis  Placement verified by: chest auscultation   Measured from: lips  ETT/EBT  to lips (cm): 22  Number of attempts at approach: 1  Assessment: lips, teeth, and gum same as pre-op and atraumatic intubation

## 2022-08-18 ENCOUNTER — APPOINTMENT (OUTPATIENT)
Dept: GENERAL RADIOLOGY | Facility: HOSPITAL | Age: 72
End: 2022-08-18

## 2022-08-18 LAB
ALBUMIN SERPL-MCNC: 4.4 G/DL (ref 3.5–5.2)
ANION GAP SERPL CALCULATED.3IONS-SCNC: 11 MMOL/L (ref 5–15)
BASOPHILS # BLD AUTO: 0.02 10*3/MM3 (ref 0–0.2)
BASOPHILS NFR BLD AUTO: 0.2 % (ref 0–1.5)
BH BB BLOOD EXPIRATION DATE: NORMAL
BH BB BLOOD TYPE BARCODE: 6200
BH BB DISPENSE STATUS: NORMAL
BH BB PRODUCT CODE: NORMAL
BH BB UNIT NUMBER: NORMAL
BUN SERPL-MCNC: 10 MG/DL (ref 8–23)
BUN/CREAT SERPL: 15.9 (ref 7–25)
CA-I BLD-MCNC: 4.9 MG/DL (ref 4.6–5.4)
CA-I SERPL ISE-MCNC: 1.23 MMOL/L (ref 1.15–1.35)
CALCIUM SPEC-SCNC: 8.3 MG/DL (ref 8.6–10.5)
CHLORIDE SERPL-SCNC: 109 MMOL/L (ref 98–107)
CO2 SERPL-SCNC: 22 MMOL/L (ref 22–29)
CREAT SERPL-MCNC: 0.63 MG/DL (ref 0.76–1.27)
DEPRECATED RDW RBC AUTO: 40.2 FL (ref 37–54)
EGFRCR SERPLBLD CKD-EPI 2021: 101.1 ML/MIN/1.73
EOSINOPHIL # BLD AUTO: 0 10*3/MM3 (ref 0–0.4)
EOSINOPHIL NFR BLD AUTO: 0 % (ref 0.3–6.2)
ERYTHROCYTE [DISTWIDTH] IN BLOOD BY AUTOMATED COUNT: 13.2 % (ref 12.3–15.4)
GLUCOSE BLDC GLUCOMTR-MCNC: 120 MG/DL (ref 70–130)
GLUCOSE BLDC GLUCOMTR-MCNC: 130 MG/DL (ref 70–130)
GLUCOSE BLDC GLUCOMTR-MCNC: 131 MG/DL (ref 70–130)
GLUCOSE BLDC GLUCOMTR-MCNC: 131 MG/DL (ref 70–130)
GLUCOSE BLDC GLUCOMTR-MCNC: 133 MG/DL (ref 70–130)
GLUCOSE BLDC GLUCOMTR-MCNC: 136 MG/DL (ref 70–130)
GLUCOSE BLDC GLUCOMTR-MCNC: 155 MG/DL (ref 70–130)
GLUCOSE SERPL-MCNC: 134 MG/DL (ref 65–99)
HCT VFR BLD AUTO: 33.1 % (ref 37.5–51)
HGB BLD-MCNC: 11.3 G/DL (ref 13–17.7)
INR PPP: 1.12 (ref 0.9–1.1)
LYMPHOCYTES # BLD AUTO: 0.45 10*3/MM3 (ref 0.7–3.1)
LYMPHOCYTES NFR BLD AUTO: 4.3 % (ref 19.6–45.3)
MAGNESIUM SERPL-MCNC: 2.4 MG/DL (ref 1.6–2.4)
MCH RBC QN AUTO: 28.7 PG (ref 26.6–33)
MCHC RBC AUTO-ENTMCNC: 34.1 G/DL (ref 31.5–35.7)
MCV RBC AUTO: 84 FL (ref 79–97)
MONOCYTES # BLD AUTO: 0.64 10*3/MM3 (ref 0.1–0.9)
MONOCYTES NFR BLD AUTO: 6.1 % (ref 5–12)
NEUTROPHILS NFR BLD AUTO: 89 % (ref 42.7–76)
NEUTROPHILS NFR BLD AUTO: 9.29 10*3/MM3 (ref 1.7–7)
PHOSPHATE SERPL-MCNC: 3.8 MG/DL (ref 2.5–4.5)
PLATELET # BLD AUTO: 125 10*3/MM3 (ref 140–450)
PMV BLD AUTO: 11.2 FL (ref 6–12)
POTASSIUM SERPL-SCNC: 3.8 MMOL/L (ref 3.5–5.2)
PROTHROMBIN TIME: 14.3 SECONDS (ref 11.7–14.2)
QT INTERVAL: 428 MS
RBC # BLD AUTO: 3.94 10*6/MM3 (ref 4.14–5.8)
SODIUM SERPL-SCNC: 142 MMOL/L (ref 136–145)
UNIT  ABO: NORMAL
UNIT  RH: NORMAL
WBC NRBC COR # BLD: 10.44 10*3/MM3 (ref 3.4–10.8)

## 2022-08-18 PROCEDURE — 82962 GLUCOSE BLOOD TEST: CPT

## 2022-08-18 PROCEDURE — 25010000002 MAGNESIUM SULFATE 2 GM/50ML SOLUTION: Performed by: NURSE PRACTITIONER

## 2022-08-18 PROCEDURE — 82330 ASSAY OF CALCIUM: CPT | Performed by: NURSE PRACTITIONER

## 2022-08-18 PROCEDURE — 93005 ELECTROCARDIOGRAM TRACING: CPT | Performed by: THORACIC SURGERY (CARDIOTHORACIC VASCULAR SURGERY)

## 2022-08-18 PROCEDURE — 97162 PT EVAL MOD COMPLEX 30 MIN: CPT

## 2022-08-18 PROCEDURE — 25010000002 MORPHINE PER 10 MG: Performed by: NURSE PRACTITIONER

## 2022-08-18 PROCEDURE — 93010 ELECTROCARDIOGRAM REPORT: CPT | Performed by: INTERNAL MEDICINE

## 2022-08-18 PROCEDURE — 99221 1ST HOSP IP/OBS SF/LOW 40: CPT | Performed by: INTERNAL MEDICINE

## 2022-08-18 PROCEDURE — 97530 THERAPEUTIC ACTIVITIES: CPT

## 2022-08-18 PROCEDURE — 25010000002 AMIODARONE IN DEXTROSE 5% 360-4.14 MG/200ML-% SOLUTION: Performed by: THORACIC SURGERY (CARDIOTHORACIC VASCULAR SURGERY)

## 2022-08-18 PROCEDURE — 85025 COMPLETE CBC W/AUTO DIFF WBC: CPT | Performed by: NURSE PRACTITIONER

## 2022-08-18 PROCEDURE — 25010000002 METOCLOPRAMIDE PER 10 MG: Performed by: NURSE PRACTITIONER

## 2022-08-18 PROCEDURE — 85610 PROTHROMBIN TIME: CPT | Performed by: NURSE PRACTITIONER

## 2022-08-18 PROCEDURE — 71045 X-RAY EXAM CHEST 1 VIEW: CPT

## 2022-08-18 PROCEDURE — 80069 RENAL FUNCTION PANEL: CPT | Performed by: NURSE PRACTITIONER

## 2022-08-18 PROCEDURE — 25010000002 ENOXAPARIN PER 10 MG: Performed by: NURSE PRACTITIONER

## 2022-08-18 PROCEDURE — 94799 UNLISTED PULMONARY SVC/PX: CPT

## 2022-08-18 PROCEDURE — 25010000002 CEFAZOLIN IN DEXTROSE 2-4 GM/100ML-% SOLUTION: Performed by: NURSE PRACTITIONER

## 2022-08-18 PROCEDURE — 83735 ASSAY OF MAGNESIUM: CPT | Performed by: NURSE PRACTITIONER

## 2022-08-18 PROCEDURE — 99024 POSTOP FOLLOW-UP VISIT: CPT | Performed by: THORACIC SURGERY (CARDIOTHORACIC VASCULAR SURGERY)

## 2022-08-18 RX ORDER — GUAIFENESIN 600 MG/1
1200 TABLET, EXTENDED RELEASE ORAL EVERY 12 HOURS SCHEDULED
Status: DISCONTINUED | OUTPATIENT
Start: 2022-08-18 | End: 2022-08-23 | Stop reason: HOSPADM

## 2022-08-18 RX ORDER — INSULIN LISPRO 100 [IU]/ML
0-9 INJECTION, SOLUTION INTRAVENOUS; SUBCUTANEOUS
Status: DISCONTINUED | OUTPATIENT
Start: 2022-08-18 | End: 2022-08-20

## 2022-08-18 RX ORDER — AMIODARONE HYDROCHLORIDE 200 MG/1
200 TABLET ORAL EVERY 12 HOURS SCHEDULED
Status: DISCONTINUED | OUTPATIENT
Start: 2022-08-18 | End: 2022-08-21

## 2022-08-18 RX ORDER — CYCLOBENZAPRINE HCL 10 MG
5 TABLET ORAL EVERY 8 HOURS PRN
Status: DISCONTINUED | OUTPATIENT
Start: 2022-08-18 | End: 2022-08-23 | Stop reason: HOSPADM

## 2022-08-18 RX ORDER — DEXTROSE MONOHYDRATE 25 G/50ML
25 INJECTION, SOLUTION INTRAVENOUS
Status: DISCONTINUED | OUTPATIENT
Start: 2022-08-18 | End: 2022-08-20

## 2022-08-18 RX ORDER — AMIODARONE HYDROCHLORIDE 200 MG/1
300 TABLET ORAL EVERY 12 HOURS SCHEDULED
Status: DISCONTINUED | OUTPATIENT
Start: 2022-08-18 | End: 2022-08-18

## 2022-08-18 RX ORDER — NICOTINE POLACRILEX 4 MG
15 LOZENGE BUCCAL
Status: DISCONTINUED | OUTPATIENT
Start: 2022-08-18 | End: 2022-08-20

## 2022-08-18 RX ORDER — OXYCODONE HYDROCHLORIDE 5 MG/1
10 TABLET ORAL EVERY 4 HOURS PRN
Status: DISCONTINUED | OUTPATIENT
Start: 2022-08-18 | End: 2022-08-23 | Stop reason: HOSPADM

## 2022-08-18 RX ADMIN — CEFAZOLIN SODIUM 2 G: 2 INJECTION, SOLUTION INTRAVENOUS at 09:35

## 2022-08-18 RX ADMIN — CHLORHEXIDINE GLUCONATE 15 ML: 1.2 SOLUTION ORAL at 08:31

## 2022-08-18 RX ADMIN — ASPIRIN 81 MG: 81 TABLET, COATED ORAL at 08:31

## 2022-08-18 RX ADMIN — MAGNESIUM SULFATE HEPTAHYDRATE 2 G: 2 INJECTION, SOLUTION INTRAVENOUS at 04:22

## 2022-08-18 RX ADMIN — MUPIROCIN 1 APPLICATION: 20 OINTMENT TOPICAL at 08:31

## 2022-08-18 RX ADMIN — ENOXAPARIN SODIUM 40 MG: 100 INJECTION SUBCUTANEOUS at 21:21

## 2022-08-18 RX ADMIN — METOCLOPRAMIDE HYDROCHLORIDE 10 MG: 5 INJECTION INTRAMUSCULAR; INTRAVENOUS at 04:45

## 2022-08-18 RX ADMIN — CEFAZOLIN SODIUM 2 G: 2 INJECTION, SOLUTION INTRAVENOUS at 21:22

## 2022-08-18 RX ADMIN — CHLORHEXIDINE GLUCONATE 15 ML: 1.2 SOLUTION ORAL at 21:21

## 2022-08-18 RX ADMIN — OXYCODONE 5 MG: 5 TABLET ORAL at 08:31

## 2022-08-18 RX ADMIN — METOPROLOL TARTRATE 12.5 MG: 25 TABLET, FILM COATED ORAL at 21:21

## 2022-08-18 RX ADMIN — AMIODARONE HYDROCHLORIDE 200 MG: 200 TABLET ORAL at 21:21

## 2022-08-18 RX ADMIN — DOCUSATE SODIUM 50MG AND SENNOSIDES 8.6MG 2 TABLET: 8.6; 5 TABLET, FILM COATED ORAL at 21:21

## 2022-08-18 RX ADMIN — ACETAMINOPHEN 1000 MG: 10 INJECTION, SOLUTION INTRAVENOUS at 06:30

## 2022-08-18 RX ADMIN — CEFAZOLIN SODIUM 2 G: 2 INJECTION, SOLUTION INTRAVENOUS at 01:12

## 2022-08-18 RX ADMIN — ATORVASTATIN CALCIUM 40 MG: 20 TABLET, FILM COATED ORAL at 21:21

## 2022-08-18 RX ADMIN — OXYCODONE 5 MG: 5 TABLET ORAL at 13:44

## 2022-08-18 RX ADMIN — PANTOPRAZOLE SODIUM 40 MG: 40 TABLET, DELAYED RELEASE ORAL at 06:30

## 2022-08-18 RX ADMIN — METOPROLOL TARTRATE 12.5 MG: 25 TABLET, FILM COATED ORAL at 08:35

## 2022-08-18 RX ADMIN — AMIODARONE HYDROCHLORIDE 0.5 MG/MIN: 1.8 INJECTION, SOLUTION INTRAVENOUS at 06:38

## 2022-08-18 RX ADMIN — GUAIFENESIN 1200 MG: 600 TABLET, EXTENDED RELEASE ORAL at 09:35

## 2022-08-18 RX ADMIN — GUAIFENESIN 1200 MG: 600 TABLET, EXTENDED RELEASE ORAL at 21:21

## 2022-08-18 RX ADMIN — MUPIROCIN: 20 OINTMENT TOPICAL at 21:22

## 2022-08-19 ENCOUNTER — APPOINTMENT (OUTPATIENT)
Dept: GENERAL RADIOLOGY | Facility: HOSPITAL | Age: 72
End: 2022-08-19

## 2022-08-19 LAB
ANION GAP SERPL CALCULATED.3IONS-SCNC: 8 MMOL/L (ref 5–15)
BUN SERPL-MCNC: 10 MG/DL (ref 8–23)
BUN/CREAT SERPL: 19.6 (ref 7–25)
CALCIUM SPEC-SCNC: 8.6 MG/DL (ref 8.6–10.5)
CHLORIDE SERPL-SCNC: 103 MMOL/L (ref 98–107)
CO2 SERPL-SCNC: 26 MMOL/L (ref 22–29)
CREAT SERPL-MCNC: 0.51 MG/DL (ref 0.76–1.27)
DEPRECATED RDW RBC AUTO: 41.5 FL (ref 37–54)
EGFRCR SERPLBLD CKD-EPI 2021: 107.7 ML/MIN/1.73
ERYTHROCYTE [DISTWIDTH] IN BLOOD BY AUTOMATED COUNT: 13.3 % (ref 12.3–15.4)
GLUCOSE BLDC GLUCOMTR-MCNC: 108 MG/DL (ref 70–130)
GLUCOSE BLDC GLUCOMTR-MCNC: 119 MG/DL (ref 70–130)
GLUCOSE BLDC GLUCOMTR-MCNC: 142 MG/DL (ref 70–130)
GLUCOSE BLDC GLUCOMTR-MCNC: 143 MG/DL (ref 70–130)
GLUCOSE SERPL-MCNC: 124 MG/DL (ref 65–99)
HCT VFR BLD AUTO: 35 % (ref 37.5–51)
HGB BLD-MCNC: 11.7 G/DL (ref 13–17.7)
LAB AP CASE REPORT: NORMAL
LAB AP DIAGNOSIS COMMENT: NORMAL
LAB AP SPECIAL STAINS: NORMAL
MCH RBC QN AUTO: 28.7 PG (ref 26.6–33)
MCHC RBC AUTO-ENTMCNC: 33.4 G/DL (ref 31.5–35.7)
MCV RBC AUTO: 86 FL (ref 79–97)
PATH REPORT.FINAL DX SPEC: NORMAL
PATH REPORT.GROSS SPEC: NORMAL
PLATELET # BLD AUTO: 109 10*3/MM3 (ref 140–450)
PMV BLD AUTO: 11.5 FL (ref 6–12)
POTASSIUM SERPL-SCNC: 4.1 MMOL/L (ref 3.5–5.2)
QT INTERVAL: 331 MS
QT INTERVAL: 383 MS
RBC # BLD AUTO: 4.07 10*6/MM3 (ref 4.14–5.8)
SODIUM SERPL-SCNC: 137 MMOL/L (ref 136–145)
WBC NRBC COR # BLD: 11.89 10*3/MM3 (ref 3.4–10.8)

## 2022-08-19 PROCEDURE — 25010000002 AMIODARONE IN DEXTROSE 5% 360-4.14 MG/200ML-% SOLUTION: Performed by: NURSE PRACTITIONER

## 2022-08-19 PROCEDURE — 80048 BASIC METABOLIC PNL TOTAL CA: CPT | Performed by: NURSE PRACTITIONER

## 2022-08-19 PROCEDURE — 71045 X-RAY EXAM CHEST 1 VIEW: CPT

## 2022-08-19 PROCEDURE — 97530 THERAPEUTIC ACTIVITIES: CPT

## 2022-08-19 PROCEDURE — 25010000002 ENOXAPARIN PER 10 MG: Performed by: NURSE PRACTITIONER

## 2022-08-19 PROCEDURE — 94799 UNLISTED PULMONARY SVC/PX: CPT

## 2022-08-19 PROCEDURE — 99232 SBSQ HOSP IP/OBS MODERATE 35: CPT | Performed by: NURSE PRACTITIONER

## 2022-08-19 PROCEDURE — 25010000002 CEFAZOLIN IN DEXTROSE 2-4 GM/100ML-% SOLUTION: Performed by: NURSE PRACTITIONER

## 2022-08-19 PROCEDURE — 82962 GLUCOSE BLOOD TEST: CPT

## 2022-08-19 PROCEDURE — 99024 POSTOP FOLLOW-UP VISIT: CPT | Performed by: THORACIC SURGERY (CARDIOTHORACIC VASCULAR SURGERY)

## 2022-08-19 PROCEDURE — 85027 COMPLETE CBC AUTOMATED: CPT | Performed by: NURSE PRACTITIONER

## 2022-08-19 PROCEDURE — 93005 ELECTROCARDIOGRAM TRACING: CPT | Performed by: THORACIC SURGERY (CARDIOTHORACIC VASCULAR SURGERY)

## 2022-08-19 PROCEDURE — 25010000002 MAGNESIUM SULFATE 2 GM/50ML SOLUTION: Performed by: NURSE PRACTITIONER

## 2022-08-19 PROCEDURE — 93010 ELECTROCARDIOGRAM REPORT: CPT | Performed by: INTERNAL MEDICINE

## 2022-08-19 RX ORDER — MAGNESIUM SULFATE HEPTAHYDRATE 40 MG/ML
2 INJECTION, SOLUTION INTRAVENOUS ONCE
Status: DISCONTINUED | OUTPATIENT
Start: 2022-08-19 | End: 2022-08-19

## 2022-08-19 RX ORDER — POTASSIUM CHLORIDE 750 MG/1
20 TABLET, FILM COATED, EXTENDED RELEASE ORAL DAILY
Status: DISCONTINUED | OUTPATIENT
Start: 2022-08-19 | End: 2022-08-23

## 2022-08-19 RX ORDER — MAGNESIUM SULFATE HEPTAHYDRATE 40 MG/ML
2 INJECTION, SOLUTION INTRAVENOUS ONCE
Status: COMPLETED | OUTPATIENT
Start: 2022-08-19 | End: 2022-08-19

## 2022-08-19 RX ORDER — DOCUSATE SODIUM 100 MG/1
100 CAPSULE, LIQUID FILLED ORAL 2 TIMES DAILY
Status: DISCONTINUED | OUTPATIENT
Start: 2022-08-19 | End: 2022-08-23 | Stop reason: HOSPADM

## 2022-08-19 RX ORDER — FUROSEMIDE 10 MG/ML
40 INJECTION INTRAMUSCULAR; INTRAVENOUS ONCE
Status: DISCONTINUED | OUTPATIENT
Start: 2022-08-19 | End: 2022-08-19

## 2022-08-19 RX ORDER — FUROSEMIDE 40 MG/1
40 TABLET ORAL DAILY
Status: DISCONTINUED | OUTPATIENT
Start: 2022-08-19 | End: 2022-08-23

## 2022-08-19 RX ORDER — POTASSIUM CHLORIDE 750 MG/1
20 TABLET, FILM COATED, EXTENDED RELEASE ORAL ONCE
Status: DISCONTINUED | OUTPATIENT
Start: 2022-08-19 | End: 2022-08-19

## 2022-08-19 RX ORDER — POLYETHYLENE GLYCOL 3350 17 G/17G
17 POWDER, FOR SOLUTION ORAL DAILY
Status: DISCONTINUED | OUTPATIENT
Start: 2022-08-19 | End: 2022-08-23 | Stop reason: SDUPTHER

## 2022-08-19 RX ADMIN — FUROSEMIDE 40 MG: 40 TABLET ORAL at 09:23

## 2022-08-19 RX ADMIN — DOCUSATE SODIUM 50MG AND SENNOSIDES 8.6MG 2 TABLET: 8.6; 5 TABLET, FILM COATED ORAL at 21:05

## 2022-08-19 RX ADMIN — GUAIFENESIN 1200 MG: 600 TABLET, EXTENDED RELEASE ORAL at 21:04

## 2022-08-19 RX ADMIN — AMIODARONE HYDROCHLORIDE 0.5 MG/MIN: 1.8 INJECTION, SOLUTION INTRAVENOUS at 15:13

## 2022-08-19 RX ADMIN — CHLORHEXIDINE GLUCONATE 15 ML: 1.2 SOLUTION ORAL at 21:08

## 2022-08-19 RX ADMIN — ENOXAPARIN SODIUM 40 MG: 100 INJECTION SUBCUTANEOUS at 18:12

## 2022-08-19 RX ADMIN — ATORVASTATIN CALCIUM 40 MG: 20 TABLET, FILM COATED ORAL at 21:05

## 2022-08-19 RX ADMIN — CEFAZOLIN SODIUM 2 G: 2 INJECTION, SOLUTION INTRAVENOUS at 01:30

## 2022-08-19 RX ADMIN — POLYETHYLENE GLYCOL 3350 17 G: 17 POWDER, FOR SOLUTION ORAL at 09:21

## 2022-08-19 RX ADMIN — DOCUSATE SODIUM 50MG AND SENNOSIDES 8.6MG 2 TABLET: 8.6; 5 TABLET, FILM COATED ORAL at 09:21

## 2022-08-19 RX ADMIN — POTASSIUM CHLORIDE 20 MEQ: 750 TABLET, EXTENDED RELEASE ORAL at 09:21

## 2022-08-19 RX ADMIN — ACETAMINOPHEN 650 MG: 325 TABLET, FILM COATED ORAL at 01:30

## 2022-08-19 RX ADMIN — ASPIRIN 81 MG: 81 TABLET, COATED ORAL at 09:19

## 2022-08-19 RX ADMIN — METOPROLOL TARTRATE 25 MG: 25 TABLET, FILM COATED ORAL at 09:21

## 2022-08-19 RX ADMIN — METOPROLOL TARTRATE 12.5 MG: 25 TABLET, FILM COATED ORAL at 06:34

## 2022-08-19 RX ADMIN — DOCUSATE SODIUM 100 MG: 100 CAPSULE, LIQUID FILLED ORAL at 21:05

## 2022-08-19 RX ADMIN — MUPIROCIN 1 APPLICATION: 20 OINTMENT TOPICAL at 09:23

## 2022-08-19 RX ADMIN — GUAIFENESIN 1200 MG: 600 TABLET, EXTENDED RELEASE ORAL at 09:20

## 2022-08-19 RX ADMIN — DOCUSATE SODIUM 100 MG: 100 CAPSULE, LIQUID FILLED ORAL at 09:19

## 2022-08-19 RX ADMIN — AMIODARONE HYDROCHLORIDE 200 MG: 200 TABLET ORAL at 09:19

## 2022-08-19 RX ADMIN — MUPIROCIN: 20 OINTMENT TOPICAL at 21:08

## 2022-08-19 RX ADMIN — METOPROLOL TARTRATE 25 MG: 25 TABLET, FILM COATED ORAL at 21:05

## 2022-08-19 RX ADMIN — PANTOPRAZOLE SODIUM 40 MG: 40 TABLET, DELAYED RELEASE ORAL at 06:34

## 2022-08-19 RX ADMIN — MAGNESIUM SULFATE HEPTAHYDRATE 2 G: 2 INJECTION, SOLUTION INTRAVENOUS at 09:21

## 2022-08-19 RX ADMIN — AMIODARONE HYDROCHLORIDE 200 MG: 200 TABLET ORAL at 21:05

## 2022-08-19 RX ADMIN — AMIODARONE HYDROCHLORIDE 200 MG: 200 TABLET ORAL at 06:33

## 2022-08-20 ENCOUNTER — APPOINTMENT (OUTPATIENT)
Dept: GENERAL RADIOLOGY | Facility: HOSPITAL | Age: 72
End: 2022-08-20

## 2022-08-20 LAB
ANION GAP SERPL CALCULATED.3IONS-SCNC: 8.4 MMOL/L (ref 5–15)
BUN SERPL-MCNC: 14 MG/DL (ref 8–23)
BUN/CREAT SERPL: 30.4 (ref 7–25)
CALCIUM SPEC-SCNC: 8.4 MG/DL (ref 8.6–10.5)
CHLORIDE SERPL-SCNC: 100 MMOL/L (ref 98–107)
CO2 SERPL-SCNC: 27.6 MMOL/L (ref 22–29)
CREAT SERPL-MCNC: 0.46 MG/DL (ref 0.76–1.27)
DEPRECATED RDW RBC AUTO: 40.5 FL (ref 37–54)
EGFRCR SERPLBLD CKD-EPI 2021: 111.1 ML/MIN/1.73
ERYTHROCYTE [DISTWIDTH] IN BLOOD BY AUTOMATED COUNT: 13 % (ref 12.3–15.4)
GLUCOSE BLDC GLUCOMTR-MCNC: 116 MG/DL (ref 70–130)
GLUCOSE BLDC GLUCOMTR-MCNC: 119 MG/DL (ref 70–130)
GLUCOSE BLDC GLUCOMTR-MCNC: 122 MG/DL (ref 70–130)
GLUCOSE SERPL-MCNC: 126 MG/DL (ref 65–99)
HCT VFR BLD AUTO: 33.7 % (ref 37.5–51)
HGB BLD-MCNC: 11.3 G/DL (ref 13–17.7)
MCH RBC QN AUTO: 28.3 PG (ref 26.6–33)
MCHC RBC AUTO-ENTMCNC: 33.5 G/DL (ref 31.5–35.7)
MCV RBC AUTO: 84.3 FL (ref 79–97)
PLATELET # BLD AUTO: 121 10*3/MM3 (ref 140–450)
PMV BLD AUTO: 11.4 FL (ref 6–12)
POTASSIUM SERPL-SCNC: 3.7 MMOL/L (ref 3.5–5.2)
QT INTERVAL: 429 MS
RBC # BLD AUTO: 4 10*6/MM3 (ref 4.14–5.8)
SODIUM SERPL-SCNC: 136 MMOL/L (ref 136–145)
WBC NRBC COR # BLD: 10.78 10*3/MM3 (ref 3.4–10.8)

## 2022-08-20 PROCEDURE — 80048 BASIC METABOLIC PNL TOTAL CA: CPT | Performed by: NURSE PRACTITIONER

## 2022-08-20 PROCEDURE — 99232 SBSQ HOSP IP/OBS MODERATE 35: CPT | Performed by: NURSE PRACTITIONER

## 2022-08-20 PROCEDURE — 71046 X-RAY EXAM CHEST 2 VIEWS: CPT

## 2022-08-20 PROCEDURE — 93010 ELECTROCARDIOGRAM REPORT: CPT | Performed by: INTERNAL MEDICINE

## 2022-08-20 PROCEDURE — 25010000002 AMIODARONE IN DEXTROSE 5% 360-4.14 MG/200ML-% SOLUTION: Performed by: NURSE PRACTITIONER

## 2022-08-20 PROCEDURE — 82962 GLUCOSE BLOOD TEST: CPT

## 2022-08-20 PROCEDURE — 94760 N-INVAS EAR/PLS OXIMETRY 1: CPT

## 2022-08-20 PROCEDURE — 93005 ELECTROCARDIOGRAM TRACING: CPT | Performed by: THORACIC SURGERY (CARDIOTHORACIC VASCULAR SURGERY)

## 2022-08-20 PROCEDURE — 94799 UNLISTED PULMONARY SVC/PX: CPT

## 2022-08-20 PROCEDURE — 25010000002 ENOXAPARIN PER 10 MG: Performed by: NURSE PRACTITIONER

## 2022-08-20 PROCEDURE — 85027 COMPLETE CBC AUTOMATED: CPT | Performed by: NURSE PRACTITIONER

## 2022-08-20 PROCEDURE — 94761 N-INVAS EAR/PLS OXIMETRY MLT: CPT

## 2022-08-20 PROCEDURE — 99024 POSTOP FOLLOW-UP VISIT: CPT | Performed by: THORACIC SURGERY (CARDIOTHORACIC VASCULAR SURGERY)

## 2022-08-20 RX ADMIN — METOPROLOL TARTRATE 25 MG: 25 TABLET, FILM COATED ORAL at 21:04

## 2022-08-20 RX ADMIN — DOCUSATE SODIUM 50MG AND SENNOSIDES 8.6MG 2 TABLET: 8.6; 5 TABLET, FILM COATED ORAL at 21:04

## 2022-08-20 RX ADMIN — ENOXAPARIN SODIUM 40 MG: 100 INJECTION SUBCUTANEOUS at 17:56

## 2022-08-20 RX ADMIN — MUPIROCIN 1 APPLICATION: 20 OINTMENT TOPICAL at 21:06

## 2022-08-20 RX ADMIN — GUAIFENESIN 1200 MG: 600 TABLET, EXTENDED RELEASE ORAL at 10:35

## 2022-08-20 RX ADMIN — ASPIRIN 81 MG: 81 TABLET, COATED ORAL at 10:29

## 2022-08-20 RX ADMIN — PANTOPRAZOLE SODIUM 40 MG: 40 TABLET, DELAYED RELEASE ORAL at 05:39

## 2022-08-20 RX ADMIN — AMIODARONE HYDROCHLORIDE 0.5 MG/MIN: 1.8 INJECTION, SOLUTION INTRAVENOUS at 01:40

## 2022-08-20 RX ADMIN — POTASSIUM CHLORIDE 20 MEQ: 750 TABLET, EXTENDED RELEASE ORAL at 10:29

## 2022-08-20 RX ADMIN — AMIODARONE HYDROCHLORIDE 200 MG: 200 TABLET ORAL at 21:05

## 2022-08-20 RX ADMIN — GUAIFENESIN 1200 MG: 600 TABLET, EXTENDED RELEASE ORAL at 21:04

## 2022-08-20 RX ADMIN — MUPIROCIN 1 APPLICATION: 20 OINTMENT TOPICAL at 10:34

## 2022-08-20 RX ADMIN — FUROSEMIDE 40 MG: 40 TABLET ORAL at 10:29

## 2022-08-20 RX ADMIN — METOPROLOL TARTRATE 25 MG: 25 TABLET, FILM COATED ORAL at 10:29

## 2022-08-20 RX ADMIN — AMIODARONE HYDROCHLORIDE 200 MG: 200 TABLET ORAL at 10:29

## 2022-08-20 RX ADMIN — ATORVASTATIN CALCIUM 40 MG: 20 TABLET, FILM COATED ORAL at 21:05

## 2022-08-20 RX ADMIN — BISACODYL 10 MG: 5 TABLET ORAL at 05:39

## 2022-08-21 LAB
ANION GAP SERPL CALCULATED.3IONS-SCNC: 9.9 MMOL/L (ref 5–15)
BUN SERPL-MCNC: 13 MG/DL (ref 8–23)
BUN/CREAT SERPL: 20.6 (ref 7–25)
CALCIUM SPEC-SCNC: 8.6 MG/DL (ref 8.6–10.5)
CHLORIDE SERPL-SCNC: 99 MMOL/L (ref 98–107)
CO2 SERPL-SCNC: 26.1 MMOL/L (ref 22–29)
CREAT SERPL-MCNC: 0.63 MG/DL (ref 0.76–1.27)
DEPRECATED RDW RBC AUTO: 40.9 FL (ref 37–54)
EGFRCR SERPLBLD CKD-EPI 2021: 101.1 ML/MIN/1.73
ERYTHROCYTE [DISTWIDTH] IN BLOOD BY AUTOMATED COUNT: 13.1 % (ref 12.3–15.4)
GLUCOSE BLDC GLUCOMTR-MCNC: 114 MG/DL (ref 70–130)
GLUCOSE SERPL-MCNC: 113 MG/DL (ref 65–99)
HCT VFR BLD AUTO: 34.2 % (ref 37.5–51)
HGB BLD-MCNC: 11.2 G/DL (ref 13–17.7)
MAGNESIUM SERPL-MCNC: 2 MG/DL (ref 1.6–2.4)
MCH RBC QN AUTO: 28.3 PG (ref 26.6–33)
MCHC RBC AUTO-ENTMCNC: 32.7 G/DL (ref 31.5–35.7)
MCV RBC AUTO: 86.4 FL (ref 79–97)
PLATELET # BLD AUTO: 146 10*3/MM3 (ref 140–450)
PMV BLD AUTO: 11.1 FL (ref 6–12)
POTASSIUM SERPL-SCNC: 3.9 MMOL/L (ref 3.5–5.2)
QT INTERVAL: 347 MS
RBC # BLD AUTO: 3.96 10*6/MM3 (ref 4.14–5.8)
SODIUM SERPL-SCNC: 135 MMOL/L (ref 136–145)
WBC NRBC COR # BLD: 9.47 10*3/MM3 (ref 3.4–10.8)

## 2022-08-21 PROCEDURE — 94799 UNLISTED PULMONARY SVC/PX: CPT

## 2022-08-21 PROCEDURE — 83735 ASSAY OF MAGNESIUM: CPT | Performed by: NURSE PRACTITIONER

## 2022-08-21 PROCEDURE — 25010000002 AMIODARONE IN DEXTROSE 5% 360-4.14 MG/200ML-% SOLUTION: Performed by: THORACIC SURGERY (CARDIOTHORACIC VASCULAR SURGERY)

## 2022-08-21 PROCEDURE — 25010000002 AMIODARONE IN DEXTROSE 5% 150-4.21 MG/100ML-% SOLUTION: Performed by: THORACIC SURGERY (CARDIOTHORACIC VASCULAR SURGERY)

## 2022-08-21 PROCEDURE — 94761 N-INVAS EAR/PLS OXIMETRY MLT: CPT

## 2022-08-21 PROCEDURE — 99232 SBSQ HOSP IP/OBS MODERATE 35: CPT | Performed by: NURSE PRACTITIONER

## 2022-08-21 PROCEDURE — 25010000002 ENOXAPARIN PER 10 MG: Performed by: NURSE PRACTITIONER

## 2022-08-21 PROCEDURE — 25010000002 MAGNESIUM SULFATE 2 GM/50ML SOLUTION: Performed by: NURSE PRACTITIONER

## 2022-08-21 PROCEDURE — 85027 COMPLETE CBC AUTOMATED: CPT | Performed by: NURSE PRACTITIONER

## 2022-08-21 PROCEDURE — 93005 ELECTROCARDIOGRAM TRACING: CPT | Performed by: INTERNAL MEDICINE

## 2022-08-21 PROCEDURE — 97110 THERAPEUTIC EXERCISES: CPT | Performed by: PHYSICAL THERAPIST

## 2022-08-21 PROCEDURE — 82962 GLUCOSE BLOOD TEST: CPT

## 2022-08-21 PROCEDURE — 93010 ELECTROCARDIOGRAM REPORT: CPT | Performed by: INTERNAL MEDICINE

## 2022-08-21 PROCEDURE — 99024 POSTOP FOLLOW-UP VISIT: CPT | Performed by: THORACIC SURGERY (CARDIOTHORACIC VASCULAR SURGERY)

## 2022-08-21 PROCEDURE — 94760 N-INVAS EAR/PLS OXIMETRY 1: CPT

## 2022-08-21 PROCEDURE — 80048 BASIC METABOLIC PNL TOTAL CA: CPT | Performed by: NURSE PRACTITIONER

## 2022-08-21 RX ORDER — POTASSIUM CHLORIDE 750 MG/1
20 TABLET, FILM COATED, EXTENDED RELEASE ORAL ONCE
Status: COMPLETED | OUTPATIENT
Start: 2022-08-21 | End: 2022-08-21

## 2022-08-21 RX ORDER — MAGNESIUM SULFATE HEPTAHYDRATE 40 MG/ML
2 INJECTION, SOLUTION INTRAVENOUS ONCE
Status: COMPLETED | OUTPATIENT
Start: 2022-08-21 | End: 2022-08-21

## 2022-08-21 RX ADMIN — ENOXAPARIN SODIUM 40 MG: 100 INJECTION SUBCUTANEOUS at 16:37

## 2022-08-21 RX ADMIN — DOCUSATE SODIUM 100 MG: 100 CAPSULE, LIQUID FILLED ORAL at 20:08

## 2022-08-21 RX ADMIN — MUPIROCIN 1 APPLICATION: 20 OINTMENT TOPICAL at 20:07

## 2022-08-21 RX ADMIN — POLYETHYLENE GLYCOL 3350 17 G: 17 POWDER, FOR SOLUTION ORAL at 08:25

## 2022-08-21 RX ADMIN — GUAIFENESIN 1200 MG: 600 TABLET, EXTENDED RELEASE ORAL at 20:08

## 2022-08-21 RX ADMIN — CHLORHEXIDINE GLUCONATE 15 ML: 1.2 SOLUTION ORAL at 20:08

## 2022-08-21 RX ADMIN — POTASSIUM CHLORIDE 20 MEQ: 750 TABLET, EXTENDED RELEASE ORAL at 08:25

## 2022-08-21 RX ADMIN — MAGNESIUM SULFATE HEPTAHYDRATE 2 G: 2 INJECTION, SOLUTION INTRAVENOUS at 08:57

## 2022-08-21 RX ADMIN — AMIODARONE HYDROCHLORIDE 200 MG: 200 TABLET ORAL at 08:25

## 2022-08-21 RX ADMIN — AMIODARONE HYDROCHLORIDE 150 MG: 1.5 INJECTION, SOLUTION INTRAVENOUS at 10:18

## 2022-08-21 RX ADMIN — CHLORHEXIDINE GLUCONATE 15 ML: 1.2 SOLUTION ORAL at 08:25

## 2022-08-21 RX ADMIN — GUAIFENESIN 1200 MG: 600 TABLET, EXTENDED RELEASE ORAL at 08:25

## 2022-08-21 RX ADMIN — METOPROLOL TARTRATE 25 MG: 25 TABLET, FILM COATED ORAL at 08:26

## 2022-08-21 RX ADMIN — POTASSIUM CHLORIDE 20 MEQ: 750 TABLET, EXTENDED RELEASE ORAL at 08:57

## 2022-08-21 RX ADMIN — DOCUSATE SODIUM 100 MG: 100 CAPSULE, LIQUID FILLED ORAL at 08:26

## 2022-08-21 RX ADMIN — METOPROLOL TARTRATE 25 MG: 25 TABLET, FILM COATED ORAL at 20:07

## 2022-08-21 RX ADMIN — ATORVASTATIN CALCIUM 40 MG: 20 TABLET, FILM COATED ORAL at 20:07

## 2022-08-21 RX ADMIN — AMIODARONE HYDROCHLORIDE 0.5 MG/MIN: 1.8 INJECTION, SOLUTION INTRAVENOUS at 16:37

## 2022-08-21 RX ADMIN — DOCUSATE SODIUM 50MG AND SENNOSIDES 8.6MG 2 TABLET: 8.6; 5 TABLET, FILM COATED ORAL at 08:25

## 2022-08-21 RX ADMIN — ASPIRIN 81 MG: 81 TABLET, COATED ORAL at 08:25

## 2022-08-21 RX ADMIN — DOCUSATE SODIUM 50MG AND SENNOSIDES 8.6MG 2 TABLET: 8.6; 5 TABLET, FILM COATED ORAL at 20:08

## 2022-08-21 RX ADMIN — FUROSEMIDE 40 MG: 40 TABLET ORAL at 08:25

## 2022-08-21 RX ADMIN — MUPIROCIN 1 APPLICATION: 20 OINTMENT TOPICAL at 08:25

## 2022-08-21 RX ADMIN — AMIODARONE HYDROCHLORIDE 1 MG/MIN: 1.8 INJECTION, SOLUTION INTRAVENOUS at 10:27

## 2022-08-22 LAB
ANION GAP SERPL CALCULATED.3IONS-SCNC: 10.4 MMOL/L (ref 5–15)
BUN SERPL-MCNC: 15 MG/DL (ref 8–23)
BUN/CREAT SERPL: 24.6 (ref 7–25)
CALCIUM SPEC-SCNC: 8.5 MG/DL (ref 8.6–10.5)
CHLORIDE SERPL-SCNC: 101 MMOL/L (ref 98–107)
CO2 SERPL-SCNC: 26.6 MMOL/L (ref 22–29)
CREAT SERPL-MCNC: 0.61 MG/DL (ref 0.76–1.27)
EGFRCR SERPLBLD CKD-EPI 2021: 102.1 ML/MIN/1.73
GLUCOSE SERPL-MCNC: 117 MG/DL (ref 65–99)
POTASSIUM SERPL-SCNC: 4.4 MMOL/L (ref 3.5–5.2)
QT INTERVAL: 375 MS
SODIUM SERPL-SCNC: 138 MMOL/L (ref 136–145)

## 2022-08-22 PROCEDURE — 93010 ELECTROCARDIOGRAM REPORT: CPT | Performed by: INTERNAL MEDICINE

## 2022-08-22 PROCEDURE — 99232 SBSQ HOSP IP/OBS MODERATE 35: CPT | Performed by: NURSE PRACTITIONER

## 2022-08-22 PROCEDURE — 25010000002 ENOXAPARIN PER 10 MG: Performed by: NURSE PRACTITIONER

## 2022-08-22 PROCEDURE — 80048 BASIC METABOLIC PNL TOTAL CA: CPT | Performed by: NURSE PRACTITIONER

## 2022-08-22 PROCEDURE — 93005 ELECTROCARDIOGRAM TRACING: CPT | Performed by: NURSE PRACTITIONER

## 2022-08-22 PROCEDURE — 99024 POSTOP FOLLOW-UP VISIT: CPT | Performed by: THORACIC SURGERY (CARDIOTHORACIC VASCULAR SURGERY)

## 2022-08-22 PROCEDURE — 97116 GAIT TRAINING THERAPY: CPT

## 2022-08-22 PROCEDURE — 25010000002 AMIODARONE IN DEXTROSE 5% 360-4.14 MG/200ML-% SOLUTION: Performed by: THORACIC SURGERY (CARDIOTHORACIC VASCULAR SURGERY)

## 2022-08-22 RX ORDER — ATENOLOL 25 MG/1
12.5 TABLET ORAL EVERY 12 HOURS SCHEDULED
Status: DISCONTINUED | OUTPATIENT
Start: 2022-08-22 | End: 2022-08-23 | Stop reason: HOSPADM

## 2022-08-22 RX ORDER — AMIODARONE HYDROCHLORIDE 200 MG/1
300 TABLET ORAL EVERY 12 HOURS SCHEDULED
Status: DISCONTINUED | OUTPATIENT
Start: 2022-08-22 | End: 2022-08-23

## 2022-08-22 RX ADMIN — DOCUSATE SODIUM 100 MG: 100 CAPSULE, LIQUID FILLED ORAL at 20:23

## 2022-08-22 RX ADMIN — AMIODARONE HYDROCHLORIDE 300 MG: 200 TABLET ORAL at 20:23

## 2022-08-22 RX ADMIN — ATORVASTATIN CALCIUM 40 MG: 20 TABLET, FILM COATED ORAL at 20:23

## 2022-08-22 RX ADMIN — MUPIROCIN: 20 OINTMENT TOPICAL at 20:24

## 2022-08-22 RX ADMIN — ASPIRIN 81 MG: 81 TABLET, COATED ORAL at 09:45

## 2022-08-22 RX ADMIN — DOCUSATE SODIUM 50MG AND SENNOSIDES 8.6MG 2 TABLET: 8.6; 5 TABLET, FILM COATED ORAL at 20:23

## 2022-08-22 RX ADMIN — ATENOLOL 12.5 MG: 25 TABLET ORAL at 09:45

## 2022-08-22 RX ADMIN — POTASSIUM CHLORIDE 20 MEQ: 750 TABLET, EXTENDED RELEASE ORAL at 09:46

## 2022-08-22 RX ADMIN — MUPIROCIN 1 APPLICATION: 20 OINTMENT TOPICAL at 09:47

## 2022-08-22 RX ADMIN — GUAIFENESIN 1200 MG: 600 TABLET, EXTENDED RELEASE ORAL at 20:23

## 2022-08-22 RX ADMIN — ENOXAPARIN SODIUM 40 MG: 100 INJECTION SUBCUTANEOUS at 18:18

## 2022-08-22 RX ADMIN — AMIODARONE HYDROCHLORIDE 300 MG: 200 TABLET ORAL at 10:14

## 2022-08-22 RX ADMIN — GUAIFENESIN 1200 MG: 600 TABLET, EXTENDED RELEASE ORAL at 09:44

## 2022-08-22 RX ADMIN — POLYETHYLENE GLYCOL 3350 17 G: 17 POWDER, FOR SOLUTION ORAL at 09:45

## 2022-08-22 RX ADMIN — FUROSEMIDE 40 MG: 40 TABLET ORAL at 09:44

## 2022-08-22 RX ADMIN — AMIODARONE HYDROCHLORIDE 0.5 MG/MIN: 1.8 INJECTION, SOLUTION INTRAVENOUS at 04:41

## 2022-08-23 ENCOUNTER — HOME HEALTH ADMISSION (OUTPATIENT)
Dept: HOME HEALTH SERVICES | Facility: HOME HEALTHCARE | Age: 72
End: 2022-08-23

## 2022-08-23 VITALS
HEART RATE: 59 BPM | OXYGEN SATURATION: 97 % | WEIGHT: 163.1 LBS | RESPIRATION RATE: 17 BRPM | DIASTOLIC BLOOD PRESSURE: 68 MMHG | HEIGHT: 67 IN | SYSTOLIC BLOOD PRESSURE: 98 MMHG | BODY MASS INDEX: 25.6 KG/M2 | TEMPERATURE: 97.6 F

## 2022-08-23 LAB
ANION GAP SERPL CALCULATED.3IONS-SCNC: 11.2 MMOL/L (ref 5–15)
BUN SERPL-MCNC: 15 MG/DL (ref 8–23)
BUN/CREAT SERPL: 25.9 (ref 7–25)
CALCIUM SPEC-SCNC: 8.6 MG/DL (ref 8.6–10.5)
CHLORIDE SERPL-SCNC: 102 MMOL/L (ref 98–107)
CO2 SERPL-SCNC: 25.8 MMOL/L (ref 22–29)
CREAT SERPL-MCNC: 0.58 MG/DL (ref 0.76–1.27)
EGFRCR SERPLBLD CKD-EPI 2021: 103.6 ML/MIN/1.73
GLUCOSE SERPL-MCNC: 106 MG/DL (ref 65–99)
POTASSIUM SERPL-SCNC: 4.2 MMOL/L (ref 3.5–5.2)
SODIUM SERPL-SCNC: 139 MMOL/L (ref 136–145)

## 2022-08-23 PROCEDURE — 99024 POSTOP FOLLOW-UP VISIT: CPT | Performed by: THORACIC SURGERY (CARDIOTHORACIC VASCULAR SURGERY)

## 2022-08-23 PROCEDURE — 80048 BASIC METABOLIC PNL TOTAL CA: CPT | Performed by: NURSE PRACTITIONER

## 2022-08-23 PROCEDURE — 99232 SBSQ HOSP IP/OBS MODERATE 35: CPT | Performed by: NURSE PRACTITIONER

## 2022-08-23 RX ORDER — DIGOXIN 125 MCG
125 TABLET ORAL
Status: DISCONTINUED | OUTPATIENT
Start: 2022-08-23 | End: 2022-08-23 | Stop reason: HOSPADM

## 2022-08-23 RX ORDER — POLYETHYLENE GLYCOL 3350 17 G/17G
17 POWDER, FOR SOLUTION ORAL DAILY
Status: DISCONTINUED | OUTPATIENT
Start: 2022-08-23 | End: 2022-08-23 | Stop reason: HOSPADM

## 2022-08-23 RX ORDER — HYDROCODONE BITARTRATE AND ACETAMINOPHEN 5; 325 MG/1; MG/1
1 TABLET ORAL EVERY 4 HOURS PRN
Qty: 42 TABLET | Refills: 0 | Status: SHIPPED | OUTPATIENT
Start: 2022-08-23 | End: 2022-08-26

## 2022-08-23 RX ORDER — DIGOXIN 125 MCG
125 TABLET ORAL
Qty: 30 TABLET | Refills: 2 | Status: SHIPPED | OUTPATIENT
Start: 2022-08-24

## 2022-08-23 RX ORDER — NITROGLYCERIN 0.4 MG/1
0.4 TABLET SUBLINGUAL
Status: DISCONTINUED | OUTPATIENT
Start: 2022-08-23 | End: 2022-08-23 | Stop reason: HOSPADM

## 2022-08-23 RX ORDER — ASPIRIN 81 MG/1
81 TABLET ORAL DAILY
Qty: 30 TABLET | Refills: 2 | Status: SHIPPED | OUTPATIENT
Start: 2022-08-24

## 2022-08-23 RX ORDER — ATENOLOL 25 MG/1
25 TABLET ORAL DAILY
Qty: 30 TABLET | Refills: 2 | Status: SHIPPED | OUTPATIENT
Start: 2022-08-23 | End: 2022-11-14 | Stop reason: SDUPTHER

## 2022-08-23 RX ADMIN — MUPIROCIN 1 APPLICATION: 20 OINTMENT TOPICAL at 08:46

## 2022-08-23 RX ADMIN — POLYETHYLENE GLYCOL 3350 17 G: 17 POWDER, FOR SOLUTION ORAL at 12:16

## 2022-08-23 RX ADMIN — GUAIFENESIN 1200 MG: 600 TABLET, EXTENDED RELEASE ORAL at 08:46

## 2022-08-23 RX ADMIN — ATENOLOL 12.5 MG: 25 TABLET ORAL at 08:46

## 2022-08-23 RX ADMIN — APIXABAN 2.5 MG: 2.5 TABLET, FILM COATED ORAL at 13:56

## 2022-08-23 RX ADMIN — ASPIRIN 81 MG: 81 TABLET, COATED ORAL at 08:46

## 2022-08-23 RX ADMIN — AMIODARONE HYDROCHLORIDE 300 MG: 200 TABLET ORAL at 08:46

## 2022-08-24 ENCOUNTER — HOME CARE VISIT (OUTPATIENT)
Dept: HOME HEALTH SERVICES | Facility: HOME HEALTHCARE | Age: 72
End: 2022-08-24

## 2022-08-24 LAB
BH BB BLOOD EXPIRATION DATE: NORMAL
BH BB BLOOD EXPIRATION DATE: NORMAL
BH BB BLOOD TYPE BARCODE: 8400
BH BB BLOOD TYPE BARCODE: 8400
BH BB DISPENSE STATUS: NORMAL
BH BB DISPENSE STATUS: NORMAL
BH BB PRODUCT CODE: NORMAL
BH BB PRODUCT CODE: NORMAL
BH BB UNIT NUMBER: NORMAL
BH BB UNIT NUMBER: NORMAL
CROSSMATCH INTERPRETATION: NORMAL
CROSSMATCH INTERPRETATION: NORMAL
UNIT  ABO: NORMAL
UNIT  ABO: NORMAL
UNIT  RH: NORMAL
UNIT  RH: NORMAL

## 2022-08-24 PROCEDURE — G0299 HHS/HOSPICE OF RN EA 15 MIN: HCPCS

## 2022-08-24 NOTE — HOME HEALTH
71M with recent AVR.  History of arthritis, paroxysmal atrial fibrillation, and aortic stenosis.  Midline sternal incision and RUQ lap incisions healing well.  Slight bilateral ankle edema observed.  Medications reconciled, are in home, and patient is compliant with regime.  Ambulates without assistance.  Lives at home with spouse.  SN to T/I AVR post op care and medication regime.

## 2022-08-25 VITALS
DIASTOLIC BLOOD PRESSURE: 78 MMHG | SYSTOLIC BLOOD PRESSURE: 122 MMHG | HEART RATE: 55 BPM | RESPIRATION RATE: 18 BRPM | TEMPERATURE: 97 F | OXYGEN SATURATION: 96 %

## 2022-08-26 ENCOUNTER — OFFICE VISIT (OUTPATIENT)
Dept: CARDIOLOGY | Facility: CLINIC | Age: 72
End: 2022-08-26

## 2022-08-26 ENCOUNTER — HOSPITAL ENCOUNTER (OUTPATIENT)
Dept: GENERAL RADIOLOGY | Facility: HOSPITAL | Age: 72
Discharge: HOME | End: 2022-08-26
Admitting: NURSE PRACTITIONER

## 2022-08-26 VITALS
HEART RATE: 63 BPM | SYSTOLIC BLOOD PRESSURE: 128 MMHG | WEIGHT: 170 LBS | HEIGHT: 67 IN | DIASTOLIC BLOOD PRESSURE: 76 MMHG | BODY MASS INDEX: 26.68 KG/M2 | OXYGEN SATURATION: 96 %

## 2022-08-26 DIAGNOSIS — I35.0 NONRHEUMATIC AORTIC VALVE STENOSIS: ICD-10-CM

## 2022-08-26 DIAGNOSIS — I35.0 NONRHEUMATIC AORTIC VALVE STENOSIS: Primary | ICD-10-CM

## 2022-08-26 DIAGNOSIS — E78.5 HYPERLIPIDEMIA LDL GOAL <100: ICD-10-CM

## 2022-08-26 DIAGNOSIS — I35.0 AORTIC VALVE STENOSIS, ETIOLOGY OF CARDIAC VALVE DISEASE UNSPECIFIED: ICD-10-CM

## 2022-08-26 DIAGNOSIS — Z86.79 HISTORY OF ATRIAL FIBRILLATION: ICD-10-CM

## 2022-08-26 PROBLEM — Z95.3 S/P AORTIC VALVE REPLACEMENT WITH TISSUE: Status: ACTIVE | Noted: 2022-08-26

## 2022-08-26 PROCEDURE — 99214 OFFICE O/P EST MOD 30 MIN: CPT | Performed by: NURSE PRACTITIONER

## 2022-08-26 PROCEDURE — 93000 ELECTROCARDIOGRAM COMPLETE: CPT | Performed by: NURSE PRACTITIONER

## 2022-08-26 PROCEDURE — 71046 X-RAY EXAM CHEST 2 VIEWS: CPT

## 2022-08-26 RX ORDER — FUROSEMIDE 20 MG/1
20 TABLET ORAL DAILY
Qty: 30 TABLET | Refills: 11 | Status: SHIPPED | OUTPATIENT
Start: 2022-08-26 | End: 2022-09-14

## 2022-08-26 NOTE — PROGRESS NOTES
Date of Office Visit: 2022  Encounter Provider: KAITY Buck  Place of Service: [facility] CARDIOLOGY  Patient Name: Hieu Mann  :1950    Chief Complaint   Patient presents with   • Follow-up   • Hyperlipidemia   :     HPI: Hieu Mann is a 71 y.o. male who is a patient of Dr. Quintana and is known to me from hospitalization.  On  he underwent a minimally invasive upper J sternotomy AVR with a 25 mm magna pericardial tissue prosthesis with Dr. Traore.  This was due to a bicuspid aortic valve.  Postop he did well.  He did have some postop A. fib and was put on IV amiodarone.  He had it paroxysmal a but it had to be discontinued due to prolonged QT interval and he was put on digoxin for rate control.  He was discharged home on anticoagulation with Eliquis.    He comes in today for follow-up.  He is developing some edema in both his legs and a dry cough.  He sleeps in a recliner chair but not because he is short of breath he wants to elevate his legs for his swelling.  He is in sinus rhythm today on his EKG.  Previous testing and notes have been reviewed by me.   Past Medical History:   Diagnosis Date   • Aortic valve stenosis    • Arthritis    • Elevated cholesterol    • Epigastric hernia    • Heart murmur     NO TREATMENT   • History of atrial fibrillation    • History of irregular heartbeat     A-FIB   • History of [place] spotted fever    • Hyperlipidemia    • Swelling     RIGHT HIP AREA       Past Surgical History:   Procedure Laterality Date   • AORTIC VALVE REPAIR/REPLACEMENT N/A 2022    Procedure: MINI STERNOTOMY; AORTIC VALVE REPLACEMENT; TRANSESOPHAGEAL ECHOCARDIOGRAM; PRP;  Surgeon: Corwin Traore MD;  Location: [facility] CVOR;  Service: Cardiothoracic;  Laterality: N/A;   • CARDIAC CATHETERIZATION N/A 2022    Procedure: Coronary angiography;  Surgeon: Master Quintana MD;  Location: [facility] CATH INVASIVE  LOCATION;  Service: Cardiology;  Laterality: N/A;   • CATARACT EXTRACTION WITH INTRAOCULAR LENS IMPLANT Bilateral    • COLONOSCOPY N/A 2014    DR. HONEYCUTT   • EPIGASTRIC HERNIA REPAIR N/A 3/9/2018    Procedure: EPIGASTRIC HERNIA REPAIR;  Surgeon: David Finn MD;  Location: [facility];  Service: General   • LAPAROSCOPIC INGUINAL HERNIA REPAIR Left 03/27/2015    Dr. Hieu Khan   • REPLACEMENT TOTAL KNEE Left 08/06/2012    Dr. David Stein   • REPLACEMENT TOTAL KNEE Right 11/12/2003    Right total knee replacement, cementless Young Profix-Dr. David Stein   • TOTAL HIP ARTHROPLASTY Right 05/11/2009    Dr. David Stein   • TOTAL HIP ARTHROPLASTY Left 02/24/2011    Dr. David Stein   • TOTAL HIP ARTHROPLASTY Right 12/15/2020    Procedure: RT. TOTAL HIP ARTHROPLASTY;  Surgeon: David Stein II, MD;  Location: [facility];  Service: Orthopedics;  Laterality: Right;       Social History     Socioeconomic History   • Marital status:    Tobacco Use   • Smoking status: Never Smoker   • Smokeless tobacco: Never Used   • Tobacco comment: CAFFEINE USE: 1 CUP COFFEE DAILY   Vaping Use   • Vaping Use: Never used   Substance and Sexual Activity   • Alcohol use: Yes     Comment: ONE BEER ONCE MONTH   • Drug use: No   • Sexual activity: Defer       Family History   Problem Relation Age of Onset   • Lung cancer Father    • Malig Hyperthermia Neg Hx        Review of Systems   Constitutional: Positive for malaise/fatigue. Negative for diaphoresis.   Cardiovascular: Positive for syncope. Negative for chest pain, claudication, dyspnea on exertion, irregular heartbeat, leg swelling, near-syncope, orthopnea, palpitations and paroxysmal nocturnal dyspnea.   Respiratory: Negative for cough, shortness of breath and sleep disturbances due to breathing.    Musculoskeletal: Negative for falls.   Neurological: Negative for dizziness and weakness.   Psychiatric/Behavioral: Negative for altered mental status and  "substance abuse.       Allergies   Allergen Reactions   • Simvastatin Myalgia     Other reaction(s): Muscle pain         Current Outpatient Medications:   •  apixaban (ELIQUIS) 2.5 MG tablet tablet, Take 1 tablet by mouth Every 12 (Twelve) Hours for 14 days. Indications: Atrial Fibrillation, Disp: 28 tablet, Rfl: 0  •  aspirin 81 MG EC tablet, Take 1 tablet by mouth Daily., Disp: 30 tablet, Rfl: 2  •  atenolol (TENORMIN) 25 MG tablet, Take 1 tablet by mouth Daily., Disp: 30 tablet, Rfl: 2  •  atorvastatin (LIPITOR) 20 MG tablet, Take 20 mg by mouth Every Night., Disp: , Rfl:   •  Cholecalciferol (VITAMIN D3 PO), Take 1 tablet by mouth Daily. HOLDING FOR SURGERY, Disp: , Rfl:   •  digoxin (LANOXIN) 125 MCG tablet, Take 1 tablet by mouth Daily., Disp: 30 tablet, Rfl: 2  •  Multiple Vitamins-Minerals (CENTRUM SILVER ADULT 50+ PO), Take 1 tablet by mouth Daily. HOLDING FOR SURGERY, Disp: , Rfl:   •  multivitamin with minerals tablet tablet, Take 1 tablet by mouth 2 (Two) Times a Day., Disp: , Rfl:   •  sildenafil (VIAGRA) 25 MG tablet, Take 25 mg by mouth Daily As Needed for Erectile Dysfunction. HOLDING FOR SURGERY, Disp: , Rfl:   No current facility-administered medications for this visit.    Facility-Administered Medications Ordered in Other Visits:   •  Chlorhexidine Gluconate Cloth 2 % pads 1 application, 1 application, Topical, Q12H GONZALON, Tamar Alvarez APRN      Objective:     Vitals:    08/26/22 1455   BP: 128/76   BP Location: Right arm   Patient Position: Sitting   Cuff Size: Adult   Pulse: 63   SpO2: 96%   Weight: 77.1 kg (170 lb)   Height: 170.2 cm (67\")     Body mass index is 26.63 kg/m².    PHYSICAL EXAM:    Constitutional:       General: Not in acute distress.     Appearance: Normal appearance. Well-developed.   Eyes:      Pupils: Pupils are equal, round, and reactive to light.   HENT:      Head: Normocephalic.   Neck:      Vascular: No carotid bruit or JVD.   Pulmonary:      Effort: Pulmonary effort is " normal. No tachypnea.      Breath sounds: Normal breath sounds. No wheezing. No rales.   Chest:       Cardiovascular:      Normal rate. Regular rhythm.      No gallop.   Pulses:     Intact distal pulses.   Edema:     Peripheral edema present.     Pretibial: bilateral 1+ edema of the pretibial area.     Ankle: bilateral 1+ edema of the ankle.  Abdominal:      General: Bowel sounds are normal.      Palpations: Abdomen is soft.      Tenderness: There is no abdominal tenderness.   Musculoskeletal: Normal range of motion.      Cervical back: Normal range of motion and neck supple. No edema. Skin:     General: Skin is warm and dry.   Neurological:      Mental Status: Alert and oriented to person, place, and time.           ECG 12 Lead    Date/Time: 8/26/2022 3:07 PM  Performed by: Jane Cortes APRN  Authorized by: Jane Cortes APRN   Rhythm: sinus rhythm  Rate: normal  QRS axis: normal    Clinical impression: normal ECG              Assessment:       Diagnosis Plan   1. Nonrheumatic aortic valve stenosis     2. Aortic valve stenosis, etiology of cardiac valve disease unspecified     3. Hyperlipidemia LDL goal <100       Orders Placed This Encounter   Procedures   • ECG 12 Lead     This order was created via procedure documentation     Order Specific Question:   Release to patient     Answer:   Routine Release          Plan:       Going to put him on 20 Lasix a day.  We will get labs in 1 week.  I am also going to get a chest x-ray on exam he is got some popping in the right upper chest and I want to make sure there is no sternal malalignment.  However he is asymptomatic.  He has an appointment for follow-up in October on the third with Dr. Quintana and a surgical follow-up next week.         Your medication list          Accurate as of August 26, 2022  3:08 PM. If you have any questions, ask your nurse or doctor.            CONTINUE taking these medications      Instructions Last Dose Given Next Dose Due    Aspirin Adult Low Strength 81 MG EC tablet  Generic drug: aspirin      Take 1 tablet by mouth Daily.       atenolol 25 MG tablet  Commonly known as: TENORMIN      Take 1 tablet by mouth Daily.       atorvastatin 20 MG tablet  Commonly known as: LIPITOR      Take 20 mg by mouth Every Night.       digoxin 125 MCG tablet  Commonly known as: LANOXIN      Take 1 tablet by mouth Daily.       Eliquis 2.5 MG tablet tablet  Generic drug: apixaban      Take 1 tablet by mouth Every 12 (Twelve) Hours for 14 days. Indications: Atrial Fibrillation       multivitamin with minerals tablet tablet      Take 1 tablet by mouth Daily. HOLDING FOR SURGERY       multivitamin with minerals tablet tablet      Take 1 tablet by mouth 2 (Two) Times a Day.       sildenafil 25 MG tablet  Commonly known as: VIAGRA      Take 25 mg by mouth Daily As Needed for Erectile Dysfunction. HOLDING FOR SURGERY       VITAMIN D3 PO      Take 1 tablet by mouth Daily. HOLDING FOR SURGERY          STOP taking these medications    HYDROcodone-acetaminophen 5-325 MG per tablet  Commonly known as: NORCO  Stopped by: KAITY Buck                 As always, it has been a pleasure to participate in your patient's care.      Sincerely,     Jane BRICENO

## 2022-08-29 ENCOUNTER — TELEPHONE (OUTPATIENT)
Dept: CARDIOLOGY | Facility: CLINIC | Age: 72
End: 2022-08-29

## 2022-08-29 NOTE — TELEPHONE ENCOUNTER
Spoke with pt. Since he has started his lasix 8/26 he noticed he has been losing some weight. He has lost 5 pounds since he's been on. Pt has no others symptoms.

## 2022-08-30 ENCOUNTER — HOME CARE VISIT (OUTPATIENT)
Dept: HOME HEALTH SERVICES | Facility: HOME HEALTHCARE | Age: 72
End: 2022-08-30

## 2022-08-30 VITALS
TEMPERATURE: 98.1 F | HEART RATE: 58 BPM | SYSTOLIC BLOOD PRESSURE: 102 MMHG | WEIGHT: 154.25 LBS | DIASTOLIC BLOOD PRESSURE: 60 MMHG | BODY MASS INDEX: 24.16 KG/M2 | OXYGEN SATURATION: 98 % | RESPIRATION RATE: 16 BRPM

## 2022-08-30 PROCEDURE — G0495 RN CARE TRAIN/EDU IN HH: HCPCS

## 2022-08-30 NOTE — HOME HEALTH
S/P AVR- ASSESS/INSTRUCT POST OP CARES    HX-71M with recent AVR.  History of arthritis, paroxysmal atrial fibrillation, and aortic stenosis.  Midline sternal incision and RUQ lap incisions healing well.  Slight bilateral ankle edema observed.  Medications reconciled, are in home, and patient is compliant with regime.  Ambulates without assistance.  Lives at home with spouse.  SN to T/I AVR post op care and medication regime.    8/30/22- PT A&OX4, PT REPORTS TOLERTING 3X10 MIN WALKS DAILY W/O ADVERSE EFFECT. CLEAR LUNG SOUNDS, VS WNL, 2+BLE EDEMA,  INCISIONS CDI, PT REPORTS HAS HAS SOME STERNAL PAIN SINCE HOME BUT NO USE OF NITRO SL

## 2022-08-31 ENCOUNTER — LAB (OUTPATIENT)
Dept: LAB | Facility: HOSPITAL | Age: 72
End: 2022-08-31

## 2022-08-31 DIAGNOSIS — Z86.79 HISTORY OF ATRIAL FIBRILLATION: ICD-10-CM

## 2022-08-31 LAB
ANION GAP SERPL CALCULATED.3IONS-SCNC: 7.3 MMOL/L (ref 5–15)
BUN SERPL-MCNC: 13 MG/DL (ref 8–23)
BUN/CREAT SERPL: 17.6 (ref 7–25)
CALCIUM SPEC-SCNC: 8.6 MG/DL (ref 8.6–10.5)
CHLORIDE SERPL-SCNC: 102 MMOL/L (ref 98–107)
CO2 SERPL-SCNC: 26.7 MMOL/L (ref 22–29)
CREAT SERPL-MCNC: 0.74 MG/DL (ref 0.76–1.27)
EGFRCR SERPLBLD CKD-EPI 2021: 96.3 ML/MIN/1.73
GLUCOSE SERPL-MCNC: 94 MG/DL (ref 65–99)
POTASSIUM SERPL-SCNC: 4.5 MMOL/L (ref 3.5–5.2)
SODIUM SERPL-SCNC: 136 MMOL/L (ref 136–145)

## 2022-08-31 PROCEDURE — G0180 MD CERTIFICATION HHA PATIENT: HCPCS | Performed by: THORACIC SURGERY (CARDIOTHORACIC VASCULAR SURGERY)

## 2022-08-31 PROCEDURE — 80048 BASIC METABOLIC PNL TOTAL CA: CPT

## 2022-08-31 PROCEDURE — 36415 COLL VENOUS BLD VENIPUNCTURE: CPT

## 2022-09-01 ENCOUNTER — HOME CARE VISIT (OUTPATIENT)
Dept: HOME HEALTH SERVICES | Facility: HOME HEALTHCARE | Age: 72
End: 2022-09-01

## 2022-09-01 VITALS
OXYGEN SATURATION: 95 % | SYSTOLIC BLOOD PRESSURE: 102 MMHG | RESPIRATION RATE: 18 BRPM | DIASTOLIC BLOOD PRESSURE: 58 MMHG | TEMPERATURE: 98.5 F | HEART RATE: 57 BPM

## 2022-09-01 PROCEDURE — G0299 HHS/HOSPICE OF RN EA 15 MIN: HCPCS

## 2022-09-06 ENCOUNTER — HOME CARE VISIT (OUTPATIENT)
Dept: HOME HEALTH SERVICES | Facility: HOME HEALTHCARE | Age: 72
End: 2022-09-06

## 2022-09-06 VITALS
OXYGEN SATURATION: 96 % | HEART RATE: 60 BPM | RESPIRATION RATE: 20 BRPM | SYSTOLIC BLOOD PRESSURE: 100 MMHG | DIASTOLIC BLOOD PRESSURE: 58 MMHG | TEMPERATURE: 98.3 F

## 2022-09-06 PROCEDURE — G0299 HHS/HOSPICE OF RN EA 15 MIN: HCPCS

## 2022-09-06 NOTE — HOME HEALTH
Patient is s/p AVR, incision to chest has healed. He is doing well. Up without any assistive devices.   He is living with wife. and has no c/o pain, doing well will dc as scheduled.   nursing t/i on sevearl things at visit today

## 2022-09-06 NOTE — HOME HEALTH
post AVR, incision to chest has healed.   doing very well.   we have just about met all goals   will dc him at next visit if no issues.

## 2022-09-08 ENCOUNTER — HOME CARE VISIT (OUTPATIENT)
Dept: HOME HEALTH SERVICES | Facility: HOME HEALTHCARE | Age: 72
End: 2022-09-08

## 2022-09-08 ENCOUNTER — TELEPHONE (OUTPATIENT)
Dept: CARDIOLOGY | Facility: CLINIC | Age: 72
End: 2022-09-08

## 2022-09-08 PROCEDURE — G0299 HHS/HOSPICE OF RN EA 15 MIN: HCPCS

## 2022-09-08 NOTE — TELEPHONE ENCOUNTER
Received call from Geetha ([facility]) to report patient's blood pressure readings:    Date BP HR Notes   1-Sep 102/58 57 from  visit   6-Sep 100/58 60 from  visit    96/50  Patient reading    97/52  Patient reading    96/52  Patient reading     Cardiac Meds  Apixaban 2.5 mg, BID  Digoxin 125 mcg, daily  Atenolol 25 mg, daily  Furosemide 20 mg, daily (this was discontinued by Dr. Pallares)     Per Geetha, patient's weight is stable, he is doing well, and is asymptomatic.      LOV: 8/26 with Jane Cortes  Starts cardiac rehab: 9/23  Next OV: 10/3 with Dr. Quintana    Requested Geetha have patient monitor BP over the next week (twice daily, 1-2 hours after blood pressure meds) and to provide to office for review.  Requested she ask patient to contact office if he develops symptoms or if his readings continue to lower.  Geetha stated she will communicate this to patient.    Please let me know if there is anything else you would like me to do for this patient.    Thank you,  Bekah Blanchard RN  Triage Nurse [facility]

## 2022-09-09 NOTE — HOME HEALTH
DC FROM AGENCY  ALL GOALS MET   D/C INSTRUCTIONS GIVEN   MD NOTIFIED.   HE IS GOING TO CARDIAC REHAB. STARTING ON 9/23    CALLED DR FABIANO VALDES REG LOW BP  PATIENT IS TO TAKE BP 2 X DAY FOR NEXT WEEK AND CALL MD IF ANY SYMPTOMS OCCUR.  HE V/U

## 2022-09-14 ENCOUNTER — OFFICE VISIT (OUTPATIENT)
Dept: CARDIAC SURGERY | Facility: CLINIC | Age: 72
End: 2022-09-14

## 2022-09-14 VITALS
HEIGHT: 67 IN | SYSTOLIC BLOOD PRESSURE: 137 MMHG | OXYGEN SATURATION: 98 % | TEMPERATURE: 97.3 F | BODY MASS INDEX: 24.01 KG/M2 | RESPIRATION RATE: 20 BRPM | WEIGHT: 153 LBS | DIASTOLIC BLOOD PRESSURE: 77 MMHG | HEART RATE: 55 BPM

## 2022-09-14 DIAGNOSIS — Z95.3 S/P AORTIC VALVE REPLACEMENT WITH TISSUE: Primary | ICD-10-CM

## 2022-09-14 PROCEDURE — 99024 POSTOP FOLLOW-UP VISIT: CPT | Performed by: NURSE PRACTITIONER

## 2022-09-14 NOTE — PROGRESS NOTES
"CARDIOVASCULAR SURGERY FOLLOW-UP PROGRESS NOTE  Chief Complaint: Postop follow-up        HPI:   Dear Dr. Pallares, Haily Salazar MD and colleagues:    It was nice to see Hieu Mann in follow up 4 weeks after surgery.  As you know, he is a 71 y.o. male with bicuspid aortic valve stenosis who underwent mini sternotomy with tissue AVR on 8/17/2022 at [facility] with Dr Randle. He had postoperative atrial fibrillation that was successfully suppressed with amiodarone, however it had to be discontinued due to prolonged QTC, patient was placed on Eliquis for anticoagulation at discharge. He comes in today complaining of nothing.  His activity level has been good.  From a surgical standpoint, the sternal incision is well approximated without erythema, edema, or drainage.  The sternum is stable to palpation, and the patient denies any popping or clicking with deep inspiration or coughing.      Physical Exam:         /77 (BP Location: Left arm, Patient Position: Sitting, Cuff Size: Adult)   Pulse 55   Temp 97.3 °F (36.3 °C)   Resp 20   Ht 170.2 cm (67\")   Wt 69.4 kg (153 lb)   SpO2 98%   BMI 23.96 kg/m²   Heart:  regular rate and rhythm, S1, S2 normal, no murmur, click, rub or gallop  Lungs:  clear to auscultation bilaterally  Extremities:  no edema  Incision(s):  mid chest healing well, sternum stable    Assessment/Plan:     S/P AVR. Overall, he is doing well.    No significant post-op complications    No heavy lifting > 10 pounds for 2 more weeks  OK to drive if not taking narcotic pain medicine  Follow-up as scheduled with cardiology  Follow-up with CT surgery prn  Prophylactic antibiotics before dental work and other surgeries lifelong with artificial valve, prosthesis, or grafting    Continue lifting restriction of 10 lbs until 6 weeks and 50 lbs until 12 weeks from the date of surgery, no excessive jarring motions or twisting motions until 12 weeks from the date of surgery    Return to " clinic if any signs or symptoms of infection or sternal instability develop       Thank you for allowing me to participate in the care of your patient.    Regards,  KAITY York  Current outpatient and discharge medications have been reconciled for the patient.  Reviewed by: KAITY York

## 2022-09-23 ENCOUNTER — OFFICE VISIT (OUTPATIENT)
Dept: CARDIAC REHAB | Facility: HOSPITAL | Age: 72
End: 2022-09-23

## 2022-09-23 DIAGNOSIS — Z95.2 S/P AVR (AORTIC VALVE REPLACEMENT): Primary | ICD-10-CM

## 2022-09-23 PROCEDURE — 93798 PHYS/QHP OP CAR RHAB W/ECG: CPT

## 2022-09-26 ENCOUNTER — TREATMENT (OUTPATIENT)
Dept: CARDIAC REHAB | Facility: HOSPITAL | Age: 72
End: 2022-09-26

## 2022-09-26 DIAGNOSIS — Z95.2 S/P AVR (AORTIC VALVE REPLACEMENT): Primary | ICD-10-CM

## 2022-09-26 PROCEDURE — 93798 PHYS/QHP OP CAR RHAB W/ECG: CPT

## 2022-09-28 ENCOUNTER — TREATMENT (OUTPATIENT)
Dept: CARDIAC REHAB | Facility: HOSPITAL | Age: 72
End: 2022-09-28

## 2022-09-28 DIAGNOSIS — Z95.2 S/P AVR (AORTIC VALVE REPLACEMENT): Primary | ICD-10-CM

## 2022-09-28 PROCEDURE — 93798 PHYS/QHP OP CAR RHAB W/ECG: CPT

## 2022-09-30 ENCOUNTER — TREATMENT (OUTPATIENT)
Dept: CARDIAC REHAB | Facility: HOSPITAL | Age: 72
End: 2022-09-30

## 2022-09-30 ENCOUNTER — IMMUNIZATION (OUTPATIENT)
Dept: VACCINE CLINIC | Facility: HOSPITAL | Age: 72
End: 2022-09-30

## 2022-09-30 DIAGNOSIS — Z95.2 S/P AVR (AORTIC VALVE REPLACEMENT): Primary | ICD-10-CM

## 2022-09-30 DIAGNOSIS — Z23 NEED FOR VACCINATION: Primary | ICD-10-CM

## 2022-09-30 PROCEDURE — 93798 PHYS/QHP OP CAR RHAB W/ECG: CPT

## 2022-09-30 PROCEDURE — 91312 HC SARSCOV2 VAC 30MCG/0.3ML IM BIVALENT BOOSTER 12 YRS AND OLDER: CPT | Performed by: INTERNAL MEDICINE

## 2022-09-30 PROCEDURE — 0124A: CPT | Performed by: INTERNAL MEDICINE

## 2022-10-03 ENCOUNTER — TREATMENT (OUTPATIENT)
Dept: CARDIAC REHAB | Facility: HOSPITAL | Age: 72
End: 2022-10-03

## 2022-10-03 ENCOUNTER — OFFICE VISIT (OUTPATIENT)
Dept: CARDIOLOGY | Facility: CLINIC | Age: 72
End: 2022-10-03

## 2022-10-03 VITALS
DIASTOLIC BLOOD PRESSURE: 76 MMHG | WEIGHT: 162.2 LBS | BODY MASS INDEX: 25.46 KG/M2 | HEART RATE: 57 BPM | SYSTOLIC BLOOD PRESSURE: 110 MMHG | HEIGHT: 67 IN

## 2022-10-03 DIAGNOSIS — I35.0 AORTIC STENOSIS, SEVERE: ICD-10-CM

## 2022-10-03 DIAGNOSIS — E78.5 HYPERLIPIDEMIA LDL GOAL <100: ICD-10-CM

## 2022-10-03 DIAGNOSIS — Z95.2 S/P AVR (AORTIC VALVE REPLACEMENT): Primary | ICD-10-CM

## 2022-10-03 DIAGNOSIS — I35.0 NONRHEUMATIC AORTIC VALVE STENOSIS: Primary | ICD-10-CM

## 2022-10-03 PROCEDURE — 93000 ELECTROCARDIOGRAM COMPLETE: CPT | Performed by: INTERNAL MEDICINE

## 2022-10-03 PROCEDURE — 99214 OFFICE O/P EST MOD 30 MIN: CPT | Performed by: INTERNAL MEDICINE

## 2022-10-03 PROCEDURE — 93798 PHYS/QHP OP CAR RHAB W/ECG: CPT

## 2022-10-03 NOTE — PROGRESS NOTES
Date of Office Visit:  10/03/22    Encounter Provider: Master Quintana MD  Place of Service: [facility] CARDIOLOGY  Patient Name: Hieu Mann Sr.  :1950    Chief Complaint   Patient presents with   • Follow-up   Surgical AVR    HPI:    71-year-old male with a medical history of severe aortic valve stenosis, bicuspid aortic valve, aortic valve replacement with a 25 mm magna pericardial valve, postoperative atrial fibrillation, who presents back in follow-up.  He is doing very well.  He denies any chest pain or ROSS.  He is tolerating cardiac rehab without any limitation.  Blood pressure and heart rate are well controlled.  He is maintaining sinus rhythm.      Past Medical History:   Diagnosis Date   • Aortic valve stenosis    • Arthritis    • Elevated cholesterol    • Epigastric hernia    • Heart murmur     NO TREATMENT   • History of atrial fibrillation    • History of irregular heartbeat     A-FIB   • History of [place] spotted fever    • Hyperlipidemia    • Swelling     RIGHT HIP AREA       Past Surgical History:   Procedure Laterality Date   • AORTIC VALVE REPAIR/REPLACEMENT N/A 2022    Procedure: MINI STERNOTOMY; AORTIC VALVE REPLACEMENT; TRANSESOPHAGEAL ECHOCARDIOGRAM; PRP;  Surgeon: Corwin Randle MD;  Location: [facility];  Service: Cardiothoracic;  Laterality: N/A;   • CARDIAC CATHETERIZATION N/A 2022    Procedure: Coronary angiography;  Surgeon: Master Quintana MD;  Location: [facility] CATH INVASIVE LOCATION;  Service: Cardiology;  Laterality: N/A;   • CATARACT EXTRACTION WITH INTRAOCULAR LENS IMPLANT Bilateral    • COLONOSCOPY N/A     DR. HONEYCUTT   • EPIGASTRIC HERNIA REPAIR N/A 3/9/2018    Procedure: EPIGASTRIC HERNIA REPAIR;  Surgeon: David Finn MD;  Location: [facility] OR [facility];  Service: General   • LAPAROSCOPIC INGUINAL HERNIA REPAIR Left 2015    Dr. Hieu Khan   • REPLACEMENT TOTAL KNEE Left 2012      David Stein   • REPLACEMENT TOTAL KNEE Right 11/12/2003    Right total knee replacement, cementless [place] [employer]-Dr. David Stein   • TOTAL HIP ARTHROPLASTY Right 05/11/2009    Dr. David Stein   • TOTAL HIP ARTHROPLASTY Left 02/24/2011    Dr. David Stein   • TOTAL HIP ARTHROPLASTY Right 12/15/2020    Procedure: RT. TOTAL HIP ARTHROPLASTY;  Surgeon: David Stein II, MD;  Location: [facility];  Service: Orthopedics;  Laterality: Right;       Social History     Socioeconomic History   • Marital status:    Tobacco Use   • Smoking status: Never Smoker   • Smokeless tobacco: Never Used   • Tobacco comment: CAFFEINE USE: 1 CUP COFFEE DAILY   Vaping Use   • Vaping Use: Never used   Substance and Sexual Activity   • Alcohol use: Yes     Comment: ONE BEER ONCE MONTH   • Drug use: No   • Sexual activity: Defer       Family History   Problem Relation Age of Onset   • Lung cancer Father    • Malig Hyperthermia Neg Hx        Review of Systems   Constitutional: Negative for diaphoresis, fever and malaise/fatigue.   HENT: Negative for nosebleeds and sore throat.    Eyes: Negative for blurred vision and double vision.   Cardiovascular: Negative for chest pain, claudication, dyspnea on exertion, irregular heartbeat, leg swelling, near-syncope, orthopnea, palpitations, paroxysmal nocturnal dyspnea and syncope.   Respiratory: Negative for cough, shortness of breath, sleep disturbances due to breathing and snoring.    Endocrine: Negative for cold intolerance, heat intolerance and polydipsia.   Skin: Negative for itching, poor wound healing and rash.   Musculoskeletal: Negative for falls, joint pain, joint swelling, muscle weakness and myalgias.   Gastrointestinal: Negative for abdominal pain, melena, nausea and vomiting.   Neurological: Negative for dizziness, light-headedness, loss of balance, seizures, vertigo and weakness.   Psychiatric/Behavioral: Negative for altered mental status, depression and  "substance abuse.       Allergies   Allergen Reactions   • Simvastatin Myalgia     Other reaction(s): Muscle pain         Current Outpatient Medications:   •  aspirin 81 MG EC tablet, Take 1 tablet by mouth Daily., Disp: 30 tablet, Rfl: 2  •  atenolol (TENORMIN) 25 MG tablet, Take 1 tablet by mouth Daily., Disp: 30 tablet, Rfl: 2  •  atorvastatin (LIPITOR) 20 MG tablet, Take 20 mg by mouth Every Night., Disp: , Rfl:   •  Cholecalciferol (VITAMIN D3 PO), Take 1 tablet by mouth Daily. HOLDING FOR SURGERY, Disp: , Rfl:   •  digoxin (LANOXIN) 125 MCG tablet, Take 1 tablet by mouth Daily., Disp: 30 tablet, Rfl: 2  •  Multiple Vitamins-Minerals (CENTRUM SILVER ADULT 50+ PO), Take 1 tablet by mouth Daily. HOLDING FOR SURGERY, Disp: , Rfl:   •  sildenafil (VIAGRA) 25 MG tablet, Take 25 mg by mouth Daily As Needed for Erectile Dysfunction., Disp: , Rfl:       Objective:     Vitals:    10/03/22 0951   BP: 110/76   BP Location: Left arm   Patient Position: Sitting   Pulse: 57   Weight: 73.6 kg (162 lb 3.2 oz)   Height: 170.2 cm (67\")     Body mass index is 25.4 kg/m².    PHYSICAL EXAM:  Constitutional:       Appearance: Well-developed.   Eyes:      General: No scleral icterus.     Conjunctiva/sclera: Conjunctivae normal.   HENT:      Head: Normocephalic and atraumatic.   Neck:      Thyroid: No thyromegaly.      Vascular: Normal carotid pulses. No carotid bruit, hepatojugular reflux or JVD.      Trachea: No tracheal deviation.   Pulmonary:      Effort: No respiratory distress.      Breath sounds: Normal breath sounds. No decreased breath sounds. No wheezing. No rhonchi. No rales.   Chest:      Chest wall: Not tender to palpatation.   Cardiovascular:      Normal rate. Regular rhythm.      No gallop.   Pulses:     Carotid: 2+ bilaterally.     Radial: 2+ bilaterally.     Femoral: 2+ bilaterally.     Dorsalis pedis: 2+ bilaterally.     Posterior tibial: 2+ bilaterally.  Abdominal:      General: Bowel sounds are normal. There is no " distension.      Palpations: Abdomen is soft.      Tenderness: There is no abdominal tenderness.   Musculoskeletal:         General: No deformity.      Cervical back: Normal range of motion and neck supple. Skin:     Findings: No erythema or rash.      Comments: Sternotomy     Neurological:      Mental Status: Alert and oriented to person, place, and time.      Sensory: No sensory deficit.   Psychiatric:         Behavior: Behavior normal.           ECG 12 Lead    Date/Time: 10/3/2022 10:04 AM  Performed by: Master Quintana MD  Authorized by: Master Quintana MD   Comparison: compared with previous ECG from 8/26/2022  Similar to previous ECG  Rhythm: sinus rhythm  Rate: bradycardic  QRS axis: normal               8/17/2022  Minimally invasive upper J sternotomy AVR with a 25 mm magna pericardial prosthesis    7/26/2022  Conclusions:   1. Left main: Normal  2. LAD: Diffuse 20% proximal to mid-vessel stenosis  3. LCX: Normal  4. RCA: Discrete 20% proximal stenosis.        Assessment:      No diagnosis found.  No orders of the defined types were placed in this encounter.    Plan:   71 year old male with a medical history of bicuspid aortic valve, surgical aortic valve replacement via mini sternotomy, paroxysmal atrial fibrillation that was predominantly postoperative, who presents back in follow-up.  He is doing very well.  He denies any chest pain or ROSS.  Maintaining sinus rhythm.    1.  History of bicuspid aortic valve and surgical aortic valve replacement as documented above  - Continue aspirin 81 mg p.o. daily lifelong  - Antibiotic prophylaxis for dental work lifelong    2.  Hyperlipidemia: Continue atorvastatin at the increased dose of 40 mg p.o. nightly.  Patient is tolerating this well.  No myalgias.  Labs reviewed and LDL on last check was around 100 which was prior to the increase in therapy.    3.  Postoperative atrial fibrillation: Digoxin stopped.  Eliquis stopped.  Containing sinus          Your medication list          Accurate as of October 3, 2022 10:01 AM. If you have any questions, ask your nurse or doctor.            CONTINUE taking these medications      Instructions Last Dose Given Next Dose Due   Aspirin Adult Low Strength 81 MG EC tablet  Generic drug: aspirin      Take 1 tablet by mouth Daily.       atenolol 25 MG tablet  Commonly known as: TENORMIN      Take 1 tablet by mouth Daily.       atorvastatin 20 MG tablet  Commonly known as: LIPITOR      Take 20 mg by mouth Every Night.       digoxin 125 MCG tablet  Commonly known as: LANOXIN      Take 1 tablet by mouth Daily.       multivitamin with minerals tablet tablet      Take 1 tablet by mouth Daily. HOLDING FOR SURGERY       sildenafil 25 MG tablet  Commonly known as: VIAGRA      Take 25 mg by mouth Daily As Needed for Erectile Dysfunction.       VITAMIN D3 PO      Take 1 tablet by mouth Daily. HOLDING FOR SURGERY

## 2022-10-05 ENCOUNTER — TREATMENT (OUTPATIENT)
Dept: CARDIAC REHAB | Facility: HOSPITAL | Age: 72
End: 2022-10-05

## 2022-10-05 DIAGNOSIS — Z95.2 S/P AVR (AORTIC VALVE REPLACEMENT): Primary | ICD-10-CM

## 2022-10-05 PROCEDURE — 93798 PHYS/QHP OP CAR RHAB W/ECG: CPT

## 2022-10-07 ENCOUNTER — TREATMENT (OUTPATIENT)
Dept: CARDIAC REHAB | Facility: HOSPITAL | Age: 72
End: 2022-10-07

## 2022-10-07 DIAGNOSIS — Z95.2 S/P AVR (AORTIC VALVE REPLACEMENT): Primary | ICD-10-CM

## 2022-10-07 PROCEDURE — 93798 PHYS/QHP OP CAR RHAB W/ECG: CPT

## 2022-10-10 ENCOUNTER — TREATMENT (OUTPATIENT)
Dept: CARDIAC REHAB | Facility: HOSPITAL | Age: 72
End: 2022-10-10

## 2022-10-10 DIAGNOSIS — Z95.2 S/P AVR (AORTIC VALVE REPLACEMENT): Primary | ICD-10-CM

## 2022-10-10 PROCEDURE — 93798 PHYS/QHP OP CAR RHAB W/ECG: CPT

## 2022-10-12 ENCOUNTER — TREATMENT (OUTPATIENT)
Dept: CARDIAC REHAB | Facility: HOSPITAL | Age: 72
End: 2022-10-12

## 2022-10-12 DIAGNOSIS — Z95.2 S/P AVR (AORTIC VALVE REPLACEMENT): Primary | ICD-10-CM

## 2022-10-12 PROCEDURE — 93798 PHYS/QHP OP CAR RHAB W/ECG: CPT

## 2022-10-14 ENCOUNTER — TREATMENT (OUTPATIENT)
Dept: CARDIAC REHAB | Facility: HOSPITAL | Age: 72
End: 2022-10-14

## 2022-10-14 DIAGNOSIS — Z95.2 S/P AVR (AORTIC VALVE REPLACEMENT): Primary | ICD-10-CM

## 2022-10-14 PROCEDURE — 93798 PHYS/QHP OP CAR RHAB W/ECG: CPT

## 2022-10-17 ENCOUNTER — TREATMENT (OUTPATIENT)
Dept: CARDIAC REHAB | Facility: HOSPITAL | Age: 72
End: 2022-10-17

## 2022-10-17 DIAGNOSIS — Z95.2 S/P AVR (AORTIC VALVE REPLACEMENT): Primary | ICD-10-CM

## 2022-10-17 PROCEDURE — 93798 PHYS/QHP OP CAR RHAB W/ECG: CPT

## 2022-10-19 ENCOUNTER — TREATMENT (OUTPATIENT)
Dept: CARDIAC REHAB | Facility: HOSPITAL | Age: 72
End: 2022-10-19

## 2022-10-19 DIAGNOSIS — Z95.2 S/P AVR (AORTIC VALVE REPLACEMENT): Primary | ICD-10-CM

## 2022-10-19 PROCEDURE — 93798 PHYS/QHP OP CAR RHAB W/ECG: CPT

## 2022-10-21 ENCOUNTER — APPOINTMENT (OUTPATIENT)
Dept: CARDIAC REHAB | Facility: HOSPITAL | Age: 72
End: 2022-10-21

## 2022-10-24 ENCOUNTER — APPOINTMENT (OUTPATIENT)
Dept: CARDIAC REHAB | Facility: HOSPITAL | Age: 72
End: 2022-10-24

## 2022-10-26 ENCOUNTER — APPOINTMENT (OUTPATIENT)
Dept: CARDIAC REHAB | Facility: HOSPITAL | Age: 72
End: 2022-10-26

## 2022-10-28 ENCOUNTER — APPOINTMENT (OUTPATIENT)
Dept: CARDIAC REHAB | Facility: HOSPITAL | Age: 72
End: 2022-10-28

## 2022-10-31 ENCOUNTER — APPOINTMENT (OUTPATIENT)
Dept: CARDIAC REHAB | Facility: HOSPITAL | Age: 72
End: 2022-10-31

## 2022-11-02 ENCOUNTER — APPOINTMENT (OUTPATIENT)
Dept: CARDIAC REHAB | Facility: HOSPITAL | Age: 72
End: 2022-11-02

## 2022-11-04 ENCOUNTER — APPOINTMENT (OUTPATIENT)
Dept: CARDIAC REHAB | Facility: HOSPITAL | Age: 72
End: 2022-11-04

## 2022-11-07 ENCOUNTER — APPOINTMENT (OUTPATIENT)
Dept: CARDIAC REHAB | Facility: HOSPITAL | Age: 72
End: 2022-11-07

## 2022-11-09 ENCOUNTER — APPOINTMENT (OUTPATIENT)
Dept: CARDIAC REHAB | Facility: HOSPITAL | Age: 72
End: 2022-11-09

## 2022-11-11 ENCOUNTER — APPOINTMENT (OUTPATIENT)
Dept: CARDIAC REHAB | Facility: HOSPITAL | Age: 72
End: 2022-11-11

## 2022-11-14 ENCOUNTER — APPOINTMENT (OUTPATIENT)
Dept: CARDIAC REHAB | Facility: HOSPITAL | Age: 72
End: 2022-11-14

## 2022-11-14 RX ORDER — ATENOLOL 25 MG/1
25 TABLET ORAL DAILY
Qty: 90 TABLET | Refills: 2 | Status: SHIPPED | OUTPATIENT
Start: 2022-11-14

## 2022-11-16 ENCOUNTER — APPOINTMENT (OUTPATIENT)
Dept: CARDIAC REHAB | Facility: HOSPITAL | Age: 72
End: 2022-11-16

## 2022-11-18 ENCOUNTER — APPOINTMENT (OUTPATIENT)
Dept: CARDIAC REHAB | Facility: HOSPITAL | Age: 72
End: 2022-11-18

## 2023-04-04 ENCOUNTER — OFFICE VISIT (OUTPATIENT)
Dept: CARDIOLOGY | Facility: CLINIC | Age: 73
End: 2023-04-04
Payer: MEDICARE

## 2023-04-04 VITALS
SYSTOLIC BLOOD PRESSURE: 132 MMHG | HEIGHT: 67 IN | WEIGHT: 172 LBS | BODY MASS INDEX: 27 KG/M2 | DIASTOLIC BLOOD PRESSURE: 80 MMHG

## 2023-04-04 DIAGNOSIS — Z95.3 S/P AORTIC VALVE REPLACEMENT WITH TISSUE: ICD-10-CM

## 2023-04-04 DIAGNOSIS — E78.5 HYPERLIPIDEMIA LDL GOAL <100: ICD-10-CM

## 2023-04-04 DIAGNOSIS — Z86.79 HISTORY OF ATRIAL FIBRILLATION: Primary | ICD-10-CM

## 2023-04-04 DIAGNOSIS — I35.8 AORTIC VALVE SCLEROSIS: ICD-10-CM

## 2023-04-04 DIAGNOSIS — I35.0 NONRHEUMATIC AORTIC VALVE STENOSIS: ICD-10-CM

## 2023-04-04 PROCEDURE — 93000 ELECTROCARDIOGRAM COMPLETE: CPT | Performed by: NURSE PRACTITIONER

## 2023-04-04 PROCEDURE — 99214 OFFICE O/P EST MOD 30 MIN: CPT | Performed by: NURSE PRACTITIONER

## 2023-04-04 PROCEDURE — 1160F RVW MEDS BY RX/DR IN RCRD: CPT | Performed by: NURSE PRACTITIONER

## 2023-04-04 PROCEDURE — 1159F MED LIST DOCD IN RCRD: CPT | Performed by: NURSE PRACTITIONER

## 2023-04-04 RX ORDER — AMOXICILLIN 500 MG/1
TABLET, FILM COATED ORAL
COMMUNITY
Start: 2022-12-10

## 2023-04-04 NOTE — PROGRESS NOTES
Subjective:     Encounter Date:04/04/2023      Patient ID: Hieu Mann Sr. is a 72 y.o. male.    Chief Complaint:follow up AVR, PAF  History of Present Illness  This is a 73 y/o man who follows with Dr. Quintana whom I saw during his hospitalization last year. He has a pmhx of paroxysmal atrial fibrillation, nonrheumatic aortic stenosis, mixed hyperlipidemia and mild coronary artery disease. He underwent aortic valve replacement with a 25 mm magna pericardial valve on 8/17/22 with Dr. Randle. He did develop post op atrial fibrillation and was put on IV amiodarone. Amiodarone had to be discontinued due to prolonged QT interval and he was then put on digoxin for rate control. He was started on apixaban for anticoagulation. At follow up with Dr. Quintana, he had been maintaining sinus rhythm. Both digoxin and apixaban were stopped.    He is here today for a follow up visit. He continues to remain in sinus rhythm. He reports feeling well with no complaints of chest pain, shortness of breath, palpitations, dizziness or syncope. He denies any further swelling in his lower extremities, orthopnea or PND. He still works part-time at [employer] and is on his feet a lot. He walks a lot and goes to [employer]. He denies any cardiac symptoms with exercise.    I have reviewed and updated as appropriate allergies, current medications, past family history, past medical history, past surgical history and problem list.    Review of Systems   Constitutional: Negative for fever, malaise/fatigue, weight gain and weight loss.   HENT: Negative for congestion, hoarse voice and sore throat.    Eyes: Negative for blurred vision and double vision.   Cardiovascular: Negative for chest pain, dyspnea on exertion, leg swelling, orthopnea, palpitations and syncope.   Respiratory: Negative for cough, shortness of breath and wheezing.    Gastrointestinal: Negative for abdominal pain, hematemesis, hematochezia and melena.    Genitourinary: Negative for dysuria and hematuria.   Neurological: Negative for dizziness, headaches, light-headedness and numbness.   Psychiatric/Behavioral: Negative for depression. The patient is not nervous/anxious.          Current Outpatient Medications:   •  amoxicillin (AMOXIL) 500 MG tablet, TAKE 4 TABLETS BY MOUTH 1 HOUR PRIOR TO DENTAL PROCEDURE, Disp: , Rfl:   •  aspirin 81 MG EC tablet, Take 1 tablet by mouth Daily., Disp: 30 tablet, Rfl: 2  •  atenolol (TENORMIN) 25 MG tablet, Take 1 tablet by mouth Daily., Disp: 90 tablet, Rfl: 2  •  atorvastatin (LIPITOR) 20 MG tablet, Take 1 tablet by mouth Every Night., Disp: , Rfl:   •  Cholecalciferol (VITAMIN D3 PO), Take 1 tablet by mouth Daily. HOLDING FOR SURGERY, Disp: , Rfl:   •  Multiple Vitamins-Minerals (CENTRUM SILVER ADULT 50+ PO), Take 1 tablet by mouth Daily. HOLDING FOR SURGERY, Disp: , Rfl:   •  sildenafil (VIAGRA) 25 MG tablet, Take 1 tablet by mouth Daily As Needed for Erectile Dysfunction., Disp: , Rfl:   •  digoxin (LANOXIN) 125 MCG tablet, Take 1 tablet by mouth Daily., Disp: 30 tablet, Rfl: 2    Past Medical History:   Diagnosis Date   • Aortic valve stenosis    • Arthritis    • Elevated cholesterol    • Epigastric hernia    • Heart murmur     NO TREATMENT   • History of atrial fibrillation    • History of irregular heartbeat     A-FIB   • History of [place] spotted fever 2015   • Hyperlipidemia    • Swelling     RIGHT HIP AREA       Past Surgical History:   Procedure Laterality Date   • AORTIC VALVE REPAIR/REPLACEMENT N/A 8/17/2022    Procedure: MINI STERNOTOMY; AORTIC VALVE REPLACEMENT; TRANSESOPHAGEAL ECHOCARDIOGRAM; PRP;  Surgeon: Corwin Randle MD;  Location: [facility] CVOR;  Service: Cardiothoracic;  Laterality: N/A;   • CARDIAC CATHETERIZATION N/A 7/26/2022    Procedure: Coronary angiography;  Surgeon: Master Quintana MD;  Location: [facility] CATH INVASIVE LOCATION;  Service: Cardiology;  Laterality: N/A;   • CATARACT  "EXTRACTION WITH INTRAOCULAR LENS IMPLANT Bilateral    • COLONOSCOPY N/A 2014    DR. HONEYCUTT   • EPIGASTRIC HERNIA REPAIR N/A 3/9/2018    Procedure: EPIGASTRIC HERNIA REPAIR;  Surgeon: David Finn MD;  Location: [facility];  Service: General   • LAPAROSCOPIC INGUINAL HERNIA REPAIR Left 03/27/2015    Dr. Hieu Khan   • REPLACEMENT TOTAL KNEE Left 08/06/2012    Dr. David Stein   • REPLACEMENT TOTAL KNEE Right 11/12/2003    Right total knee replacement, cementless [place] [employer]fix-Dr. David Stein   • TOTAL HIP ARTHROPLASTY Right 05/11/2009    Dr. David Stein   • TOTAL HIP ARTHROPLASTY Left 02/24/2011    Dr. David Stein   • TOTAL HIP ARTHROPLASTY Right 12/15/2020    Procedure: RT. TOTAL HIP ARTHROPLASTY;  Surgeon: David Stein II, MD;  Location: [facility] OR;  Service: Orthopedics;  Laterality: Right;       Family History   Problem Relation Age of Onset   • Lung cancer Father    • Malig Hyperthermia Neg Hx        Social History     Tobacco Use   • Smoking status: Never   • Smokeless tobacco: Never   • Tobacco comments:     CAFFEINE USE: 1 CUP COFFEE DAILY   Vaping Use   • Vaping Use: Never used   Substance Use Topics   • Alcohol use: Yes     Comment: ONE BEER ONCE MONTH   • Drug use: No         ECG 12 Lead    Date/Time: 4/4/2023 11:27 AM  Performed by: Sowmya Reaves APRN  Authorized by: Sowmya Reavse APRN   Comparison: compared with previous ECG from 10/3/2022  Rhythm: sinus rhythm  Comments: No significant change from previous EKG               Objective:     Visit Vitals  /80   Ht 170.2 cm (67\")   Wt 78 kg (172 lb)   BMI 26.94 kg/m²             Physical Exam  Constitutional:       Appearance: Normal appearance. He is normal weight.   HENT:      Head: Normocephalic.   Neck:      Vascular: No carotid bruit.   Cardiovascular:      Rate and Rhythm: Normal rate and regular rhythm.      Chest Wall: PMI is not displaced.      Pulses: Normal pulses.           Radial pulses are 2+ on the " right side and 2+ on the left side.        Posterior tibial pulses are 2+ on the right side and 2+ on the left side.      Heart sounds: Normal heart sounds. No murmur heard.    No friction rub. No gallop.   Pulmonary:      Effort: Pulmonary effort is normal.      Breath sounds: Normal breath sounds.   Abdominal:      General: Bowel sounds are normal. There is no distension.      Palpations: Abdomen is soft.   Musculoskeletal:      Right lower leg: No edema.      Left lower leg: No edema.   Skin:     General: Skin is warm and dry.      Capillary Refill: Capillary refill takes less than 2 seconds.   Neurological:      Mental Status: He is alert and oriented to person, place, and time.   Psychiatric:         Mood and Affect: Mood normal.         Behavior: Behavior normal.         Thought Content: Thought content normal.          Lab Review:   Lipid Panel    Lipid Panel 8/15/22   Total Cholesterol 185   Triglycerides 74   HDL Cholesterol 71 (A)   VLDL Cholesterol 14   LDL Cholesterol  100   LDL/HDL Ratio 1.40   (A) Abnormal value                Cardiac Procedures:   1. Cardiac catheterization 7/26/22:  Procedure findings:  Left main: Large-caliber vessel that complex.  Normal  LAD: Medium caliber vessel with moderate calcification.  Diffuse 20% proximal to mid vessel stenosis.  LCX: Medium caliber vessel that gives rise to a medium caliber first and second obtuse branch.  Normal.  RCA: Medium caliber, dominant vessel gives rise to medium caliber PDA and small caliber RPL.  20% proximal stenosis.     Hemodynamics:   AO: 80/45/58     Estimated blood loss: Minimal     Complications: None     Conclusions:   1. Left main: Normal  2. LAD: Diffuse 20% proximal to mid-vessel stenosis  3. LCX: Normal  4. RCA: Discrete 20% proximal stenosis.  2. Echocardiogram 7/21/22:  • Calculated left ventricular EF = 64.3% Estimated left ventricular EF was in agreement with the calculated left ventricular EF. Left ventricular systolic  function is normal.  • Left ventricular diastolic function is consistent with (grade I) impaired relaxation.  • Estimated right ventricular systolic pressure from tricuspid regurgitation is mildly elevated (35-45 mmHg). Calculated right ventricular systolic pressure from tricuspid regurgitation is 40 mmHg.  • Mild pulmonary hypertension is present.  • The aortic valve is abnormal in structure. A bicuspid aortic valve is suggested. There is calcification of the aortic valve. Trace aortic valve regurgitation is present. Severe aortic valve stenosis is present. Aortic valve area is 0.46 cm2. Peak velocity of the flow distal to the aortic valve is 492.5 cm/s. Aortic valve maximum pressure gradient is 97.0 mmHg. Aortic valve mean pressure gradient is 49.3 mmHg. Aortic valve dimensionless index is 0.20 .     Assessment:         Diagnoses and all orders for this visit:    1. History of atrial fibrillation (Primary)    2. Aortic valve sclerosis    3. Nonrheumatic aortic valve stenosis    4. S/P aortic valve replacement with tissue    5. Hyperlipidemia LDL goal <100    Other orders  -     ECG 12 Lead            Plan:       1. Severe aortic stenosis due to bicuspid aortic valve: s/p AVR with Dr. Randle in August 2022. Continues to do well after surgery with no complaints. Continue aspirin 81 mg daily. Will need antibiotic prophylaxis for dental work lifelong.  2. Paroxysmal atrial fibrillation: post operatively. Maintaining sinus rhythm since. No longer requiring anticoagulation or digoxin.   3. Non-obstructive CAD: on aspirin, BB and statin therapy  4. HLD: on statin therapy    Thank you for allowing me to participate in this patient's care. Please call with any questions or concerns. Mr. Mann will follow up with Dr. Quintana in 6 months.          Your medication list          Accurate as of April 4, 2023 11:34 AM. If you have any questions, ask your nurse or doctor.            CONTINUE taking these medications       Instructions Last Dose Given Next Dose Due   amoxicillin 500 MG tablet  Commonly known as: AMOXIL      TAKE 4 TABLETS BY MOUTH 1 HOUR PRIOR TO DENTAL PROCEDURE       Aspirin Adult Low Strength 81 MG EC tablet  Generic drug: aspirin      Take 1 tablet by mouth Daily.       atenolol 25 MG tablet  Commonly known as: TENORMIN      Take 1 tablet by mouth Daily.       atorvastatin 20 MG tablet  Commonly known as: LIPITOR      Take 1 tablet by mouth Every Night.       digoxin 125 MCG tablet  Commonly known as: LANOXIN      Take 1 tablet by mouth Daily.       multivitamin with minerals tablet tablet      Take 1 tablet by mouth Daily. HOLDING FOR SURGERY       sildenafil 25 MG tablet  Commonly known as: VIAGRA      Take 1 tablet by mouth Daily As Needed for Erectile Dysfunction.       VITAMIN D3 PO      Take 1 tablet by mouth Daily. HOLDING FOR SURGERY                KAITY Crooks  04/04/23  10:36 AM EDT

## 2023-04-07 ENCOUNTER — TELEPHONE (OUTPATIENT)
Dept: CARDIOLOGY | Facility: CLINIC | Age: 73
End: 2023-04-07
Payer: MEDICARE

## 2023-04-07 NOTE — TELEPHONE ENCOUNTER
----- Message from Alfredo Feliz sent at 4/4/2023 10:42 AM EDT -----  Patient needs 6 month with Mariano. Would like a morning appointment if possible

## 2023-05-15 RX ORDER — ATENOLOL 25 MG/1
25 TABLET ORAL DAILY
Qty: 90 TABLET | Refills: 2 | Status: SHIPPED | OUTPATIENT
Start: 2023-05-15

## 2023-08-07 ENCOUNTER — TELEPHONE (OUTPATIENT)
Dept: CARDIOLOGY | Facility: CLINIC | Age: 73
End: 2023-08-07
Payer: MEDICARE

## 2023-08-07 RX ORDER — ATENOLOL 25 MG/1
25 TABLET ORAL DAILY
Qty: 90 TABLET | Refills: 2 | Status: SHIPPED | OUTPATIENT
Start: 2023-08-07

## 2023-08-07 NOTE — TELEPHONE ENCOUNTER
"    Caller: Hieu Mann Sr. \"PATEL\"    Relationship: Self    Best call back number: [phone]    Requested Prescriptions:   Requested Prescriptions     Pending Prescriptions Disp Refills    atenolol (TENORMIN) 25 MG tablet 90 tablet 2     Sig: Take 1 tablet by mouth Daily.        Pharmacy where request should be sent: [id number] [address] [place] [address] AT SEC OF [facility] & [facility] [phone] [facility] [phone] FX     Last office visit with prescribing clinician: 10/3/2022   Last telemedicine visit with prescribing clinician: Visit date not found   Next office visit with prescribing clinician: 10/30/2023     Additional details provided by patient:    Does the patient have less than a 3 day supply:  [] Yes  [x] No    Would you like a call back once the refill request has been completed: [] Yes [x] No    If the office needs to give you a call back, can they leave a voicemail: [] Yes [x] No    Alfredo Sexton Rep   08/07/23 08:45 EDT         "

## 2023-10-30 ENCOUNTER — OFFICE VISIT (OUTPATIENT)
Age: 73
End: 2023-10-30
Payer: MEDICARE

## 2023-10-30 VITALS
HEART RATE: 44 BPM | DIASTOLIC BLOOD PRESSURE: 70 MMHG | SYSTOLIC BLOOD PRESSURE: 126 MMHG | WEIGHT: 176 LBS | BODY MASS INDEX: 27.62 KG/M2 | HEIGHT: 67 IN

## 2023-10-30 DIAGNOSIS — Z95.3 S/P AORTIC VALVE REPLACEMENT WITH TISSUE: ICD-10-CM

## 2023-10-30 DIAGNOSIS — Z86.79 HISTORY OF ATRIAL FIBRILLATION: Primary | ICD-10-CM

## 2023-10-30 DIAGNOSIS — E78.5 HYPERLIPIDEMIA LDL GOAL <100: ICD-10-CM

## 2023-10-30 PROCEDURE — 99214 OFFICE O/P EST MOD 30 MIN: CPT | Performed by: INTERNAL MEDICINE

## 2023-10-30 PROCEDURE — 93000 ELECTROCARDIOGRAM COMPLETE: CPT | Performed by: INTERNAL MEDICINE

## 2023-10-30 NOTE — PROGRESS NOTES
Date of Office Visit:  10/30/23    Encounter Provider: Master Quintana MD  Place of Service: [facility] CARDIOLOGY  Patient Name: Hieu Mann Sr.  :1950    Chief Complaint   Patient presents with    Aortic Stenosis    Follow-up     6 month     HPI:   72-year-old male with a medical history of severe aortic valve stenosis, bicuspid aortic valve, aortic valve replacement with a 25 mm magna pericardial valve, postoperative atrial fibrillation, who presents back in follow-up.  Since her last visit he has been doing very well.  He denies any chest pain or dyspnea.  He does have mild sinus bradycardia but has had no lightheadedness or presyncope.  Continues on low-dose atenolol therapy.  No longer on digoxin.    Past Medical History:   Diagnosis Date    Aortic valve stenosis     Arthritis     Elevated cholesterol     Epigastric hernia     Heart murmur     NO TREATMENT    History of atrial fibrillation     History of irregular heartbeat     A-FIB    History of [place] spotted fever     Hyperlipidemia     Swelling     RIGHT HIP AREA       Past Surgical History:   Procedure Laterality Date    AORTIC VALVE REPAIR/REPLACEMENT N/A 2022    Procedure: MINI STERNOTOMY; AORTIC VALVE REPLACEMENT; TRANSESOPHAGEAL ECHOCARDIOGRAM; PRP;  Surgeon: Corwin Randle MD;  Location: [facility] CVOR;  Service: Cardiothoracic;  Laterality: N/A;    CARDIAC CATHETERIZATION N/A 2022    Procedure: Coronary angiography;  Surgeon: Master Quintana MD;  Location: [facility] CATH INVASIVE LOCATION;  Service: Cardiology;  Laterality: N/A;    CATARACT EXTRACTION WITH INTRAOCULAR LENS IMPLANT Bilateral     COLONOSCOPY N/A     DR. HONEYCUTT    EPIGASTRIC HERNIA REPAIR N/A 3/9/2018    Procedure: EPIGASTRIC HERNIA REPAIR;  Surgeon: David Finn MD;  [place] [facility] [place] [facility] [place]  Service: General    LAPAROSCOPIC INGUINAL HERNIA REPAIR Left 2015    Dr. Hieu Khan    REPLACEMENT  TOTAL KNEE Left 08/06/2012    Dr. David Stein    REPLACEMENT TOTAL KNEE Right 11/12/2003    Right total knee replacement, cementless Hannah Profix-Dr. David Stein    TOTAL HIP ARTHROPLASTY Right 05/11/2009    Dr. David Stein    TOTAL HIP ARTHROPLASTY Left 02/24/2011    Dr. David Stein    TOTAL HIP ARTHROPLASTY Right 12/15/2020    Procedure: RT. TOTAL HIP ARTHROPLASTY;  Surgeon: David Stein II, MD;  Location: [facility];  Service: Orthopedics;  Laterality: Right;       Social History     Socioeconomic History    Marital status:    Tobacco Use    Smoking status: Never    Smokeless tobacco: Never    Tobacco comments:     CAFFEINE USE: 1 CUP COFFEE DAILY   Vaping Use    Vaping Use: Never used   Substance and Sexual Activity    Alcohol use: Yes     Comment: ONE BEER ONCE MONTH    Drug use: No    Sexual activity: Defer       Family History   Problem Relation Age of Onset    Lung cancer Father     Malig Hyperthermia Neg Hx        Review of Systems   Constitutional: Negative for diaphoresis, fever and malaise/fatigue.   HENT:  Negative for nosebleeds and sore throat.    Eyes:  Negative for blurred vision and double vision.   Cardiovascular:  Negative for chest pain, claudication, dyspnea on exertion, irregular heartbeat, leg swelling, near-syncope, orthopnea, palpitations, paroxysmal nocturnal dyspnea and syncope.   Respiratory:  Negative for cough, shortness of breath, sleep disturbances due to breathing and snoring.    Endocrine: Negative for cold intolerance, heat intolerance and polydipsia.   Skin:  Negative for itching, poor wound healing and rash.   Musculoskeletal:  Negative for falls, joint pain, joint swelling, muscle weakness and myalgias.   Gastrointestinal:  Negative for abdominal pain, melena, nausea and vomiting.   Neurological:  Negative for dizziness, light-headedness, loss of balance, seizures, vertigo and weakness.   Psychiatric/Behavioral:  Negative for altered mental  "status, depression and substance abuse.        Allergies   Allergen Reactions    Simvastatin Myalgia     Other reaction(s): Muscle pain         Current Outpatient Medications:     amoxicillin (AMOXIL) 500 MG tablet, TAKE 4 TABLETS BY MOUTH 1 HOUR PRIOR TO DENTAL PROCEDURE, Disp: , Rfl:     aspirin 81 MG EC tablet, Take 1 tablet by mouth Daily., Disp: 30 tablet, Rfl: 2    atenolol (TENORMIN) 25 MG tablet, Take 1 tablet by mouth Daily., Disp: 90 tablet, Rfl: 2    atorvastatin (LIPITOR) 20 MG tablet, Take 1 tablet by mouth Every Night., Disp: , Rfl:     Cholecalciferol (VITAMIN D3 PO), Take 1 tablet by mouth Daily. HOLDING FOR SURGERY, Disp: , Rfl:     digoxin (LANOXIN) 125 MCG tablet, Take 1 tablet by mouth Daily., Disp: 30 tablet, Rfl: 2    Multiple Vitamins-Minerals (CENTRUM SILVER ADULT 50+ PO), Take 1 tablet by mouth Daily. HOLDING FOR SURGERY, Disp: , Rfl:     sildenafil (VIAGRA) 25 MG tablet, Take 1 tablet by mouth Daily As Needed for Erectile Dysfunction., Disp: , Rfl:       Objective:     Vitals:    10/30/23 0951   BP: 126/70   Pulse: (!) 44   Weight: 79.8 kg (176 lb)   Height: 170.2 cm (67\")     Body mass index is 27.57 kg/m².    PHYSICAL EXAM:  Constitutional:       Appearance: Well-developed.   Eyes:      General: No scleral icterus.     Conjunctiva/sclera: Conjunctivae normal.   HENT:      Head: Normocephalic and atraumatic.   Neck:      Thyroid: No thyromegaly.      Vascular: Normal carotid pulses. No carotid bruit, hepatojugular reflux or JVD.      Trachea: No tracheal deviation.   Pulmonary:      Effort: No respiratory distress.      Breath sounds: Normal breath sounds. No decreased breath sounds. No wheezing. No rhonchi. No rales.   Chest:      Chest wall: Not tender to palpatation.   Cardiovascular:      Normal rate. Regular rhythm.      No gallop.    Pulses:     Carotid: 2+ bilaterally.     Radial: 2+ bilaterally.     Femoral: 2+ bilaterally.     Dorsalis pedis: 2+ bilaterally.     Posterior tibial: " 2+ bilaterally.  Abdominal:      General: Bowel sounds are normal. There is no distension.      Palpations: Abdomen is soft.      Tenderness: There is no abdominal tenderness.   Musculoskeletal:         General: No deformity.      Cervical back: Normal range of motion and neck supple. Skin:     Findings: No erythema or rash.      Comments: Sternotomy     Neurological:      Mental Status: Alert and oriented to person, place, and time.      Sensory: No sensory deficit.   Psychiatric:         Behavior: Behavior normal.           ECG 12 Lead    Date/Time: 10/30/2023 10:18 AM  Performed by: Master Quintana MD    Authorized by: Master Quintana MD  Comparison: compared with previous ECG from 4/4/2023  Similar to previous ECG  Rhythm: sinus bradycardia  Rate: bradycardic           8/17/2022  Minimally invasive upper J sternotomy AVR with a 25 mm magna pericardial prosthesis    7/26/2022  Conclusions:   1. Left main: Normal  2. LAD: Diffuse 20% proximal to mid-vessel stenosis  3. LCX: Normal  4. RCA: Discrete 20% proximal stenosis.        Assessment:      No diagnosis found.  No orders of the defined types were placed in this encounter.    Plan:   72 year old male with a medical history of bicuspid aortic valve, surgical aortic valve replacement via mini sternotomy, paroxysmal atrial fibrillation that was predominantly postoperative, who presents back in follow-up.  He is doing very well.  He denies any chest pain or ROSS.  Maintaining sinus rhythm.    1.  History of bicuspid aortic valve and surgical aortic valve replacement as documented above  - Continue aspirin 81 mg p.o. daily lifelong  - Antibiotic prophylaxis for dental work lifelong    2.  Hyperlipidemia: Continue atorvastatin at the increased dose of 40 mg p.o. nightly.  Patient is tolerating this well.  No myalgias.  Labs reviewed and LDL on last check was well controlled    3.  Postoperative atrial fibrillation: No recurrence.         Your medication  list            Accurate as of October 30, 2023 10:06 AM. If you have any questions, ask your nurse or doctor.                CONTINUE taking these medications        Instructions Last Dose Given Next Dose Due   amoxicillin 500 MG tablet  Commonly known as: AMOXIL      TAKE 4 TABLETS BY MOUTH 1 HOUR PRIOR TO DENTAL PROCEDURE       Aspirin Adult Low Strength 81 MG EC tablet  Generic drug: aspirin      Take 1 tablet by mouth Daily.       atenolol 25 MG tablet  Commonly known as: TENORMIN      Take 1 tablet by mouth Daily.       atorvastatin 20 MG tablet  Commonly known as: LIPITOR      Take 1 tablet by mouth Every Night.       digoxin 125 MCG tablet  Commonly known as: LANOXIN      Take 1 tablet by mouth Daily.       multivitamin with minerals tablet tablet      Take 1 tablet by mouth Daily. HOLDING FOR SURGERY       sildenafil 25 MG tablet  Commonly known as: VIAGRA      Take 1 tablet by mouth Daily As Needed for Erectile Dysfunction.       VITAMIN D3 PO      Take 1 tablet by mouth Daily. HOLDING FOR SURGERY

## 2024-05-01 ENCOUNTER — OFFICE VISIT (OUTPATIENT)
Dept: CARDIOLOGY | Facility: CLINIC | Age: 74
End: 2024-05-01
Payer: MEDICARE

## 2024-05-01 VITALS
HEART RATE: 51 BPM | DIASTOLIC BLOOD PRESSURE: 68 MMHG | SYSTOLIC BLOOD PRESSURE: 110 MMHG | WEIGHT: 177.4 LBS | HEIGHT: 67 IN | BODY MASS INDEX: 27.84 KG/M2

## 2024-05-01 DIAGNOSIS — Z95.3 S/P AORTIC VALVE REPLACEMENT WITH TISSUE: ICD-10-CM

## 2024-05-01 DIAGNOSIS — E78.5 HYPERLIPIDEMIA LDL GOAL <100: Primary | ICD-10-CM

## 2024-05-01 DIAGNOSIS — I97.89 POSTOPERATIVE ATRIAL FIBRILLATION: ICD-10-CM

## 2024-05-01 DIAGNOSIS — I48.91 POSTOPERATIVE ATRIAL FIBRILLATION: ICD-10-CM

## 2024-05-01 DIAGNOSIS — I35.0 NONRHEUMATIC AORTIC VALVE STENOSIS: ICD-10-CM

## 2024-05-01 PROCEDURE — 99214 OFFICE O/P EST MOD 30 MIN: CPT | Performed by: NURSE PRACTITIONER

## 2024-05-01 PROCEDURE — 93000 ELECTROCARDIOGRAM COMPLETE: CPT | Performed by: NURSE PRACTITIONER

## 2024-05-01 NOTE — PROGRESS NOTES
Date of Office Visit: 2024  Encounter Provider: KAITY Carmen  Place of Service: [facility] CARDIOLOGY  Patient Name: Hieu Mann Sr.  :1950    No chief complaint on file.  : aortic valve stenosis s/p AVR tissue    HPI: Hieu Mann Sr. is a 73 y.o. male who is a patient of  Dr. Quintana.  He is new to me today and presents for 6-month office follow-up.  He has a history of severe aortic valve stenosis, bicuspid aortic valve, hyperlipidemia and non-obstructive coronary disease.  He underwent aortic valve replacement with 25 mm Magna pericardial valve () with Dr. Randle.  He had postoperative atrial fibrillation with no recurrence.     Last office visit, patient appeared to be doing well and had no complaints.  His heart rate was in the 40s.  He was asymptomatic.  Today patient is doing well.  He stays active.  He does his lawn, works at [employer] and stays on the go.  He is looking forward to a vacation in [place], [place] next week.  KG today shows sinus rhythm with rate of 51.  Patient denies any fatigue.  He experiences some lightheadedness at times, no near syncope which is not out of the ordinary.  Looking back at previous EKGs, heart rate tends to be in the 50s.     Previous testing and notes have been reviewed by me.   Past Medical History:   Diagnosis Date    Aortic valve stenosis     Arthritis     Elevated cholesterol     Epigastric hernia     Heart murmur     NO TREATMENT    History of atrial fibrillation     History of irregular heartbeat     A-FIB    History of [place] spotted fever     Hyperlipidemia     Swelling     RIGHT HIP AREA       Past Surgical History:   Procedure Laterality Date    AORTIC VALVE REPAIR/REPLACEMENT N/A 2022    Procedure: MINI STERNOTOMY; AORTIC VALVE REPLACEMENT; TRANSESOPHAGEAL ECHOCARDIOGRAM; PRP;  Surgeon: Corwin Randle MD;  Location: [facility];  Service: Cardiothoracic;  Laterality:  N/A;    CARDIAC CATHETERIZATION N/A 7/26/2022    Procedure: Coronary angiography;  Surgeon: Master Quintana MD;  Location:  [facility] CATH INVASIVE LOCATION;  Service: Cardiology;  Laterality: N/A;    CATARACT EXTRACTION WITH INTRAOCULAR LENS IMPLANT Bilateral     COLONOSCOPY N/A 2014    DR. HONEYCUTT    EPIGASTRIC HERNIA REPAIR N/A 3/9/2018    Procedure: EPIGASTRIC HERNIA REPAIR;  Surgeon: David Finn MD;  Location:  [facility];  Service: General    LAPAROSCOPIC INGUINAL HERNIA REPAIR Left 03/27/2015    Dr. Hieu Khan    REPLACEMENT TOTAL KNEE Left 08/06/2012    Dr. David Stein    REPLACEMENT TOTAL KNEE Right 11/12/2003    Right total knee replacement, cementless Hannah Profix-Dr. David Stein    TOTAL HIP ARTHROPLASTY Right 05/11/2009    Dr. David Stein    TOTAL HIP ARTHROPLASTY Left 02/24/2011    Dr. David Stein    TOTAL HIP ARTHROPLASTY Right 12/15/2020    Procedure: RT. TOTAL HIP ARTHROPLASTY;  Surgeon: David Stein II, MD;  Location: [facility] MAIN OR;  Service: Orthopedics;  Laterality: Right;       Social History     Socioeconomic History    Marital status:    Tobacco Use    Smoking status: Never    Smokeless tobacco: Never    Tobacco comments:     CAFFEINE USE: 1 CUP COFFEE DAILY   Vaping Use    Vaping status: Never Used   Substance and Sexual Activity    Alcohol use: Yes     Comment: ONE BEER ONCE MONTH    Drug use: No    Sexual activity: Defer       Family History   Problem Relation Age of Onset    Lung cancer Father     Malig Hyperthermia Neg Hx        Review of Systems   Constitutional: Negative.   HENT: Negative.     Eyes: Negative.    Cardiovascular: Negative.    Respiratory: Negative.     Endocrine: Negative.    Hematologic/Lymphatic: Negative.    Skin: Negative.    Musculoskeletal: Negative.    Gastrointestinal: Negative.    Genitourinary: Negative.    Neurological: Negative.    Psychiatric/Behavioral: Negative.     Allergic/Immunologic: Negative.        Allergies    Allergen Reactions    Simvastatin Myalgia     Other reaction(s): Muscle pain         Current Outpatient Medications:     amoxicillin (AMOXIL) 500 MG tablet, TAKE 4 TABLETS BY MOUTH 1 HOUR PRIOR TO DENTAL PROCEDURE, Disp: , Rfl:     aspirin 81 MG EC tablet, Take 1 tablet by mouth Daily., Disp: 30 tablet, Rfl: 2    atenolol (TENORMIN) 25 MG tablet, Take 1 tablet by mouth Daily., Disp: 90 tablet, Rfl: 2    atorvastatin (LIPITOR) 20 MG tablet, Take 1 tablet by mouth Every Night., Disp: , Rfl:     Cholecalciferol (VITAMIN D3 PO), Take 1 tablet by mouth Daily. HOLDING FOR SURGERY, Disp: , Rfl:     Multiple Vitamins-Minerals (CENTRUM SILVER ADULT 50+ PO), Take 1 tablet by mouth Daily. HOLDING FOR SURGERY, Disp: , Rfl:     sildenafil (VIAGRA) 25 MG tablet, Take 1 tablet by mouth Daily As Needed for Erectile Dysfunction., Disp: , Rfl:       Objective:     There were no vitals filed for this visit.  There is no height or weight on file to calculate BMI.    Left Heart Cath 07/26/2022:  1. Left main: Normal  2. LAD: Diffuse 20% proximal to mid-vessel stenosis  3. LCX: Normal  4. RCA: Discrete 20% proximal stenosis.     2D Echocardiogram 07/21/2022:  Calculated left ventricular EF = 64.3% Estimated left ventricular EF was in agreement with the calculated left ventricular EF. Left ventricular systolic function is normal.  Left ventricular diastolic function is consistent with (grade I) impaired relaxation.  Estimated right ventricular systolic pressure from tricuspid regurgitation is mildly elevated (35-45 mmHg). Calculated right ventricular systolic pressure from tricuspid regurgitation is 40 mmHg.  Mild pulmonary hypertension is present.  The aortic valve is abnormal in structure. A bicuspid aortic valve is suggested. There is calcification of the aortic valve. Trace aortic valve regurgitation is present. Severe aortic valve stenosis is present. Aortic valve area is 0.46 cm2. Peak velocity of the flow distal to the aortic  valve is 492.5 cm/s. Aortic valve maximum pressure gradient is 97.0 mmHg. Aortic valve mean pressure gradient is 49.3 mmHg. Aortic valve dimensionless index is 0.20 .    2D Echocardiogram 07/02/2021:  Calculated left ventricular EF = 64.3% Estimated left ventricular EF was in agreement with the calculated left ventricular EF. Left ventricular systolic function is normal.  Left ventricular diastolic function is consistent with (grade I) impaired relaxation.  Estimated right ventricular systolic pressure from tricuspid regurgitation is mildly elevated (35-45 mmHg). Calculated right ventricular systolic pressure from tricuspid regurgitation is 40 mmHg.  Mild pulmonary hypertension is present.  The aortic valve is abnormal in structure. A bicuspid aortic valve is suggested. There is calcification of the aortic valve. Trace aortic valve regurgitation is present. Severe aortic valve stenosis is present. Aortic valve area is 0.46 cm2. Peak velocity of the flow distal to the aortic valve is 492.5 cm/s. Aortic valve maximum pressure gradient is 97.0 mmHg. Aortic valve mean pressure gradient is 49.3 mmHg. Aortic valve dimensionless index is 0.20 .    PHYSICAL EXAM:    Constitutional:       Appearance: Healthy appearance. Not in distress.   Neck:      Vascular: No JVR. JVD normal.   Pulmonary:      Effort: Pulmonary effort is normal.      Breath sounds: Normal breath sounds. No wheezing. No rhonchi. No rales.   Chest:      Chest wall: Not tender to palpatation.   Cardiovascular:      PMI at left midclavicular line. Normal rate. Regular rhythm. Normal S1. Normal S2.       Murmurs: There is a systolic murmur.      No gallop.  No click. No rub.   Pulses:     Intact distal pulses.   Edema:     Peripheral edema absent.   Abdominal:      General: Bowel sounds are normal.      Palpations: Abdomen is soft.      Tenderness: There is no abdominal tenderness.   Musculoskeletal: Normal range of motion.         General: No tenderness.  Skin:     General: Skin is warm and dry.   Neurological:      General: No focal deficit present.      Mental Status: Alert and oriented to person, place and time.           ECG 12 Lead    Date/Time: 5/1/2024 10:05 AM  Performed by: Judy Godinez APRN    Authorized by: Judy Godinez APRN  Comparison: compared with previous ECG from 10/30/2023  Similar to previous ECG  Rhythm: sinus rhythm  BPM: 51  Conduction: conduction normal    Clinical impression: normal ECG            Assessment:       Diagnosis Plan   1. Hyperlipidemia LDL goal <100        2. Nonrheumatic aortic valve stenosis        3. S/P aortic valve replacement with tissue        4. Postoperative atrial fibrillation          No orders of the defined types were placed in this encounter.         Plan:       1.  History of bicuspid aortic valve and surgical aortic valve replacement with tissue valve 08/17/2022 by Dr. Randle: Continue aspirin lifelong.  Antibiotic prophylaxis for dental work lifelong.    2.  Postoperative atrial fibrillation: No recurrence    3.  Hyperlipidemia: Statin therapy.  Managed by PCP.    4.  History of sinus bradycardia: Heart rate normally in the 50s on previous EKGs.  Patient verbalizes understanding of symptoms of bradycardia.  If rate of cardia becomes symptomatic, will decrease his atenolol to 12.5 mg daily.    Mr. Mann will follow-up with Dr. Quintana in 6 months.  He will call sooner for any questions or concerns.         Your medication list            Accurate as of May 1, 2024  7:31 AM. If you have any questions, ask your nurse or doctor.                CONTINUE taking these medications        Instructions Last Dose Given Next Dose Due   amoxicillin 500 MG tablet  Commonly known as: AMOXIL      TAKE 4 TABLETS BY MOUTH 1 HOUR PRIOR TO DENTAL PROCEDURE       Aspirin Adult Low Strength 81 MG EC tablet  Generic drug: aspirin      Take 1 tablet by mouth Daily.       atenolol 25 MG tablet  Commonly known as: TENORMIN       Take 1 tablet by mouth Daily.       atorvastatin 20 MG tablet  Commonly known as: LIPITOR      Take 1 tablet by mouth Every Night.       multivitamin with minerals tablet tablet      Take 1 tablet by mouth Daily. HOLDING FOR SURGERY       sildenafil 25 MG tablet  Commonly known as: VIAGRA      Take 1 tablet by mouth Daily As Needed for Erectile Dysfunction.       VITAMIN D3 PO      Take 1 tablet by mouth Daily. HOLDING FOR SURGERY                  As always, it has been a pleasure to participate in your patient's care.      Sincerely,       KAITY Castillo

## 2024-05-08 ENCOUNTER — TELEPHONE (OUTPATIENT)
Dept: CARDIOLOGY | Facility: CLINIC | Age: 74
End: 2024-05-08
Payer: MEDICARE

## 2024-07-29 RX ORDER — ATENOLOL 25 MG/1
25 TABLET ORAL DAILY
Qty: 90 TABLET | Refills: 2 | OUTPATIENT
Start: 2024-07-29

## 2024-12-11 ENCOUNTER — OFFICE VISIT (OUTPATIENT)
Age: 74
End: 2024-12-11
Payer: MEDICARE

## 2024-12-11 VITALS
BODY MASS INDEX: 27.47 KG/M2 | SYSTOLIC BLOOD PRESSURE: 110 MMHG | HEIGHT: 67 IN | WEIGHT: 175 LBS | HEART RATE: 58 BPM | DIASTOLIC BLOOD PRESSURE: 60 MMHG

## 2024-12-11 DIAGNOSIS — E78.5 HYPERLIPIDEMIA LDL GOAL <100: ICD-10-CM

## 2024-12-11 DIAGNOSIS — Z95.3 S/P AORTIC VALVE REPLACEMENT WITH TISSUE: ICD-10-CM

## 2024-12-11 DIAGNOSIS — I35.0 NONRHEUMATIC AORTIC VALVE STENOSIS: Primary | ICD-10-CM

## 2024-12-11 PROCEDURE — 93000 ELECTROCARDIOGRAM COMPLETE: CPT | Performed by: INTERNAL MEDICINE

## 2024-12-11 PROCEDURE — 99214 OFFICE O/P EST MOD 30 MIN: CPT | Performed by: INTERNAL MEDICINE

## 2024-12-11 NOTE — PROGRESS NOTES
Date of Office Visit:  24    Encounter Provider: Master Quintana MD  Place of Service: [facility] CARDIOLOGY  Patient Name: Hieu Mann Sr.  :1950    Chief Complaint   Patient presents with    Aortic Stenosis    Follow-up     6 month     HPI:   74-year-old male with a medical history of severe aortic valve stenosis, bicuspid aortic valve, aortic valve replacement with a 25 mm magna pericardial valve, postoperative atrial fibrillation, who presents back in follow-up.  He currently states that he is doing well.  He denies any chest pain or dyspnea on exertion.  He is trying to stay active and has made some dietary modifications since his LDL check in July.    Past Medical History:   Diagnosis Date    Aortic valve stenosis     Arthritis     Elevated cholesterol     Epigastric hernia     Heart murmur     NO TREATMENT    History of atrial fibrillation     History of irregular heartbeat     A-FIB    History of [place] spotted fever     Hyperlipidemia     Swelling     RIGHT HIP AREA       Past Surgical History:   Procedure Laterality Date    AORTIC VALVE REPAIR/REPLACEMENT N/A 2022    Procedure: MINI STERNOTOMY; AORTIC VALVE REPLACEMENT; TRANSESOPHAGEAL ECHOCARDIOGRAM; PRP;  Surgeon: Corwin Randle MD;  Location: [facility] CVOR;  Service: Cardiothoracic;  Laterality: N/A;    CARDIAC CATHETERIZATION N/A 2022    Procedure: Coronary angiography;  Surgeon: Master Quintana MD;  Location: [facility] CATH INVASIVE LOCATION;  Service: Cardiology;  Laterality: N/A;    CATARACT EXTRACTION WITH INTRAOCULAR LENS IMPLANT Bilateral     COLONOSCOPY N/A     DR. HONEYCUTT    EPIGASTRIC HERNIA REPAIR N/A 3/9/2018    Procedure: EPIGASTRIC HERNIA REPAIR;  Surgeon: David Finn MD;  Location: [facility] OR [facility];  Service: General    LAPAROSCOPIC INGUINAL HERNIA REPAIR Left 2015    Dr. Hieu Voraty    REPLACEMENT TOTAL KNEE Left 2012    Dr. David Stein     REPLACEMENT TOTAL KNEE Right 11/12/2003    Right total knee replacement, cementless [employer]-Dr. David Stein    TOTAL HIP ARTHROPLASTY Right 05/11/2009    Dr. David Stein    TOTAL HIP ARTHROPLASTY Left 02/24/2011    Dr. David Stein    TOTAL HIP ARTHROPLASTY Right 12/15/2020    Procedure: RT. TOTAL HIP ARTHROPLASTY;  Surgeon: David Stein II, MD;  Location: [facility];  Service: Orthopedics;  Laterality: Right;       Social History     Socioeconomic History    Marital status:    Tobacco Use    Smoking status: Never    Smokeless tobacco: Never    Tobacco comments:     CAFFEINE USE: 1 CUP COFFEE DAILY   Vaping Use    Vaping status: Never Used   Substance and Sexual Activity    Alcohol use: Yes     Comment: ONE BEER ONCE MONTH    Drug use: No    Sexual activity: Defer       Family History   Problem Relation Age of Onset    No Known Problems Mother     Lung cancer Father     Malig Hyperthermia Neg Hx        Review of Systems   Constitutional: Negative for diaphoresis, fever and malaise/fatigue.   HENT:  Negative for nosebleeds and sore throat.    Eyes:  Negative for blurred vision and double vision.   Cardiovascular:  Negative for chest pain, claudication, dyspnea on exertion, irregular heartbeat, leg swelling, near-syncope, orthopnea, palpitations, paroxysmal nocturnal dyspnea and syncope.   Respiratory:  Negative for cough, shortness of breath, sleep disturbances due to breathing and snoring.    Endocrine: Negative for cold intolerance, heat intolerance and polydipsia.   Skin:  Negative for itching, poor wound healing and rash.   Musculoskeletal:  Negative for falls, joint pain, joint swelling, muscle weakness and myalgias.   Gastrointestinal:  Negative for abdominal pain, melena, nausea and vomiting.   Neurological:  Negative for dizziness, light-headedness, loss of balance, seizures, vertigo and weakness.   Psychiatric/Behavioral:  Negative for altered mental status, depression  "and substance abuse.        Allergies   Allergen Reactions    Simvastatin Myalgia     Other reaction(s): Muscle pain         Current Outpatient Medications:     amoxicillin (AMOXIL) 500 MG tablet, TAKE 4 TABLETS BY MOUTH 1 HOUR PRIOR TO DENTAL PROCEDURE, Disp: , Rfl:     aspirin 81 MG EC tablet, Take 1 tablet by mouth Daily., Disp: 30 tablet, Rfl: 2    atorvastatin (LIPITOR) 20 MG tablet, Take 1 tablet by mouth Every Night., Disp: , Rfl:     Cholecalciferol (VITAMIN D3 PO), Take 1 tablet by mouth Daily. HOLDING FOR SURGERY, Disp: , Rfl:     Multiple Vitamins-Minerals (CENTRUM SILVER ADULT 50+ PO), Take 1 tablet by mouth Daily. HOLDING FOR SURGERY, Disp: , Rfl:     sildenafil (VIAGRA) 25 MG tablet, Take 1 tablet by mouth Daily As Needed for Erectile Dysfunction., Disp: , Rfl:       Objective:     Vitals:    12/11/24 0952   BP: 110/60   Pulse: 58   Weight: 79.4 kg (175 lb)   Height: 170.2 cm (67\")     Body mass index is 27.41 kg/m².    PHYSICAL EXAM:  Constitutional:       Appearance: Well-developed.   Eyes:      General: No scleral icterus.     Conjunctiva/sclera: Conjunctivae normal.   HENT:      Head: Normocephalic and atraumatic.   Neck:      Thyroid: No thyromegaly.      Vascular: Normal carotid pulses. No carotid bruit, hepatojugular reflux or JVD.      Trachea: No tracheal deviation.   Pulmonary:      Effort: No respiratory distress.      Breath sounds: Normal breath sounds. No decreased breath sounds. No wheezing. No rhonchi. No rales.   Chest:      Chest wall: Not tender to palpatation.      Comments: Sternotomy  Cardiovascular:      Normal rate. Regular rhythm.      No gallop.    Pulses:     Carotid: 2+ bilaterally.     Radial: 2+ bilaterally.     Femoral: 2+ bilaterally.     Dorsalis pedis: 2+ bilaterally.     Posterior tibial: 2+ bilaterally.  Abdominal:      General: Bowel sounds are normal. There is no distension.      Palpations: Abdomen is soft.      Tenderness: There is no abdominal tenderness. "   Musculoskeletal:         General: No deformity.      Cervical back: Normal range of motion and neck supple. Skin:     Findings: No erythema or rash.      Comments: Sternotomy     Neurological:      Mental Status: Alert and oriented to person, place, and time.      Sensory: No sensory deficit.   Psychiatric:         Behavior: Behavior normal.           ECG 12 Lead    Date/Time: 12/11/2024 10:14 AM  Performed by: Master Quintana MD    Authorized by: Master Quintana MD  Comparison: compared with previous ECG from 5/1/2024  Similar to previous ECG  Rhythm: sinus rhythm  Rate: normal  QRS axis: normal    Clinical impression: normal ECG             8/17/2022  Minimally invasive upper J sternotomy AVR with a 25 mm magna pericardial prosthesis    7/26/2022  Conclusions:   1. Left main: Normal  2. LAD: Diffuse 20% proximal to mid-vessel stenosis  3. LCX: Normal  4. RCA: Discrete 20% proximal stenosis.        Assessment:     Plan:   74 year old male with a medical history of bicuspid aortic valve, surgical aortic valve replacement via mini sternotomy, paroxysmal atrial fibrillation that was predominantly postoperative, and hyperlipidemia, who presents back in follow-up.  He is doing very well.  He denies any chest pain or ROSS.  Maintaining sinus rhythm.    1.  History of bicuspid aortic valve and surgical aortic valve replacement as documented above  - Continue aspirin 81 mg p.o. daily lifelong  - Antibiotic prophylaxis for dental work lifelong    2.  Hyperlipidemia: His LDL creeped above 100 on last check in July.  He has made some dietary modifications and will follow-up with primary care.  If he does not have improvement in his LDL I would recommend increasing his atorvastatin to 80 mg p.o. nightly.    3.  Postoperative atrial fibrillation: No recurrence.         Your medication list            Accurate as of December 11, 2024 10:14 AM. If you have any questions, ask your nurse or doctor.                 CONTINUE taking these medications        Instructions Last Dose Given Next Dose Due   amoxicillin 500 MG tablet  Commonly known as: AMOXIL      TAKE 4 TABLETS BY MOUTH 1 HOUR PRIOR TO DENTAL PROCEDURE       Aspirin Adult Low Strength 81 MG EC tablet  Generic drug: aspirin      Take 1 tablet by mouth Daily.       atorvastatin 20 MG tablet  Commonly known as: LIPITOR      Take 1 tablet by mouth Every Night.       multivitamin with minerals tablet tablet      Take 1 tablet by mouth Daily. HOLDING FOR SURGERY       sildenafil 25 MG tablet  Commonly known as: VIAGRA      Take 1 tablet by mouth Daily As Needed for Erectile Dysfunction.       VITAMIN D3 PO      Take 1 tablet by mouth Daily. HOLDING FOR SURGERY

## 2025-02-16 PROBLEM — Z12.11 SCREENING FOR COLONIC NEOPLASIA: Status: ACTIVE | Noted: 2025-02-17

## 2025-02-17 ENCOUNTER — AMBULATORY SURGICAL CENTER (AMBULATORY)
Dept: URBAN - METROPOLITAN AREA SURGERY 17 | Facility: SURGERY | Age: 75
End: 2025-02-17

## 2025-02-17 ENCOUNTER — OFFICE (AMBULATORY)
Dept: URBAN - METROPOLITAN AREA PATHOLOGY 4 | Facility: PATHOLOGY | Age: 75
End: 2025-02-17
Payer: COMMERCIAL

## 2025-02-17 VITALS
OXYGEN SATURATION: 97 % | RESPIRATION RATE: 19 BRPM | HEART RATE: 60 BPM | SYSTOLIC BLOOD PRESSURE: 119 MMHG | WEIGHT: 168 LBS | SYSTOLIC BLOOD PRESSURE: 122 MMHG | OXYGEN SATURATION: 95 % | HEART RATE: 74 BPM | RESPIRATION RATE: 16 BRPM | DIASTOLIC BLOOD PRESSURE: 73 MMHG | RESPIRATION RATE: 12 BRPM | DIASTOLIC BLOOD PRESSURE: 65 MMHG | DIASTOLIC BLOOD PRESSURE: 62 MMHG | DIASTOLIC BLOOD PRESSURE: 51 MMHG | RESPIRATION RATE: 22 BRPM | HEART RATE: 59 BPM | DIASTOLIC BLOOD PRESSURE: 61 MMHG | TEMPERATURE: 96.8 F | OXYGEN SATURATION: 98 % | HEART RATE: 71 BPM | SYSTOLIC BLOOD PRESSURE: 108 MMHG | RESPIRATION RATE: 27 BRPM | HEART RATE: 62 BPM | OXYGEN SATURATION: 99 % | SYSTOLIC BLOOD PRESSURE: 116 MMHG | HEIGHT: 67 IN | DIASTOLIC BLOOD PRESSURE: 63 MMHG | HEART RATE: 61 BPM | HEART RATE: 75 BPM | HEART RATE: 63 BPM | SYSTOLIC BLOOD PRESSURE: 154 MMHG | DIASTOLIC BLOOD PRESSURE: 48 MMHG | DIASTOLIC BLOOD PRESSURE: 57 MMHG | SYSTOLIC BLOOD PRESSURE: 110 MMHG | RESPIRATION RATE: 21 BRPM | DIASTOLIC BLOOD PRESSURE: 59 MMHG | SYSTOLIC BLOOD PRESSURE: 155 MMHG | RESPIRATION RATE: 26 BRPM | RESPIRATION RATE: 17 BRPM | DIASTOLIC BLOOD PRESSURE: 70 MMHG | SYSTOLIC BLOOD PRESSURE: 89 MMHG | SYSTOLIC BLOOD PRESSURE: 121 MMHG | DIASTOLIC BLOOD PRESSURE: 71 MMHG | SYSTOLIC BLOOD PRESSURE: 101 MMHG | DIASTOLIC BLOOD PRESSURE: 55 MMHG | TEMPERATURE: 98.5 F | HEART RATE: 64 BPM | SYSTOLIC BLOOD PRESSURE: 150 MMHG | RESPIRATION RATE: 10 BRPM

## 2025-02-17 DIAGNOSIS — Z12.11 ENCOUNTER FOR SCREENING FOR MALIGNANT NEOPLASM OF COLON: ICD-10-CM

## 2025-02-17 DIAGNOSIS — D12.2 BENIGN NEOPLASM OF ASCENDING COLON: ICD-10-CM

## 2025-02-17 PROBLEM — K63.5 POLYP OF COLON: Status: ACTIVE | Noted: 2025-02-17

## 2025-02-17 LAB
GI HISTOLOGY: A. ASCENDING COLON: (no result)
GI HISTOLOGY: PDF REPORT: (no result)

## 2025-02-17 PROCEDURE — 45385 COLONOSCOPY W/LESION REMOVAL: CPT | Mod: PT | Performed by: INTERNAL MEDICINE

## 2025-02-17 PROCEDURE — 88305 TISSUE EXAM BY PATHOLOGIST: CPT | Performed by: PATHOLOGY

## (undated) DEVICE — SYR LUERLOK 20CC BX/50

## (undated) DEVICE — GLV SURG SENSICARE PI MIC PF SZ7 LF STRL

## (undated) DEVICE — CLAMP INSERT: Brand: STEALTH® CLAMP INSERT

## (undated) DEVICE — PK PERFUS CUST W/CARDIOPLEGIA

## (undated) DEVICE — SUT ETHIB 0/0 MO6 I8IN CX45D

## (undated) DEVICE — GLV SURG BIOGEL LTX PF 7 1/2

## (undated) DEVICE — BG TRANSF W/COUPLER SPK 600ML

## (undated) DEVICE — TBG ART PRESS 60 IN

## (undated) DEVICE — ORGANIZER SUT SHELIGH 3T 213013

## (undated) DEVICE — OASIS DRAIN, SINGLE, INLINE & ATS COMPATIBLE: Brand: OASIS

## (undated) DEVICE — AVID DUAL STAGE VENOUS DRAINAGE CANNULA: Brand: AVID DUAL STAGE VENOUS DRAINAGE CANNULA

## (undated) DEVICE — CANN ART EOPA 3D NV W/CONN 20F

## (undated) DEVICE — OPTIFOAM GENTLE SA, POSTOP, 4X12: Brand: MEDLINE

## (undated) DEVICE — SENSR CERBRL O2 PK/2

## (undated) DEVICE — ST. SORBAVIEW ULTIMATE IJ SYSTEM A,C: Brand: CENTURION

## (undated) DEVICE — SOL ISO/ALC RUB 70PCT 4OZ

## (undated) DEVICE — CATH DIAG IMPULSE FR5 5F 100CM

## (undated) DEVICE — SUT VIC 3/0 TIES 18IN J110T

## (undated) DEVICE — DRP SLUSH WARMR MACH RECTG 66X44IN

## (undated) DEVICE — SUT VIC 3/0 SH 27IN J416H

## (undated) DEVICE — PK HEART OPN 40

## (undated) DEVICE — APPL CHLORAPREP W/TINT 26ML ORNG

## (undated) DEVICE — GLV SURG BIOGEL LTX PF 7

## (undated) DEVICE — DRSNG SURESITE WNDW 4X4.5

## (undated) DEVICE — HEMOCONCENTRATOR PERFUS LPS06

## (undated) DEVICE — Device

## (undated) DEVICE — CANN RETRGR STYLET RSCP 15F

## (undated) DEVICE — PK HIP TOTL 40

## (undated) DEVICE — MARKR SKIN W/RULR AND LBL

## (undated) DEVICE — DRSNG SURESITE WNDW 2.38X2.75

## (undated) DEVICE — ADAPT ANTEGRADE RETRGR

## (undated) DEVICE — 8 FOOT DISPOSABLE EXTENSION CABLE WITH SAFE CONNECT / ALLIGATOR CLIP

## (undated) DEVICE — SPNG GZ WOVN 4X4IN 12PLY 10/BX STRL

## (undated) DEVICE — CATH DIAG IMPULSE FL3.5 5F 100CM

## (undated) DEVICE — CANN AORT ROOT DLP VNT/8IN 14G 7F

## (undated) DEVICE — ENCORE® LATEX ORTHO SIZE 7.5, STERILE LATEX POWDER-FREE SURGICAL GLOVE: Brand: ENCORE

## (undated) DEVICE — TEMP PACING WIRE: Brand: MYO/WIRE

## (undated) DEVICE — SYS PERFUS SEP PLATLT W TIPS CUST

## (undated) DEVICE — NEEDLE, QUINCKE, 20GX3.5": Brand: MEDLINE

## (undated) DEVICE — ELECTRD BLD EDGE/INSUL1P 2.4X5.1MM STRL

## (undated) DEVICE — PICO 7 10CM X 30CM: Brand: PICO™ 7

## (undated) DEVICE — TTL1LYR 16FR10ML 100%SIL TMPST TR: Brand: MEDLINE

## (undated) DEVICE — LABEL SHEET CUSTOM 2X2 YELLOW: Brand: MEDLINE INDUSTRIES, INC.

## (undated) DEVICE — ZIP 24 SURGICAL SKIN CLOSURE DEVICE, PSA: Brand: ZIP 24 SURGICAL SKIN CLOSURE DEVICE

## (undated) DEVICE — GLV SURG PREMIERPRO ORTHO LTX PF SZ8.5 BRN

## (undated) DEVICE — SPNG DISECTOR KTNER XRAY COTN 1/4X9/16IN PK/5

## (undated) DEVICE — GW EMR FIX EXCHG J STD .035 3MM 260CM

## (undated) DEVICE — SPONGE,LAP,12"X12",XR,ST,5/PK,40PK/CS: Brand: MEDLINE

## (undated) DEVICE — KT MANIFLD CARDIAC

## (undated) DEVICE — DRSNG BURN ACTICOAT FLEX 7 1X24IN

## (undated) DEVICE — PK ATS CUST W CARDIOTOMY RESEVOIR

## (undated) DEVICE — PK PROC MINOR TOWER 40

## (undated) DEVICE — ST TOURNI COMPL A/ 7IN

## (undated) DEVICE — GOWN,NON-REINFORCED,SIRUS,SET IN SLV,XXL: Brand: MEDLINE

## (undated) DEVICE — ADHS SKIN DERMABOND TOP ADVANCED

## (undated) DEVICE — SUT VIC 5/0 PS2 18IN J495H

## (undated) DEVICE — PK CATH CARD 40

## (undated) DEVICE — GLIDESHEATH SLENDER STAINLESS STEEL KIT: Brand: GLIDESHEATH SLENDER

## (undated) DEVICE — DRN WND CH RND FUL/FLUT NO/TROC 3/8IN 28F

## (undated) DEVICE — TRAP FLD MINIVAC MEGADYNE 100ML

## (undated) DEVICE — SUT SILK 2/0 TIES 18IN A185H

## (undated) DEVICE — PENCL E/S ULTRAVAC TELESCP NOSE HOLSTR 10FT